# Patient Record
Sex: FEMALE | Race: WHITE | Employment: OTHER | URBAN - METROPOLITAN AREA
[De-identification: names, ages, dates, MRNs, and addresses within clinical notes are randomized per-mention and may not be internally consistent; named-entity substitution may affect disease eponyms.]

---

## 2017-01-16 ENCOUNTER — TELEPHONE (OUTPATIENT)
Dept: FAMILY MEDICINE CLINIC | Age: 67
End: 2017-01-16

## 2017-01-23 ENCOUNTER — OFFICE VISIT (OUTPATIENT)
Dept: FAMILY MEDICINE CLINIC | Age: 67
End: 2017-01-23

## 2017-01-23 VITALS
WEIGHT: 150.8 LBS | HEART RATE: 80 BPM | DIASTOLIC BLOOD PRESSURE: 80 MMHG | BODY MASS INDEX: 26.29 KG/M2 | TEMPERATURE: 98 F | SYSTOLIC BLOOD PRESSURE: 144 MMHG | RESPIRATION RATE: 14 BRPM

## 2017-01-23 DIAGNOSIS — M25.512 LEFT SHOULDER PAIN, UNSPECIFIED CHRONICITY: Primary | ICD-10-CM

## 2017-01-23 DIAGNOSIS — G56.02 LEFT CARPAL TUNNEL SYNDROME: ICD-10-CM

## 2017-01-23 DIAGNOSIS — Z72.0 TOBACCO ABUSE: ICD-10-CM

## 2017-01-23 DIAGNOSIS — R03.0 ELEVATED BLOOD PRESSURE READING WITHOUT DIAGNOSIS OF HYPERTENSION: ICD-10-CM

## 2017-01-23 DIAGNOSIS — M50.20 PROTRUDED CERVICAL DISC: ICD-10-CM

## 2017-01-23 PROCEDURE — 99213 OFFICE O/P EST LOW 20 MIN: CPT | Performed by: NURSE PRACTITIONER

## 2017-01-23 RX ORDER — CYCLOBENZAPRINE HCL 5 MG
5 TABLET ORAL NIGHTLY PRN
Qty: 10 TABLET | Refills: 0 | Status: SHIPPED | OUTPATIENT
Start: 2017-01-23 | End: 2017-02-02

## 2017-01-23 ASSESSMENT — ENCOUNTER SYMPTOMS
DIARRHEA: 0
ABDOMINAL DISTENTION: 0
CONSTIPATION: 0
NAUSEA: 0
VOMITING: 0
APNEA: 0
TROUBLE SWALLOWING: 0
PHOTOPHOBIA: 0
BLOOD IN STOOL: 0
ABDOMINAL PAIN: 0
VOICE CHANGE: 0
SHORTNESS OF BREATH: 0
BACK PAIN: 0
ANAL BLEEDING: 0
COUGH: 0
WHEEZING: 0

## 2017-05-02 LAB
ALBUMIN SERPL-MCNC: NORMAL G/DL
ALP BLD-CCNC: NORMAL U/L
ALT SERPL-CCNC: NORMAL U/L
AST SERPL-CCNC: NORMAL U/L
BILIRUB SERPL-MCNC: NORMAL MG/DL (ref 0.1–1.4)
BUN BLDV-MCNC: NORMAL MG/DL
CALCIUM SERPL-MCNC: NORMAL MG/DL
CHLORIDE BLD-SCNC: NORMAL MMOL/L
CO2: NORMAL MMOL/L
CREAT SERPL-MCNC: NORMAL MG/DL
GFR CALCULATED: NORMAL
GLUCOSE BLD-MCNC: NORMAL MG/DL
POTASSIUM SERPL-SCNC: NORMAL MMOL/L
SODIUM BLD-SCNC: NORMAL MMOL/L
TOTAL PROTEIN: NORMAL

## 2017-05-04 ENCOUNTER — OFFICE VISIT (OUTPATIENT)
Dept: FAMILY MEDICINE CLINIC | Age: 67
End: 2017-05-04

## 2017-05-04 VITALS
TEMPERATURE: 98.6 F | SYSTOLIC BLOOD PRESSURE: 138 MMHG | BODY MASS INDEX: 25.68 KG/M2 | RESPIRATION RATE: 18 BRPM | HEART RATE: 92 BPM | DIASTOLIC BLOOD PRESSURE: 84 MMHG | WEIGHT: 150.4 LBS | HEIGHT: 64 IN

## 2017-05-04 DIAGNOSIS — E78.5 HYPERLIPIDEMIA, UNSPECIFIED HYPERLIPIDEMIA TYPE: ICD-10-CM

## 2017-05-04 DIAGNOSIS — Z72.0 TOBACCO ABUSE: ICD-10-CM

## 2017-05-04 DIAGNOSIS — G25.81 RESTLESS LEG SYNDROME: ICD-10-CM

## 2017-05-04 DIAGNOSIS — M75.122 COMPLETE ROTATOR CUFF TEAR OF LEFT SHOULDER: Primary | ICD-10-CM

## 2017-05-04 PROCEDURE — 99214 OFFICE O/P EST MOD 30 MIN: CPT | Performed by: FAMILY MEDICINE

## 2017-05-04 ASSESSMENT — ENCOUNTER SYMPTOMS
CONSTIPATION: 0
VOMITING: 0
BLOOD IN STOOL: 0
DIARRHEA: 0
SHORTNESS OF BREATH: 0
NAUSEA: 0
BLURRED VISION: 0
ORTHOPNEA: 0

## 2017-07-13 ENCOUNTER — OFFICE VISIT (OUTPATIENT)
Dept: FAMILY MEDICINE CLINIC | Age: 67
End: 2017-07-13

## 2017-07-13 VITALS
BODY MASS INDEX: 26.33 KG/M2 | RESPIRATION RATE: 16 BRPM | DIASTOLIC BLOOD PRESSURE: 74 MMHG | HEART RATE: 88 BPM | SYSTOLIC BLOOD PRESSURE: 120 MMHG | TEMPERATURE: 98.6 F | WEIGHT: 151 LBS

## 2017-07-13 DIAGNOSIS — I83.93 VARICOSE VEINS OF BOTH LOWER EXTREMITIES: ICD-10-CM

## 2017-07-13 DIAGNOSIS — F41.9 ANXIETY: ICD-10-CM

## 2017-07-13 DIAGNOSIS — R91.1 LUNG NODULE: ICD-10-CM

## 2017-07-13 DIAGNOSIS — R00.2 HEART PALPITATIONS: ICD-10-CM

## 2017-07-13 DIAGNOSIS — Z12.39 BREAST CANCER SCREENING: ICD-10-CM

## 2017-07-13 DIAGNOSIS — G25.81 RESTLESS LEG SYNDROME: ICD-10-CM

## 2017-07-13 DIAGNOSIS — Z78.0 POSTMENOPAUSAL: ICD-10-CM

## 2017-07-13 DIAGNOSIS — Z72.0 TOBACCO ABUSE: ICD-10-CM

## 2017-07-13 DIAGNOSIS — M85.80 OSTEOPENIA: ICD-10-CM

## 2017-07-13 DIAGNOSIS — R20.2 PARESTHESIA OF BILATERAL LEGS: ICD-10-CM

## 2017-07-13 DIAGNOSIS — E78.2 MIXED HYPERLIPIDEMIA: Primary | ICD-10-CM

## 2017-07-13 DIAGNOSIS — M19.91 PRIMARY OSTEOARTHRITIS, UNSPECIFIED SITE: ICD-10-CM

## 2017-07-13 PROCEDURE — 99214 OFFICE O/P EST MOD 30 MIN: CPT | Performed by: NURSE PRACTITIONER

## 2017-07-13 RX ORDER — EZETIMIBE 10 MG/1
10 TABLET ORAL DAILY
Qty: 30 TABLET | Refills: 0 | Status: SHIPPED | OUTPATIENT
Start: 2017-07-13 | End: 2017-07-14 | Stop reason: SDUPTHER

## 2017-07-13 RX ORDER — BUSPIRONE HYDROCHLORIDE 7.5 MG/1
7.5 TABLET ORAL 2 TIMES DAILY
Qty: 60 TABLET | Refills: 0 | Status: SHIPPED | OUTPATIENT
Start: 2017-07-13 | End: 2017-08-12

## 2017-07-13 ASSESSMENT — ENCOUNTER SYMPTOMS
TROUBLE SWALLOWING: 0
DIARRHEA: 0
SHORTNESS OF BREATH: 0
NAUSEA: 0
ABDOMINAL DISTENTION: 0
ABDOMINAL PAIN: 0
BLOOD IN STOOL: 0
VOICE CHANGE: 0
VOMITING: 0
APNEA: 0
ANAL BLEEDING: 0
CONSTIPATION: 0
WHEEZING: 0
PHOTOPHOBIA: 0
COUGH: 0
BACK PAIN: 0

## 2017-07-14 ENCOUNTER — TELEPHONE (OUTPATIENT)
Dept: FAMILY MEDICINE CLINIC | Age: 67
End: 2017-07-14

## 2017-07-14 DIAGNOSIS — E78.2 MIXED HYPERLIPIDEMIA: Primary | ICD-10-CM

## 2017-07-14 RX ORDER — EZETIMIBE 10 MG/1
10 TABLET ORAL DAILY
Qty: 90 TABLET | Refills: 0 | Status: SHIPPED | OUTPATIENT
Start: 2017-07-14 | End: 2017-07-18 | Stop reason: SDUPTHER

## 2017-07-18 ENCOUNTER — TELEPHONE (OUTPATIENT)
Dept: FAMILY MEDICINE CLINIC | Age: 67
End: 2017-07-18

## 2017-07-18 DIAGNOSIS — E78.2 MIXED HYPERLIPIDEMIA: ICD-10-CM

## 2017-07-18 RX ORDER — EZETIMIBE 10 MG/1
10 TABLET ORAL DAILY
Qty: 90 TABLET | Refills: 0 | Status: SHIPPED | OUTPATIENT
Start: 2017-07-18 | End: 2017-11-11 | Stop reason: SDUPTHER

## 2017-07-19 ENCOUNTER — HOSPITAL ENCOUNTER (OUTPATIENT)
Dept: NON INVASIVE DIAGNOSTICS | Age: 67
Discharge: HOME | End: 2017-07-19
Payer: MEDICARE

## 2017-07-19 ENCOUNTER — HOSPITAL ENCOUNTER (OUTPATIENT)
Dept: CT IMAGING | Age: 67
Discharge: HOME | End: 2017-07-19
Payer: MEDICARE

## 2017-07-19 DIAGNOSIS — R91.1 LUNG NODULE: ICD-10-CM

## 2017-07-19 DIAGNOSIS — Z72.0 TOBACCO ABUSE: ICD-10-CM

## 2017-07-19 DIAGNOSIS — R00.2 HEART PALPITATIONS: ICD-10-CM

## 2017-07-19 PROCEDURE — 93226 XTRNL ECG REC<48 HR SCAN A/R: CPT

## 2017-07-19 PROCEDURE — 71250 CT THORAX DX C-: CPT

## 2017-07-19 PROCEDURE — 93225 XTRNL ECG REC<48 HRS REC: CPT

## 2017-07-20 ENCOUNTER — HOSPITAL ENCOUNTER (OUTPATIENT)
Dept: OCCUPATIONAL THERAPY | Age: 67
Setting detail: THERAPIES SERIES
Discharge: HOME | End: 2017-07-20
Payer: MEDICARE

## 2017-07-20 PROCEDURE — 97110 THERAPEUTIC EXERCISES: CPT

## 2017-07-20 ASSESSMENT — PAIN DESCRIPTION - ORIENTATION: ORIENTATION: LEFT

## 2017-07-20 ASSESSMENT — PAIN SCALES - GENERAL: PAINLEVEL_OUTOF10: 2

## 2017-07-20 ASSESSMENT — PAIN DESCRIPTION - LOCATION: LOCATION: SHOULDER

## 2017-07-21 ENCOUNTER — HOSPITAL ENCOUNTER (OUTPATIENT)
Dept: OCCUPATIONAL THERAPY | Age: 67
Setting detail: THERAPIES SERIES
Discharge: HOME | End: 2017-07-21
Payer: MEDICARE

## 2017-07-21 PROCEDURE — 97110 THERAPEUTIC EXERCISES: CPT

## 2017-07-21 ASSESSMENT — PAIN DESCRIPTION - ORIENTATION: ORIENTATION: LEFT

## 2017-07-21 ASSESSMENT — PAIN DESCRIPTION - LOCATION: LOCATION: SHOULDER

## 2017-07-21 ASSESSMENT — PAIN DESCRIPTION - PAIN TYPE: TYPE: SURGICAL PAIN

## 2017-07-21 ASSESSMENT — PAIN SCALES - GENERAL: PAINLEVEL_OUTOF10: 2

## 2017-07-24 ENCOUNTER — HOSPITAL ENCOUNTER (OUTPATIENT)
Dept: OCCUPATIONAL THERAPY | Age: 67
Setting detail: THERAPIES SERIES
Discharge: HOME | End: 2017-07-24
Payer: MEDICARE

## 2017-07-24 PROCEDURE — 97110 THERAPEUTIC EXERCISES: CPT

## 2017-07-24 ASSESSMENT — PAIN SCALES - GENERAL: PAINLEVEL_OUTOF10: 3

## 2017-08-07 ENCOUNTER — HOSPITAL ENCOUNTER (OUTPATIENT)
Dept: OCCUPATIONAL THERAPY | Age: 67
Setting detail: THERAPIES SERIES
Discharge: HOME | End: 2017-08-07
Payer: MEDICARE

## 2017-08-07 PROCEDURE — 97110 THERAPEUTIC EXERCISES: CPT

## 2017-08-07 ASSESSMENT — PAIN DESCRIPTION - ORIENTATION: ORIENTATION: LEFT

## 2017-08-07 ASSESSMENT — PAIN SCALES - GENERAL: PAINLEVEL_OUTOF10: 2

## 2017-08-07 ASSESSMENT — PAIN DESCRIPTION - PAIN TYPE: TYPE: SURGICAL PAIN

## 2017-08-07 ASSESSMENT — PAIN DESCRIPTION - LOCATION: LOCATION: SHOULDER

## 2017-08-10 ENCOUNTER — HOSPITAL ENCOUNTER (OUTPATIENT)
Dept: OCCUPATIONAL THERAPY | Age: 67
Setting detail: THERAPIES SERIES
Discharge: HOME | End: 2017-08-10
Payer: MEDICARE

## 2017-08-10 PROCEDURE — 97110 THERAPEUTIC EXERCISES: CPT

## 2017-08-10 ASSESSMENT — PAIN DESCRIPTION - LOCATION: LOCATION: SHOULDER

## 2017-08-10 ASSESSMENT — PAIN DESCRIPTION - ORIENTATION: ORIENTATION: LEFT

## 2017-08-10 ASSESSMENT — PAIN SCALES - GENERAL: PAINLEVEL_OUTOF10: 2

## 2017-08-11 ENCOUNTER — APPOINTMENT (OUTPATIENT)
Dept: OCCUPATIONAL THERAPY | Age: 67
End: 2017-08-11
Payer: MEDICARE

## 2017-08-15 ENCOUNTER — APPOINTMENT (OUTPATIENT)
Dept: OCCUPATIONAL THERAPY | Age: 67
End: 2017-08-15
Payer: MEDICARE

## 2017-08-18 ENCOUNTER — HOSPITAL ENCOUNTER (OUTPATIENT)
Dept: OCCUPATIONAL THERAPY | Age: 67
Setting detail: THERAPIES SERIES
Discharge: HOME | End: 2017-08-18
Payer: MEDICARE

## 2017-08-18 PROCEDURE — 97110 THERAPEUTIC EXERCISES: CPT

## 2017-08-18 ASSESSMENT — PAIN DESCRIPTION - PAIN TYPE: TYPE: SURGICAL PAIN

## 2017-08-18 ASSESSMENT — PAIN DESCRIPTION - ORIENTATION: ORIENTATION: LEFT

## 2017-08-18 ASSESSMENT — PAIN DESCRIPTION - LOCATION: LOCATION: SHOULDER

## 2017-08-18 ASSESSMENT — PAIN SCALES - GENERAL: PAINLEVEL_OUTOF10: 2

## 2017-08-22 ENCOUNTER — APPOINTMENT (OUTPATIENT)
Dept: OCCUPATIONAL THERAPY | Age: 67
End: 2017-08-22
Payer: MEDICARE

## 2017-08-25 ENCOUNTER — APPOINTMENT (OUTPATIENT)
Dept: OCCUPATIONAL THERAPY | Age: 67
End: 2017-08-25
Payer: MEDICARE

## 2017-08-28 ENCOUNTER — HOSPITAL ENCOUNTER (OUTPATIENT)
Dept: OCCUPATIONAL THERAPY | Age: 67
Setting detail: THERAPIES SERIES
Discharge: HOME | End: 2017-08-28
Payer: MEDICARE

## 2017-08-28 PROCEDURE — 97110 THERAPEUTIC EXERCISES: CPT

## 2017-08-30 ENCOUNTER — HOSPITAL ENCOUNTER (OUTPATIENT)
Dept: OCCUPATIONAL THERAPY | Age: 67
Setting detail: THERAPIES SERIES
Discharge: HOME | End: 2017-08-30
Payer: MEDICARE

## 2017-08-30 PROCEDURE — 97110 THERAPEUTIC EXERCISES: CPT

## 2017-09-05 ENCOUNTER — HOSPITAL ENCOUNTER (OUTPATIENT)
Dept: OCCUPATIONAL THERAPY | Age: 67
Setting detail: THERAPIES SERIES
Discharge: HOME | End: 2017-09-05
Payer: MEDICARE

## 2017-09-05 PROCEDURE — 97110 THERAPEUTIC EXERCISES: CPT

## 2017-09-12 ENCOUNTER — HOSPITAL ENCOUNTER (OUTPATIENT)
Dept: OCCUPATIONAL THERAPY | Age: 67
Setting detail: THERAPIES SERIES
Discharge: HOME | End: 2017-09-12
Payer: MEDICARE

## 2017-09-12 PROCEDURE — 97110 THERAPEUTIC EXERCISES: CPT

## 2017-09-15 ENCOUNTER — HOSPITAL ENCOUNTER (OUTPATIENT)
Dept: OCCUPATIONAL THERAPY | Age: 67
Setting detail: THERAPIES SERIES
Discharge: HOME | End: 2017-09-15
Payer: MEDICARE

## 2017-09-15 PROCEDURE — 97110 THERAPEUTIC EXERCISES: CPT

## 2017-09-15 PROCEDURE — G8985 CARRY GOAL STATUS: HCPCS

## 2017-09-15 PROCEDURE — G8984 CARRY CURRENT STATUS: HCPCS

## 2017-09-18 ENCOUNTER — HOSPITAL ENCOUNTER (OUTPATIENT)
Dept: OCCUPATIONAL THERAPY | Age: 67
Setting detail: THERAPIES SERIES
Discharge: HOME | End: 2017-09-18
Payer: MEDICARE

## 2017-09-18 PROCEDURE — 97110 THERAPEUTIC EXERCISES: CPT

## 2017-09-21 ENCOUNTER — HOSPITAL ENCOUNTER (EMERGENCY)
Age: 67
Discharge: HOME | End: 2017-09-21
Attending: INTERNAL MEDICINE
Payer: MEDICARE

## 2017-09-21 ENCOUNTER — APPOINTMENT (OUTPATIENT)
Dept: GENERAL RADIOLOGY | Age: 67
End: 2017-09-21
Payer: MEDICARE

## 2017-09-21 ENCOUNTER — APPOINTMENT (OUTPATIENT)
Dept: CT IMAGING | Age: 67
End: 2017-09-21
Payer: MEDICARE

## 2017-09-21 VITALS
HEART RATE: 91 BPM | RESPIRATION RATE: 16 BRPM | BODY MASS INDEX: 25.81 KG/M2 | TEMPERATURE: 98.6 F | OXYGEN SATURATION: 94 % | WEIGHT: 148 LBS | SYSTOLIC BLOOD PRESSURE: 148 MMHG | DIASTOLIC BLOOD PRESSURE: 78 MMHG

## 2017-09-21 DIAGNOSIS — N20.0 RENAL CALCULUS: Primary | ICD-10-CM

## 2017-09-21 LAB
ALBUMIN SERPL-MCNC: 4.3 G/DL (ref 3.5–5.1)
ALP BLD-CCNC: 109 U/L (ref 38–126)
ALT SERPL-CCNC: 26 U/L (ref 11–66)
AMYLASE: 80 U/L (ref 20–104)
ANION GAP SERPL CALCULATED.3IONS-SCNC: 10 MEQ/L (ref 8–16)
ANISOCYTOSIS: ABNORMAL
AST SERPL-CCNC: 19 U/L (ref 5–40)
BACTERIA: ABNORMAL /HPF
BASOPHILS # BLD: 0.2 %
BASOPHILS ABSOLUTE: 0 THOU/MM3 (ref 0–0.1)
BILIRUB SERPL-MCNC: 0.3 MG/DL (ref 0.3–1.2)
BILIRUBIN DIRECT: < 0.2 MG/DL (ref 0–0.3)
BILIRUBIN URINE: NEGATIVE
BLOOD, URINE: ABNORMAL
BUN BLDV-MCNC: 18 MG/DL (ref 7–22)
CALCIUM SERPL-MCNC: 9.6 MG/DL (ref 8.5–10.5)
CASTS 2: ABNORMAL /LPF
CASTS UA: ABNORMAL /LPF
CHARACTER, URINE: ABNORMAL
CHLORIDE BLD-SCNC: 102 MEQ/L (ref 98–111)
CO2: 27 MEQ/L (ref 23–33)
COLOR: ABNORMAL
CREAT SERPL-MCNC: 1.1 MG/DL (ref 0.4–1.2)
CRYSTALS, UA: ABNORMAL
EOSINOPHIL # BLD: 0.8 %
EOSINOPHILS ABSOLUTE: 0.1 THOU/MM3 (ref 0–0.4)
EPITHELIAL CELLS, UA: ABNORMAL /HPF
GFR SERPL CREATININE-BSD FRML MDRD: 50 ML/MIN/1.73M2
GLUCOSE BLD-MCNC: 117 MG/DL (ref 70–108)
GLUCOSE URINE: NEGATIVE MG/DL
HCT VFR BLD CALC: 44.8 % (ref 37–47)
HEMOGLOBIN: 15.2 GM/DL (ref 12–16)
KETONES, URINE: NEGATIVE
LEUKOCYTE ESTERASE, URINE: NEGATIVE
LIPASE: 36.5 U/L (ref 5.6–51.3)
LYMPHOCYTES # BLD: 13.7 %
LYMPHOCYTES ABSOLUTE: 1.6 THOU/MM3 (ref 1–4.8)
MCH RBC QN AUTO: 31.7 PG (ref 27–31)
MCHC RBC AUTO-ENTMCNC: 33.8 GM/DL (ref 33–37)
MCV RBC AUTO: 93.8 FL (ref 81–99)
MISCELLANEOUS 2: ABNORMAL
MONOCYTES # BLD: 6.2 %
MONOCYTES ABSOLUTE: 0.7 THOU/MM3 (ref 0.4–1.3)
NITRITE, URINE: NEGATIVE
NUCLEATED RED BLOOD CELLS: 0 /100 WBC
OSMOLALITY CALCULATION: 280.5 MOSMOL/KG (ref 275–300)
PDW BLD-RTO: 14.8 % (ref 11.5–14.5)
PH UA: 5.5
PLATELET # BLD: 215 THOU/MM3 (ref 130–400)
PMV BLD AUTO: 8 MCM (ref 7.4–10.4)
POTASSIUM SERPL-SCNC: 5.2 MEQ/L (ref 3.5–5.2)
PROTEIN UA: 30
RBC # BLD: 4.78 MILL/MM3 (ref 4.2–5.4)
RBC # BLD: NORMAL 10*6/UL
RBC URINE: > 200 /HPF
RENAL EPITHELIAL, UA: ABNORMAL
SEG NEUTROPHILS: 79.1 %
SEGMENTED NEUTROPHILS ABSOLUTE COUNT: 9.3 THOU/MM3 (ref 1.8–7.7)
SODIUM BLD-SCNC: 139 MEQ/L (ref 135–145)
SPECIFIC GRAVITY, URINE: 1.03 (ref 1–1.03)
TOTAL PROTEIN: 6.4 G/DL (ref 6.1–8)
TROPONIN T: < 0.01 NG/ML
UROBILINOGEN, URINE: 0.2 EU/DL
WBC # BLD: 11.8 THOU/MM3 (ref 4.8–10.8)
WBC UA: ABNORMAL /HPF
YEAST: ABNORMAL

## 2017-09-21 PROCEDURE — 80053 COMPREHEN METABOLIC PANEL: CPT

## 2017-09-21 PROCEDURE — 87086 URINE CULTURE/COLONY COUNT: CPT

## 2017-09-21 PROCEDURE — 83690 ASSAY OF LIPASE: CPT

## 2017-09-21 PROCEDURE — 99284 EMERGENCY DEPT VISIT MOD MDM: CPT

## 2017-09-21 PROCEDURE — 85025 COMPLETE CBC W/AUTO DIFF WBC: CPT

## 2017-09-21 PROCEDURE — 96375 TX/PRO/DX INJ NEW DRUG ADDON: CPT

## 2017-09-21 PROCEDURE — 81001 URINALYSIS AUTO W/SCOPE: CPT

## 2017-09-21 PROCEDURE — 82150 ASSAY OF AMYLASE: CPT

## 2017-09-21 PROCEDURE — 74176 CT ABD & PELVIS W/O CONTRAST: CPT

## 2017-09-21 PROCEDURE — 96376 TX/PRO/DX INJ SAME DRUG ADON: CPT

## 2017-09-21 PROCEDURE — 93005 ELECTROCARDIOGRAM TRACING: CPT | Performed by: INTERNAL MEDICINE

## 2017-09-21 PROCEDURE — 36415 COLL VENOUS BLD VENIPUNCTURE: CPT

## 2017-09-21 PROCEDURE — 2580000003 HC RX 258: Performed by: INTERNAL MEDICINE

## 2017-09-21 PROCEDURE — 96361 HYDRATE IV INFUSION ADD-ON: CPT

## 2017-09-21 PROCEDURE — 82248 BILIRUBIN DIRECT: CPT

## 2017-09-21 PROCEDURE — 6360000002 HC RX W HCPCS: Performed by: INTERNAL MEDICINE

## 2017-09-21 PROCEDURE — 96374 THER/PROPH/DIAG INJ IV PUSH: CPT

## 2017-09-21 PROCEDURE — 84484 ASSAY OF TROPONIN QUANT: CPT

## 2017-09-21 RX ORDER — ONDANSETRON 4 MG/1
4 TABLET, ORALLY DISINTEGRATING ORAL EVERY 8 HOURS PRN
Qty: 20 TABLET | Refills: 0 | Status: SHIPPED | OUTPATIENT
Start: 2017-09-21 | End: 2017-12-21 | Stop reason: ALTCHOICE

## 2017-09-21 RX ORDER — OXYCODONE HYDROCHLORIDE AND ACETAMINOPHEN 5; 325 MG/1; MG/1
1-2 TABLET ORAL EVERY 6 HOURS PRN
Qty: 10 TABLET | Refills: 0 | Status: SHIPPED | OUTPATIENT
Start: 2017-09-21 | End: 2017-09-22

## 2017-09-21 RX ORDER — LEVOFLOXACIN 500 MG/1
500 TABLET, FILM COATED ORAL DAILY
Qty: 7 TABLET | Refills: 0 | Status: SHIPPED | OUTPATIENT
Start: 2017-09-21 | End: 2017-09-22

## 2017-09-21 RX ORDER — MORPHINE SULFATE 2 MG/ML
2 INJECTION, SOLUTION INTRAMUSCULAR; INTRAVENOUS ONCE
Status: COMPLETED | OUTPATIENT
Start: 2017-09-21 | End: 2017-09-21

## 2017-09-21 RX ORDER — 0.9 % SODIUM CHLORIDE 0.9 %
1000 INTRAVENOUS SOLUTION INTRAVENOUS ONCE
Status: COMPLETED | OUTPATIENT
Start: 2017-09-21 | End: 2017-09-21

## 2017-09-21 RX ORDER — FUROSEMIDE 10 MG/ML
40 INJECTION INTRAMUSCULAR; INTRAVENOUS ONCE
Status: DISCONTINUED | OUTPATIENT
Start: 2017-09-21 | End: 2017-09-21

## 2017-09-21 RX ORDER — ONDANSETRON 2 MG/ML
4 INJECTION INTRAMUSCULAR; INTRAVENOUS ONCE
Status: COMPLETED | OUTPATIENT
Start: 2017-09-21 | End: 2017-09-21

## 2017-09-21 RX ORDER — MORPHINE SULFATE 4 MG/ML
4 INJECTION, SOLUTION INTRAMUSCULAR; INTRAVENOUS ONCE
Status: COMPLETED | OUTPATIENT
Start: 2017-09-21 | End: 2017-09-21

## 2017-09-21 RX ADMIN — MORPHINE SULFATE 2 MG: 2 INJECTION, SOLUTION INTRAMUSCULAR; INTRAVENOUS at 20:25

## 2017-09-21 RX ADMIN — SODIUM CHLORIDE 1000 ML: 9 INJECTION, SOLUTION INTRAVENOUS at 20:17

## 2017-09-21 RX ADMIN — MORPHINE SULFATE 4 MG: 4 INJECTION, SOLUTION INTRAMUSCULAR; INTRAVENOUS at 21:42

## 2017-09-21 RX ADMIN — ONDANSETRON 4 MG: 2 INJECTION INTRAMUSCULAR; INTRAVENOUS at 20:25

## 2017-09-21 ASSESSMENT — PAIN DESCRIPTION - FREQUENCY: FREQUENCY: CONTINUOUS

## 2017-09-21 ASSESSMENT — PAIN DESCRIPTION - PROGRESSION: CLINICAL_PROGRESSION: NOT CHANGED

## 2017-09-21 ASSESSMENT — PAIN DESCRIPTION - PAIN TYPE
TYPE: ACUTE PAIN
TYPE: ACUTE PAIN

## 2017-09-21 ASSESSMENT — PAIN DESCRIPTION - ORIENTATION
ORIENTATION: RIGHT
ORIENTATION: RIGHT;LOWER

## 2017-09-21 ASSESSMENT — ENCOUNTER SYMPTOMS
ABDOMINAL PAIN: 1
EYE DISCHARGE: 0
VOMITING: 1
NAUSEA: 1
SORE THROAT: 0
DIARRHEA: 0
COUGH: 0
RHINORRHEA: 0
EYE PAIN: 0
SHORTNESS OF BREATH: 0
BACK PAIN: 0
WHEEZING: 0

## 2017-09-21 ASSESSMENT — PAIN DESCRIPTION - LOCATION
LOCATION: FLANK
LOCATION: BACK

## 2017-09-21 ASSESSMENT — PAIN SCALES - GENERAL
PAINLEVEL_OUTOF10: 6
PAINLEVEL_OUTOF10: 9

## 2017-09-21 ASSESSMENT — PAIN DESCRIPTION - ONSET: ONSET: GRADUAL

## 2017-09-21 ASSESSMENT — PAIN DESCRIPTION - DESCRIPTORS: DESCRIPTORS: SHARP

## 2017-09-22 ENCOUNTER — OFFICE VISIT (OUTPATIENT)
Dept: UROLOGY | Age: 67
End: 2017-09-22
Payer: MEDICARE

## 2017-09-22 ENCOUNTER — APPOINTMENT (OUTPATIENT)
Dept: OCCUPATIONAL THERAPY | Age: 67
End: 2017-09-22
Payer: MEDICARE

## 2017-09-22 VITALS
DIASTOLIC BLOOD PRESSURE: 72 MMHG | BODY MASS INDEX: 24.66 KG/M2 | SYSTOLIC BLOOD PRESSURE: 128 MMHG | HEIGHT: 65 IN | WEIGHT: 148 LBS

## 2017-09-22 DIAGNOSIS — N20.0 KIDNEY STONE: Primary | ICD-10-CM

## 2017-09-22 LAB
EKG ATRIAL RATE: 74 BPM
EKG P AXIS: 71 DEGREES
EKG P-R INTERVAL: 150 MS
EKG Q-T INTERVAL: 382 MS
EKG QRS DURATION: 82 MS
EKG QTC CALCULATION (BAZETT): 424 MS
EKG R AXIS: 74 DEGREES
EKG T AXIS: 89 DEGREES
EKG VENTRICULAR RATE: 74 BPM

## 2017-09-22 PROCEDURE — 99203 OFFICE O/P NEW LOW 30 MIN: CPT | Performed by: NURSE PRACTITIONER

## 2017-09-22 RX ORDER — TRAMADOL HYDROCHLORIDE 50 MG/1
50 TABLET ORAL EVERY 6 HOURS PRN
Qty: 20 TABLET | Refills: 0 | Status: SHIPPED | OUTPATIENT
Start: 2017-09-22 | End: 2017-10-02

## 2017-09-22 RX ORDER — TAMSULOSIN HYDROCHLORIDE 0.4 MG/1
0.4 CAPSULE ORAL DAILY
Qty: 14 CAPSULE | Refills: 0 | Status: SHIPPED | OUTPATIENT
Start: 2017-09-22 | End: 2017-12-21 | Stop reason: ALTCHOICE

## 2017-09-23 LAB
ORGANISM: ABNORMAL
URINE CULTURE, ROUTINE: ABNORMAL

## 2017-09-25 ENCOUNTER — TELEPHONE (OUTPATIENT)
Dept: UROLOGY | Age: 67
End: 2017-09-25

## 2017-09-25 NOTE — TELEPHONE ENCOUNTER
PT SCHD TO SEE ZACK ON 10/12/17 FOR KIDNEY STONE. PT WAS INSTRUCTED TO GET KUB BEFORE APPT. PT WAS IN ER OVER THE WEEKEND AND HAVING PAIN.   WANTING TO MOVE APPT UP BUT HASNT HAD KUB YET?  PLEASE ADVISE

## 2017-09-26 ENCOUNTER — TELEPHONE (OUTPATIENT)
Dept: FAMILY MEDICINE CLINIC | Age: 67
End: 2017-09-26

## 2017-09-26 ENCOUNTER — APPOINTMENT (OUTPATIENT)
Dept: OCCUPATIONAL THERAPY | Age: 67
End: 2017-09-26
Payer: MEDICARE

## 2017-09-27 ENCOUNTER — TELEPHONE (OUTPATIENT)
Dept: UROLOGY | Age: 67
End: 2017-09-27

## 2017-09-27 LAB
CHOLESTEROL/HDL RATIO: 3
CHOLESTEROL: 193 MG/DL
FOLATE: 17.46 NG/ML
HDLC SERPL-MCNC: 64 MG/DL (ref 40–60)
LDL CHOLESTEROL CALCULATED: 102 MG/DL
LDL/HDL RATIO: 1.6
TRIGL SERPL-MCNC: 133 MG/DL
VITAMIN B-12: 264 PG/ML (ref 211–911)
VLDLC SERPL CALC-MCNC: 27 MG/DL

## 2017-09-28 DIAGNOSIS — N20.0 KIDNEY STONE: Primary | ICD-10-CM

## 2017-09-29 ENCOUNTER — APPOINTMENT (OUTPATIENT)
Dept: OCCUPATIONAL THERAPY | Age: 67
End: 2017-09-29
Payer: MEDICARE

## 2017-09-29 LAB — SURGICAL PATHOLOGY REPORT: NORMAL

## 2017-10-03 ENCOUNTER — HOSPITAL ENCOUNTER (OUTPATIENT)
Dept: OCCUPATIONAL THERAPY | Age: 67
Setting detail: THERAPIES SERIES
Discharge: HOME | End: 2017-10-03
Payer: MEDICARE

## 2017-10-03 LAB
COMPONENT: NORMAL
STONE WEIGHT: 0 G

## 2017-10-03 PROCEDURE — G8985 CARRY GOAL STATUS: HCPCS

## 2017-10-03 PROCEDURE — G8986 CARRY D/C STATUS: HCPCS

## 2017-10-03 PROCEDURE — 97110 THERAPEUTIC EXERCISES: CPT

## 2017-10-03 NOTE — PROGRESS NOTES
[facility]  Discharge Note  [address]    Time In: 1300  Time Out: 0061  Minutes: 39  Timed Code Treatment Minutes: 39 Minutes     Date: 10/3/2017  Patient Name: Familia Zelaya        CSN: [id number]   : 1950  (77 y.o.)  Gender: female   Referring Practitioner: Dr. Juani Purvis, Michael Recinos.  Diagnosis: Partial tear of left rotator cuff, biceps tendinitis of left shoulder, atrophy of muscle of left shoulder          General:  OT Visit Information  Onset Date: 17  OT Insurance Information: An[employer] Medicare - subject to Medicare caps  Total # of Visits to Date: 25  Certification Period Expiration Date: 10/27/17  Comments: back to MD on 10/26/17       Restrictions/Precautions:       Position Activity Restriction  Other position/activity restrictions: per Dr. Juani Purvis protocol for RCR/1/3 acromioplasty - surgery date 17. Subjective:  Subjective: Patient reports she continues to get slowly better. Pain:  Patient Currently in Pain: No (No pain at rest. )       Objective:     Upper Extremity Function  UE AROM: goals addressed for discharge  UE PROM: pulleys, sleeper stretch, supine PROM L shoulder all planes, slow progressive stretching  UE AAROM: supine dowel flexion  UE Stretching: IR towel stretch, sleeper stretch, corner stretch, wall slides  UE Strengthing: reviewed yellow theraband corey x 12 each                                                Activity Tolerance: Additional Comments: Patient tolerated treatment well. Assessment:  Assessment: Patient has made moderate progress, shoulder remains tight and stiff, but slowly improving and pain is less. Patient could use additional therapy but has met Medicare therapy caps, so she was educated in a stretching program to continue on her own.   She was also educated in some light strengthening exs., but stretching was emphasized due to the tightness of the

## 2017-10-06 ENCOUNTER — APPOINTMENT (OUTPATIENT)
Dept: OCCUPATIONAL THERAPY | Age: 67
End: 2017-10-06
Payer: MEDICARE

## 2017-10-10 ENCOUNTER — APPOINTMENT (OUTPATIENT)
Dept: OCCUPATIONAL THERAPY | Age: 67
End: 2017-10-10
Payer: MEDICARE

## 2017-10-13 ENCOUNTER — HOSPITAL ENCOUNTER (OUTPATIENT)
Dept: OCCUPATIONAL THERAPY | Age: 67
Setting detail: THERAPIES SERIES
Discharge: HOME | End: 2017-10-13
Payer: MEDICARE

## 2017-11-11 DIAGNOSIS — E78.2 MIXED HYPERLIPIDEMIA: ICD-10-CM

## 2017-11-13 RX ORDER — EZETIMIBE 10 MG/1
10 TABLET ORAL DAILY
Qty: 90 TABLET | Refills: 3 | Status: SHIPPED | OUTPATIENT
Start: 2017-11-13 | End: 2018-10-02 | Stop reason: SDUPTHER

## 2017-11-13 NOTE — TELEPHONE ENCOUNTER
This is regarding a medication refill request from [facility] for Zetia 10mg 1 tablet QD  Last written:07/18/2017 90 tablets with 0 refills  Last seen:07/13/2017, No future appointments  Rx verified,ordered and set to escribe

## 2017-12-07 ENCOUNTER — TELEPHONE (OUTPATIENT)
Dept: FAMILY MEDICINE CLINIC | Age: 67
End: 2017-12-07

## 2017-12-20 ENCOUNTER — TELEPHONE (OUTPATIENT)
Dept: FAMILY MEDICINE CLINIC | Age: 67
End: 2017-12-20

## 2017-12-21 ENCOUNTER — OFFICE VISIT (OUTPATIENT)
Dept: FAMILY MEDICINE CLINIC | Age: 67
End: 2017-12-21
Payer: MEDICARE

## 2017-12-21 VITALS
TEMPERATURE: 97.5 F | HEART RATE: 72 BPM | DIASTOLIC BLOOD PRESSURE: 72 MMHG | WEIGHT: 153.4 LBS | SYSTOLIC BLOOD PRESSURE: 128 MMHG | RESPIRATION RATE: 16 BRPM | BODY MASS INDEX: 25.53 KG/M2

## 2017-12-21 DIAGNOSIS — R09.81 SINUS CONGESTION: ICD-10-CM

## 2017-12-21 DIAGNOSIS — R05.9 COUGH: ICD-10-CM

## 2017-12-21 DIAGNOSIS — J01.40 ACUTE NON-RECURRENT PANSINUSITIS: ICD-10-CM

## 2017-12-21 DIAGNOSIS — J20.9 ACUTE BRONCHITIS WITH BRONCHOSPASM: Primary | ICD-10-CM

## 2017-12-21 PROCEDURE — 99213 OFFICE O/P EST LOW 20 MIN: CPT | Performed by: NURSE PRACTITIONER

## 2017-12-21 PROCEDURE — 94640 AIRWAY INHALATION TREATMENT: CPT | Performed by: NURSE PRACTITIONER

## 2017-12-21 RX ORDER — PSEUDOEPHEDRINE HYDROCHLORIDE 30 MG/1
30 TABLET ORAL EVERY 6 HOURS PRN
Qty: 28 TABLET | Refills: 0 | Status: SHIPPED | OUTPATIENT
Start: 2017-12-21 | End: 2017-12-28

## 2017-12-21 RX ORDER — ALBUTEROL SULFATE 90 UG/1
2 AEROSOL, METERED RESPIRATORY (INHALATION) EVERY 6 HOURS PRN
Qty: 1 INHALER | Refills: 0 | Status: SHIPPED | OUTPATIENT
Start: 2017-12-21 | End: 2018-10-23

## 2017-12-21 RX ORDER — BENZONATATE 100 MG/1
100 CAPSULE ORAL 3 TIMES DAILY PRN
Qty: 21 CAPSULE | Refills: 0 | Status: SHIPPED | OUTPATIENT
Start: 2017-12-21 | End: 2017-12-28

## 2017-12-21 RX ORDER — CEFUROXIME AXETIL 250 MG/1
250 TABLET ORAL 2 TIMES DAILY
Qty: 28 TABLET | Refills: 0 | Status: SHIPPED | OUTPATIENT
Start: 2017-12-21 | End: 2018-01-04

## 2017-12-21 RX ORDER — ALBUTEROL SULFATE 2.5 MG/3ML
2.5 SOLUTION RESPIRATORY (INHALATION) ONCE
Status: COMPLETED | OUTPATIENT
Start: 2017-12-21 | End: 2017-12-21

## 2017-12-21 RX ADMIN — ALBUTEROL SULFATE 2.5 MG: 2.5 SOLUTION RESPIRATORY (INHALATION) at 10:26

## 2017-12-21 ASSESSMENT — ENCOUNTER SYMPTOMS
COUGH: 1
SINUS PRESSURE: 1
GASTROINTESTINAL NEGATIVE: 1
CHEST TIGHTNESS: 1
SINUS PAIN: 1
SHORTNESS OF BREATH: 0
WHEEZING: 0
VOICE CHANGE: 1
SORE THROAT: 1

## 2017-12-21 ASSESSMENT — PATIENT HEALTH QUESTIONNAIRE - PHQ9
2. FEELING DOWN, DEPRESSED OR HOPELESS: 0
1. LITTLE INTEREST OR PLEASURE IN DOING THINGS: 0
SUM OF ALL RESPONSES TO PHQ9 QUESTIONS 1 & 2: 0
SUM OF ALL RESPONSES TO PHQ QUESTIONS 1-9: 0

## 2017-12-21 NOTE — PROGRESS NOTES
Jose Luis Banks is a 79 y. o.female is here today for:  Chief Complaint   Patient presents with    Cough     Symptoms for the past week. Productive cough. Sinus congestion, drainage, and right ear pain. No known fevers. Using Tylenol cold and flu with no benefit. The patient is here today with a complaint of sinus congestion, chest congestion a cough and voice hoarseness. The patient has been afebrile. The patient has taken Tylenol Cold and Flu without improvement. Duration of symptoms:  10 days    Review of Systems   Constitutional: Negative for fever. HENT: Positive for congestion, ear pain (Right otalgia), postnasal drip, sinus pain, sinus pressure, sore throat and voice change. Respiratory: Positive for cough and chest tightness. Negative for shortness of breath and wheezing. Gastrointestinal: Negative. Skin: Negative for rash. Allergic/Immunologic: Negative for immunocompromised state. Neurological: Positive for dizziness and light-headedness. Negative for headaches. Hematological: Negative for adenopathy. Does not bruise/bleed easily. OBJECTIVE     /72 (Site: Left Arm, Position: Sitting, Cuff Size: Medium Adult)   Pulse 72   Temp 97.5 °F (36.4 °C) (Oral)   Resp 16   Wt 153 lb 6.4 oz (69.6 kg)   BMI 25.53 kg/m²     Body mass index is 25.53 kg/m². Physical Exam   Constitutional: She is oriented to person, place, and time. She appears well-developed and well-nourished. HENT:   Head: Normocephalic and atraumatic. Right Ear: External ear normal.   Left Ear: External ear normal.   Mouth/Throat: Oropharynx is clear and moist. No oropharyngeal exudate. Tender to percussion over frontal and maxillary sinuses. Posterior pharynx erythematous without exudate. TMs appear normal bilaterally. Nasal turbinates erythematous and edematous. Eyes: Conjunctivae and EOM are normal. Pupils are equal, round, and reactive to light.  Right eye exhibits CNP on 12/21/2017 at 10:56 AM

## 2017-12-21 NOTE — PATIENT INSTRUCTIONS
Orders Placed:  Orders Placed This Encounter   Procedures    [id number] - MD INHAL RX, AIRWAY OBST/DX SPUTUM INDUCT     Medications Prescribed:  Orders Placed This Encounter   Medications    cefUROXime (CEFTIN) 250 MG tablet     Sig: Take 1 tablet by mouth 2 times daily for 14 days     Dispense:  28 tablet     Refill:  0    albuterol sulfate  (90 Base) MCG/ACT inhaler     Sig: Inhale 2 puffs into the lungs every 6 hours as needed for Wheezing or Shortness of Breath     Dispense:  1 Inhaler     Refill:  0    pseudoephedrine (DECONGESTANT) 30 MG tablet     Sig: Take 1 tablet by mouth every 6 hours as needed for Congestion     Dispense:  28 tablet     Refill:  0    albuterol (PROVENTIL) nebulizer solution 2.5 mg    benzonatate (TESSALON PERLES) 100 MG capsule     Sig: Take 1 capsule by mouth 3 times daily as needed for Cough     Dispense:  21 capsule     Refill:  0       Take medications as prescribed. Take a probiotic (such as the store brand that is comparable to Align, Culturelle or Florastor) daily while you are taking the antibiotic. Separate the probiotic and the antibiotic by at least 2 hours. Continue taking the probiotic for 2 weeks after completing the antibiotic. Do NOT take probiotics if you are immunocompromised (i.e., on chemotherapy or taking immunosuppressive drugs following a transplant). Be aware that some probiotics contain gluten. If you are intolerant of gluten, check with the pharmacist before your purchase of probiotics. Push fluids. Avoid acidic foods and beverages. Ibuprofen or Acetaminophen as directed for discomfort or fever. Take Ibuprofen with food. Cool mist humidifier at the bedside. Warm saline gargles every 2 hours as needed for sore throat. Mix 1 teaspoonful of salt with 8 ounces of water to make a saline solution. Simply Saline nasal/sinus irrigations 3-4 times daily. Be sure to get plenty of rest.    Call with any concerns.     Call the fill a bulb syringe with the solution, insert the tip into your nostril, and squeeze gently. Montgomery Maffucci your nose. · Put a hot, wet towel or a warm gel pack on your face 3 or 4 times a day for 5 to 10 minutes each time. · Try a decongestant nasal spray like oxymetazoline (Afrin). Do not use it for more than 3 days in a row. Using it for more than 3 days can make your congestion worse. When should you call for help? Call your doctor now or seek immediate medical care if:  ? · You have new or worse swelling or redness in your face or around your eyes. ? · You have a new or higher fever. ? Watch closely for changes in your health, and be sure to contact your doctor if:  ? · You have new or worse facial pain. ? · The mucus from your nose becomes thicker (like pus) or has new blood in it. ? · You are not getting better as expected. Where can you learn more? Go to https://DIRAmed.iConnectivity. org and sign in to your [employer] account. Enter F899 in the [employer] box to learn more about \"Sinusitis: Care Instructions. \"     If you do not have an account, please click on the \"Sign Up Now\" link. Current as of: May 12, 2017  Content Version: 11.4  © 9669-7147 motionID technologies. Care instructions adapted under license by [facility] ([facility]). If you have questions about a medical condition or this instruction, always ask your healthcare professional. [place] [address] any warranty or liability for your use of this information. Patient Education        Bronchitis: Care Instructions  Your Care Instructions    Bronchitis is inflammation of the bronchial tubes, which carry air to the lungs. The tubes swell and produce mucus, or phlegm. The mucus and inflamed bronchial tubes make you cough. You may have trouble breathing. Most cases of bronchitis are caused by viruses like those that cause colds. Antibiotics usually do not help and they may be harmful.   Bronchitis usually develops rapidly and lasts about 2 to 3 weeks in otherwise healthy people. Follow-up care is a key part of your treatment and safety. Be sure to make and go to all appointments, and call your doctor if you are having problems. It's also a good idea to know your test results and keep a list of the medicines you take. How can you care for yourself at home? · Take all medicines exactly as prescribed. Call your doctor if you think you are having a problem with your medicine. · Get some extra rest.  · Take an over-the-counter pain medicine, such as acetaminophen (Tylenol), ibuprofen (Advil, Motrin), or naproxen (Aleve) to reduce fever and relieve body aches. Read and follow all instructions on the label. · Do not take two or more pain medicines at the same time unless the doctor told you to. Many pain medicines have acetaminophen, which is Tylenol. Too much acetaminophen (Tylenol) can be harmful. · Take an over-the-counter cough medicine that contains dextromethorphan to help quiet a dry, hacking cough so that you can sleep. Avoid cough medicines that have more than one active ingredient. Read and follow all instructions on the label. · Breathe moist air from a humidifier, hot shower, or sink filled with hot water. The heat and moisture will thin mucus so you can cough it out. · Do not smoke. Smoking can make bronchitis worse. If you need help quitting, talk to your doctor about stop-smoking programs and medicines. These can increase your chances of quitting for good. When should you call for help? Call 911 anytime you think you may need emergency care. For example, call if:  ? · You have severe trouble breathing. ?Call your doctor now or seek immediate medical care if:  ? · You have new or worse trouble breathing. ? · You cough up dark brown or bloody mucus (sputum). ? · You have a new or higher fever. ? · You have a new rash. ? Watch closely for changes in your health, and be sure to contact your doctor

## 2018-01-17 ENCOUNTER — OFFICE VISIT (OUTPATIENT)
Dept: FAMILY MEDICINE CLINIC | Age: 68
End: 2018-01-17
Payer: MEDICARE

## 2018-01-17 VITALS
HEIGHT: 65 IN | RESPIRATION RATE: 16 BRPM | DIASTOLIC BLOOD PRESSURE: 80 MMHG | SYSTOLIC BLOOD PRESSURE: 132 MMHG | TEMPERATURE: 98.2 F | HEART RATE: 84 BPM | BODY MASS INDEX: 25.46 KG/M2 | WEIGHT: 152.8 LBS

## 2018-01-17 DIAGNOSIS — H69.81 DYSFUNCTION OF RIGHT EUSTACHIAN TUBE: ICD-10-CM

## 2018-01-17 DIAGNOSIS — F41.9 ANXIETY: Primary | ICD-10-CM

## 2018-01-17 DIAGNOSIS — G47.00 INSOMNIA, UNSPECIFIED TYPE: ICD-10-CM

## 2018-01-17 PROCEDURE — 99213 OFFICE O/P EST LOW 20 MIN: CPT | Performed by: NURSE PRACTITIONER

## 2018-01-17 RX ORDER — HYDROXYZINE HYDROCHLORIDE 25 MG/1
25 TABLET, FILM COATED ORAL EVERY 8 HOURS PRN
Qty: 30 TABLET | Refills: 0 | Status: SHIPPED | OUTPATIENT
Start: 2018-01-17 | End: 2018-01-27

## 2018-01-17 ASSESSMENT — ENCOUNTER SYMPTOMS
TROUBLE SWALLOWING: 0
EYE REDNESS: 0
WHEEZING: 0
DIARRHEA: 0
NAUSEA: 0
SORE THROAT: 0
ABDOMINAL PAIN: 0
VOMITING: 0
SHORTNESS OF BREATH: 0
EYE PAIN: 0
VISUAL CHANGE: 0
CONSTIPATION: 0
COUGH: 0
RHINORRHEA: 0
BACK PAIN: 0
EYE DISCHARGE: 0
CHEST TIGHTNESS: 0
SINUS PRESSURE: 0
EYE ITCHING: 0

## 2018-01-17 NOTE — PROGRESS NOTES
ranges from 0 to 27. Scores of 5, 10, 15, and 20 represent cutpoints for mild, moderate, moderately severe and severe depression, respectively. PHQ-9 Score  Depression Severity  Proposed Treatment Actions    0  4  None-minimal  None    5  9  Mild  Watchful waiting; repeat PHQ-9 at follow-up    10  14  Moderate  Treatment plan, considering counseling, follow-up and/or pharmacotherapy    15  19  Moderately Severe  Active treatment with pharmacotherapy and/or psychotherapy    20  27  Severe  Immediate initiation of pharmacotherapy and, if severe impairment or poor response to therapy, expedited referral to a mental health specialist for psychotherapy and/or collaborative management            Otalgia    There is pain in the right ear. This is a new problem. The current episode started in the past 7 days. The problem has been waxing and waning. There has been no fever. The pain is at a severity of 4/10. The pain is mild. Pertinent negatives include no abdominal pain, coughing, diarrhea, ear discharge, headaches, hearing loss, rhinorrhea, sore throat or vomiting. She has tried nothing for the symptoms. Mental Health Problem   The primary symptoms include dysphoric mood. The current episode started more than 1 month ago. The degree of incapacity that she is experiencing as a consequence of her illness is moderate. Additional symptoms of the illness include insomnia. Additional symptoms of the illness do not include appetite change, unexpected weight change, fatigue, agitation, psychomotor retardation, feelings of worthlessness, attention impairment, euphoric mood, flight of ideas, inflated self-esteem, decreased need for sleep, poor judgment, visual change, headaches or abdominal pain. She does not admit to suicidal ideas. She does not have a plan to commit suicide. She does not contemplate harming herself. She has not already injured self. She does not contemplate injuring another person.  She has not already injured another person. Review of Systems   Constitutional: Negative for activity change, appetite change, chills, fatigue, fever and unexpected weight change. HENT: Positive for ear pain. Negative for congestion, ear discharge, hearing loss, postnasal drip, rhinorrhea, sinus pressure, sore throat and trouble swallowing. Eyes: Negative for pain, discharge, redness and itching. Respiratory: Negative for cough, chest tightness, shortness of breath and wheezing. Cardiovascular: Negative for chest pain, palpitations and leg swelling. Gastrointestinal: Negative for abdominal pain, constipation, diarrhea, nausea and vomiting. Endocrine: Negative. Genitourinary: Negative for dysuria, flank pain, frequency and hematuria. Musculoskeletal: Negative for arthralgias, back pain, joint swelling and myalgias. Skin: Negative. Neurological: Negative for dizziness, light-headedness and headaches. Hematological: Negative. Psychiatric/Behavioral: Positive for dysphoric mood and sleep disturbance. Negative for agitation, behavioral problems, confusion, decreased concentration, self-injury and suicidal ideas. The patient is nervous/anxious and has insomnia. Objective:   Physical Exam   Constitutional: She is oriented to person, place, and time. She appears well-developed and well-nourished. No distress. HENT:   Head: Normocephalic and atraumatic. Right Ear: Hearing, external ear and ear canal normal. Tympanic membrane is erythematous. Left Ear: Hearing, tympanic membrane, external ear and ear canal normal.   Nose: Nose normal.   Mouth/Throat: Uvula is midline, oropharynx is clear and moist and mucous membranes are normal. No oropharyngeal exudate, posterior oropharyngeal edema, posterior oropharyngeal erythema or tonsillar abscesses. Eyes: Conjunctivae and EOM are normal. Pupils are equal, round, and reactive to light. Neck: Normal range of motion. Neck supple. No JVD present.  No tracheal deviation present. No thyromegaly present. Cardiovascular: Normal rate, regular rhythm and normal heart sounds. Exam reveals no gallop and no friction rub. No murmur heard. Pulmonary/Chest: Effort normal and breath sounds normal. No stridor. No respiratory distress. She has no wheezes. She has no rales. She exhibits no tenderness. Lymphadenopathy:     She has no cervical adenopathy. Neurological: She is alert and oriented to person, place, and time. Skin: Skin is warm and dry. Psychiatric: Her behavior is normal. Judgment and thought content normal. Her mood appears anxious. Nursing note and vitals reviewed. Assessment:      1. Anxiety  hydrOXYzine (ATARAX) 25 MG tablet   2. Insomnia, unspecified type  hydrOXYzine (ATARAX) 25 MG tablet   3. Dysfunction of right eustachian tube             Plan:      Discussed sleep hygiene, sleep diary, avoiding activating foods/fluids. Patient states she has tried melatonin in the past without any improvement in sleep habits. Sleep diary. Discussed with patient anxiety/depression treatments-she states they generally do not work. Medications as directed. FU in one month if no improvement. Patient given educational materials - see patient instructions. Discussed use, benefit, and side effects of prescribed medications. All patient questions answered. Pt voiced understanding. Reviewed health maintenance. Patient agreed with treatment plan.  Follow up as directed.

## 2018-01-17 NOTE — PATIENT INSTRUCTIONS
You may receive a survey about your visit with us today. The feedback from our patients helps us identify what is working well and where the service to all patients can be enhanced. Thank you! Patient Education        Insomnia: Care Instructions  Your Care Instructions    Insomnia is the inability to sleep well. It is a common problem for most people at some time. Insomnia may make it hard for you to get to sleep, stay asleep, or sleep as long as you need to. This can make you tired and grouchy during the day. It can also make you forgetful, less effective at work, and unhappy. Insomnia can be caused by conditions such as depression or anxiety. Pain can also affect your ability to sleep. When these problems are solved, the insomnia usually clears up. But sometimes bad sleep habits can cause insomnia. If insomnia is affecting your work or your enjoyment of life, you can take steps to improve your sleep. Follow-up care is a key part of your treatment and safety. Be sure to make and go to all appointments, and call your doctor if you are having problems. It's also a good idea to know your test results and keep a list of the medicines you take. How can you care for yourself at home? What to avoid  · Do not have drinks with caffeine, such as coffee or black tea, for 8 hours before bed. · Do not smoke or use other types of tobacco near bedtime. Nicotine is a stimulant and can keep you awake. · Avoid drinking alcohol late in the evening, because it can cause you to wake in the middle of the night. · Do not eat a big meal close to bedtime. If you are hungry, eat a light snack. · Do not drink a lot of water close to bedtime, because the need to urinate may wake you up during the night. · Do not read or watch TV in bed. Use the bed only for sleeping and sexual activity. What to try  · Go to bed at the same time every night, and wake up at the same time every morning. Do not take naps during the day.   · Keep your bedroom quiet, dark, and cool. · Sleep on a comfortable pillow and mattress. · If watching the clock makes you anxious, turn it facing away from you so you cannot see the time. · If you worry when you lie down, start a worry book. Well before bedtime, write down your worries, and then set the book and your concerns aside. · Try meditation or other relaxation techniques before you go to bed. · If you cannot fall asleep, get up and go to another room until you feel sleepy. Do something relaxing. Repeat your bedtime routine before you go to bed again. · Make your house quiet and calm about an hour before bedtime. Turn down the lights, turn off the TV, log off the computer, and turn down the volume on music. This can help you relax after a busy day. When should you call for help? Watch closely for changes in your health, and be sure to contact your doctor if:  ? · Your efforts to improve your sleep do not work. ? · Your insomnia gets worse. ? · You have been feeling down, depressed, or hopeless or have lost interest in things that you usually enjoy. Where can you learn more? Go to https://Topguest.Warranty Life. org and sign in to your [email or web address] account. Enter P513 in the iDevices box to learn more about \"Insomnia: Care Instructions. \"     If you do not have an account, please click on the \"Sign Up Now\" link. Current as of: March 5, 2017  Content Version: 11.5  © 7637-0425 Insmed. Care instructions adapted under license by [facility] [employer] [facility] ([facility]). If you have questions about a medical condition or this instruction, always ask your healthcare professional. [place] [address] any warranty or liability for your use of this information. Patient Education        Anxiety Disorder: Care Instructions  Your Care Instructions    Anxiety is a normal reaction to stress. Difficult situations can cause you to have symptoms such as sweaty palms and a nervous feeling.   In an anxiety disorder, the symptoms are far more severe. Constant worry, muscle tension, trouble sleeping, nausea and diarrhea, and other symptoms can make normal daily activities difficult or impossible. These symptoms may occur for no reason, and they can affect your work, school, or social life. Medicines, counseling, and self-care can all help. Follow-up care is a key part of your treatment and safety. Be sure to make and go to all appointments, and call your doctor if you are having problems. It's also a good idea to know your test results and keep a list of the medicines you take. How can you care for yourself at home? · Take medicines exactly as directed. Call your doctor if you think you are having a problem with your medicine. · Go to your counseling sessions and follow-up appointments. · Recognize and accept your anxiety. Then, when you are in a situation that makes you anxious, say to yourself, \"This is not an emergency. I feel uncomfortable, but I am not in danger. I can keep going even if I feel anxious. \"  · Be kind to your body:  ¨ Relieve tension with exercise or a massage. ¨ Get enough rest.  ¨ Avoid alcohol, caffeine, nicotine, and illegal drugs. They can increase your anxiety level and cause sleep problems. ¨ Learn and do relaxation techniques. See below for more about these techniques. · Engage your mind. Get out and do something you enjoy. Go to a funny movie, or take a walk or hike. Plan your day. Having too much or too little to do can make you anxious. · Keep a record of your symptoms. Discuss your fears with a good friend or family member, or join a support group for people with similar problems. Talking to others sometimes relieves stress. · Get involved in social groups, or volunteer to help others. Being alone sometimes makes things seem worse than they are. · Get at least 30 minutes of exercise on most days of the week to relieve stress. Walking is a good choice.  You also may ?Keep the numbers for these national suicide hotlines: 5-574-446-TALK (6-246.651.2416) and 8-886-WPQRQMJ (1-513.473.7329). If you or someone you know talks about suicide or feeling hopeless, get help right away. ? Watch closely for changes in your health, and be sure to contact your doctor if:  ? · You have anxiety or fear that affects your life. ? · You have symptoms of anxiety that are new or different from those you had before. Where can you learn more? Go to https://PersonalispeTealeaf.[employer] [email or web address] org and sign in to your [employer] account. Enter P754 in the iPositioning box to learn more about \"Anxiety Disorder: Care Instructions. \"     If you do not have an account, please click on the \"Sign Up Now\" link. Current as of: May 12, 2017  Content Version: 11.5  © 9555-6720 Healthwise, [employer]. Care instructions adapted under license by [facility] ([facility]). If you have questions about a medical condition or this instruction, always ask your healthcare professional. [place] [address] any warranty or liability for your use of this information.

## 2018-05-07 ENCOUNTER — HOSPITAL ENCOUNTER (EMERGENCY)
Age: 68
Discharge: HOME | End: 2018-05-07
Payer: MEDICARE

## 2018-05-07 ENCOUNTER — HOSPITAL ENCOUNTER (EMERGENCY)
Dept: GENERAL RADIOLOGY | Age: 68
Discharge: HOME | End: 2018-05-07
Payer: MEDICARE

## 2018-05-07 VITALS
BODY MASS INDEX: 24.96 KG/M2 | SYSTOLIC BLOOD PRESSURE: 145 MMHG | HEART RATE: 81 BPM | RESPIRATION RATE: 16 BRPM | WEIGHT: 150 LBS | DIASTOLIC BLOOD PRESSURE: 79 MMHG | TEMPERATURE: 97.6 F | OXYGEN SATURATION: 96 %

## 2018-05-07 DIAGNOSIS — S60.031A CONTUSION OF RIGHT MIDDLE FINGER WITHOUT DAMAGE TO NAIL, INITIAL ENCOUNTER: Primary | ICD-10-CM

## 2018-05-07 PROCEDURE — 99213 OFFICE O/P EST LOW 20 MIN: CPT | Performed by: NURSE PRACTITIONER

## 2018-05-07 PROCEDURE — 99213 OFFICE O/P EST LOW 20 MIN: CPT

## 2018-05-07 PROCEDURE — 73130 X-RAY EXAM OF HAND: CPT

## 2018-05-07 ASSESSMENT — PAIN SCALES - GENERAL: PAINLEVEL_OUTOF10: 8

## 2018-05-07 ASSESSMENT — ENCOUNTER SYMPTOMS
CHOKING: 0
VOMITING: 0
ABDOMINAL PAIN: 0
NAUSEA: 0
COLOR CHANGE: 1
SHORTNESS OF BREATH: 0

## 2018-05-07 ASSESSMENT — PAIN DESCRIPTION - LOCATION: LOCATION: FINGER (COMMENT WHICH ONE)

## 2018-05-07 ASSESSMENT — PAIN DESCRIPTION - PAIN TYPE: TYPE: ACUTE PAIN

## 2018-10-02 ENCOUNTER — HOSPITAL ENCOUNTER (OUTPATIENT)
Dept: GENERAL RADIOLOGY | Age: 68
Discharge: HOME | End: 2018-10-02
Payer: MEDICARE

## 2018-10-02 ENCOUNTER — HOSPITAL ENCOUNTER (OUTPATIENT)
Age: 68
Discharge: HOME | End: 2018-10-02
Payer: MEDICARE

## 2018-10-02 ENCOUNTER — OFFICE VISIT (OUTPATIENT)
Dept: FAMILY MEDICINE CLINIC | Age: 68
End: 2018-10-02
Payer: MEDICARE

## 2018-10-02 VITALS
BODY MASS INDEX: 25.06 KG/M2 | RESPIRATION RATE: 16 BRPM | DIASTOLIC BLOOD PRESSURE: 76 MMHG | SYSTOLIC BLOOD PRESSURE: 126 MMHG | TEMPERATURE: 98.2 F | WEIGHT: 150.6 LBS | HEART RATE: 71 BPM

## 2018-10-02 DIAGNOSIS — E78.2 MIXED HYPERLIPIDEMIA: ICD-10-CM

## 2018-10-02 DIAGNOSIS — Z72.0 TOBACCO ABUSE: ICD-10-CM

## 2018-10-02 DIAGNOSIS — J40 BRONCHITIS: Primary | ICD-10-CM

## 2018-10-02 DIAGNOSIS — J40 BRONCHITIS: ICD-10-CM

## 2018-10-02 PROCEDURE — 99213 OFFICE O/P EST LOW 20 MIN: CPT | Performed by: NURSE PRACTITIONER

## 2018-10-02 PROCEDURE — 71046 X-RAY EXAM CHEST 2 VIEWS: CPT

## 2018-10-02 RX ORDER — DIAZEPAM 5 MG/1
5 TABLET ORAL NIGHTLY PRN
Refills: 0 | COMMUNITY
Start: 2018-09-05 | End: 2018-10-23 | Stop reason: SDUPTHER

## 2018-10-02 RX ORDER — AZITHROMYCIN 250 MG/1
250 TABLET, FILM COATED ORAL DAILY
Qty: 1 PACKET | Refills: 0 | Status: SHIPPED | OUTPATIENT
Start: 2018-10-02 | End: 2018-10-12

## 2018-10-02 RX ORDER — CEFDINIR 300 MG/1
300 CAPSULE ORAL 2 TIMES DAILY
Qty: 20 CAPSULE | Refills: 0 | Status: CANCELLED | OUTPATIENT
Start: 2018-10-02 | End: 2018-10-12

## 2018-10-02 RX ORDER — EZETIMIBE 10 MG/1
10 TABLET ORAL DAILY
Qty: 90 TABLET | Refills: 3 | Status: SHIPPED | OUTPATIENT
Start: 2018-10-02 | End: 2021-02-12

## 2018-10-02 ASSESSMENT — ENCOUNTER SYMPTOMS
SHORTNESS OF BREATH: 1
COUGH: 1
HEARTBURN: 0
SINUS PAIN: 0
WHEEZING: 1
HEMOPTYSIS: 0
SINUS PRESSURE: 0
SORE THROAT: 1

## 2018-10-02 ASSESSMENT — PATIENT HEALTH QUESTIONNAIRE - PHQ9
SUM OF ALL RESPONSES TO PHQ QUESTIONS 1-9: 0
1. LITTLE INTEREST OR PLEASURE IN DOING THINGS: 0
2. FEELING DOWN, DEPRESSED OR HOPELESS: 0
SUM OF ALL RESPONSES TO PHQ9 QUESTIONS 1 & 2: 0
SUM OF ALL RESPONSES TO PHQ QUESTIONS 1-9: 0

## 2018-10-03 ENCOUNTER — TELEPHONE (OUTPATIENT)
Dept: FAMILY MEDICINE CLINIC | Age: 68
End: 2018-10-03

## 2018-10-03 NOTE — TELEPHONE ENCOUNTER
----- Message from MARVEL Wall CNP sent at 10/3/2018  8:00 AM EDT -----  Please call pt and let her know the chest x-ray did not show any pneumonia or other abnormalities.   Thanks -WS

## 2018-10-23 ENCOUNTER — OFFICE VISIT (OUTPATIENT)
Dept: FAMILY MEDICINE CLINIC | Age: 68
End: 2018-10-23
Payer: MEDICARE

## 2018-10-23 VITALS
BODY MASS INDEX: 24.93 KG/M2 | WEIGHT: 149.6 LBS | HEART RATE: 84 BPM | HEIGHT: 65 IN | SYSTOLIC BLOOD PRESSURE: 132 MMHG | TEMPERATURE: 97.7 F | DIASTOLIC BLOOD PRESSURE: 68 MMHG | RESPIRATION RATE: 16 BRPM

## 2018-10-23 DIAGNOSIS — F41.9 ANXIETY: Primary | ICD-10-CM

## 2018-10-23 DIAGNOSIS — J40 BRONCHITIS: ICD-10-CM

## 2018-10-23 DIAGNOSIS — H61.21 IMPACTED CERUMEN OF RIGHT EAR: ICD-10-CM

## 2018-10-23 PROCEDURE — 99213 OFFICE O/P EST LOW 20 MIN: CPT | Performed by: NURSE PRACTITIONER

## 2018-10-23 PROCEDURE — 69209 REMOVE IMPACTED EAR WAX UNI: CPT | Performed by: NURSE PRACTITIONER

## 2018-10-23 RX ORDER — BENZONATATE 100 MG/1
100 CAPSULE ORAL 3 TIMES DAILY PRN
Qty: 21 CAPSULE | Refills: 0 | Status: SHIPPED | OUTPATIENT
Start: 2018-10-23 | End: 2018-10-30

## 2018-10-23 RX ORDER — CEFDINIR 300 MG/1
300 CAPSULE ORAL 2 TIMES DAILY
Qty: 20 CAPSULE | Refills: 0 | Status: SHIPPED | OUTPATIENT
Start: 2018-10-23 | End: 2018-11-02

## 2018-10-23 RX ORDER — DIAZEPAM 5 MG/1
5 TABLET ORAL NIGHTLY PRN
Qty: 30 TABLET | Refills: 0 | Status: SHIPPED | OUTPATIENT
Start: 2018-10-23 | End: 2019-05-14 | Stop reason: SDUPTHER

## 2018-10-23 ASSESSMENT — ENCOUNTER SYMPTOMS
BACK PAIN: 0
COUGH: 1
SHORTNESS OF BREATH: 0
RHINORRHEA: 0
SORE THROAT: 0
WHEEZING: 1

## 2018-10-23 NOTE — PROGRESS NOTES
[facility] FAMILY MEDICINE  [address] [id number]  Dept: [phone]  Dept Fax: [phone]: [phone]     Visit Date:  10/23/2018      Patient:  Arabella Villarreal  YOB: 1950    HPI:     Chief Complaint   Patient presents with    Chest Congestion     Patient states that she is still experiencing coughing and chest congestion since she was last seen in the office on 10/2/2018    Medication Refill     Valium       Cough   This is a recurrent problem. The current episode started 1 to 4 weeks ago. The problem has been unchanged. The problem occurs constantly. The cough is productive of purulent sputum. Associated symptoms include wheezing. Pertinent negatives include no chest pain, chills, ear congestion, ear pain, fever, headaches, nasal congestion, postnasal drip, rhinorrhea, sore throat or shortness of breath. Nothing aggravates the symptoms. She has tried rest and a beta-agonist inhaler (z-pack) for the symptoms. The treatment provided mild relief. Her past medical history is significant for COPD. There is no history of asthma, bronchiectasis, bronchitis, environmental allergies or pneumonia. Medications    Current Outpatient Prescriptions:     diazepam (VALIUM) 5 MG tablet, Take 1 tablet by mouth nightly as needed for Anxiety for up to 30 days. ., Disp: 30 tablet, Rfl: 0    benzonatate (TESSALON PERLES) 100 MG capsule, Take 1 capsule by mouth 3 times daily as needed for Cough, Disp: 21 capsule, Rfl: 0    cefdinir (OMNICEF) 300 MG capsule, Take 1 capsule by mouth 2 times daily for 10 days, Disp: 20 capsule, Rfl: 0    ezetimibe (ZETIA) 10 MG tablet, Take 1 tablet by mouth daily, Disp: 90 tablet, Rfl: 3    PROAIR  (90 Base) MCG/ACT inhaler, Inhale 2 puffs into the lungs every 4 hours as needed for Wheezing, Disp: 1 Inhaler, Rfl: 2    Acetaminophen (TYLENOL PO), Take by mouth as needed, Disp: , Rfl:   

## 2018-11-19 ENCOUNTER — OFFICE VISIT (OUTPATIENT)
Dept: FAMILY MEDICINE CLINIC | Age: 68
End: 2018-11-19
Payer: MEDICARE

## 2018-11-19 VITALS
DIASTOLIC BLOOD PRESSURE: 84 MMHG | WEIGHT: 148 LBS | RESPIRATION RATE: 16 BRPM | TEMPERATURE: 97.5 F | BODY MASS INDEX: 24.63 KG/M2 | SYSTOLIC BLOOD PRESSURE: 124 MMHG | HEART RATE: 68 BPM

## 2018-11-19 DIAGNOSIS — J20.9 BRONCHITIS, ACUTE, WITH BRONCHOSPASM: Primary | ICD-10-CM

## 2018-11-19 DIAGNOSIS — J01.90 ACUTE BACTERIAL SINUSITIS: ICD-10-CM

## 2018-11-19 DIAGNOSIS — B96.89 ACUTE BACTERIAL SINUSITIS: ICD-10-CM

## 2018-11-19 PROCEDURE — 99213 OFFICE O/P EST LOW 20 MIN: CPT | Performed by: NURSE PRACTITIONER

## 2018-11-19 RX ORDER — BENZONATATE 100 MG/1
100 CAPSULE ORAL 3 TIMES DAILY PRN
Qty: 30 CAPSULE | Refills: 0 | Status: SHIPPED | OUTPATIENT
Start: 2018-11-19 | End: 2018-11-26

## 2018-11-19 RX ORDER — CEFUROXIME AXETIL 250 MG/1
250 TABLET ORAL 2 TIMES DAILY
Qty: 20 TABLET | Refills: 0 | Status: SHIPPED | OUTPATIENT
Start: 2018-11-19 | End: 2018-11-29

## 2018-11-19 ASSESSMENT — ENCOUNTER SYMPTOMS
COUGH: 1
SINUS COMPLAINT: 1
SORE THROAT: 0
SWOLLEN GLANDS: 0
HOARSE VOICE: 1
SHORTNESS OF BREATH: 0
SINUS PRESSURE: 1

## 2018-11-19 NOTE — PROGRESS NOTES
normal.   Vitals reviewed. Assessment/Plan:      Luann Faria was seen today for congestion. Diagnoses and all orders for this visit:    Bronchitis, acute, with bronchospasm  -     cefUROXime (CEFTIN) 250 MG tablet; Take 1 tablet by mouth 2 times daily for 10 days  -     benzonatate (TESSALON) 100 MG capsule; Take 1 capsule by mouth 3 times daily as needed for Cough    Acute bacterial sinusitis  -     cefUROXime (CEFTIN) 250 MG tablet; Take 1 tablet by mouth 2 times daily for 10 days    - Rest and increase fluids  - Tylenol as needed for pain and fever  - Call office with any questions or concerns, or if symptoms are getting worse or changing      Return if symptoms worsen or fail to improve. Patient instructions given and reviewed.         Electronically signed by MARVEL Spear CNP on 11/19/2018 at 2:56 PM

## 2018-12-04 ENCOUNTER — OFFICE VISIT (OUTPATIENT)
Dept: FAMILY MEDICINE CLINIC | Age: 68
End: 2018-12-04
Payer: MEDICARE

## 2018-12-04 VITALS
HEART RATE: 84 BPM | SYSTOLIC BLOOD PRESSURE: 120 MMHG | DIASTOLIC BLOOD PRESSURE: 78 MMHG | WEIGHT: 147.6 LBS | TEMPERATURE: 98.2 F | BODY MASS INDEX: 24.56 KG/M2 | RESPIRATION RATE: 16 BRPM

## 2018-12-04 DIAGNOSIS — B96.89 ACUTE BACTERIAL SINUSITIS: Primary | ICD-10-CM

## 2018-12-04 DIAGNOSIS — Z72.0 TOBACCO ABUSE: ICD-10-CM

## 2018-12-04 DIAGNOSIS — J01.90 ACUTE BACTERIAL SINUSITIS: Primary | ICD-10-CM

## 2018-12-04 PROCEDURE — 99213 OFFICE O/P EST LOW 20 MIN: CPT | Performed by: NURSE PRACTITIONER

## 2018-12-04 RX ORDER — AMOXICILLIN AND CLAVULANATE POTASSIUM 875; 125 MG/1; MG/1
1 TABLET, FILM COATED ORAL 2 TIMES DAILY
Qty: 20 TABLET | Refills: 0 | Status: SHIPPED | OUTPATIENT
Start: 2018-12-04 | End: 2018-12-14

## 2018-12-04 ASSESSMENT — ENCOUNTER SYMPTOMS
SHORTNESS OF BREATH: 0
SINUS PRESSURE: 1
SINUS COMPLAINT: 1
WHEEZING: 0
COUGH: 1
HOARSE VOICE: 1
TROUBLE SWALLOWING: 0
SORE THROAT: 0

## 2018-12-04 NOTE — PATIENT INSTRUCTIONS
Stopping Smoking: Care Instructions  Your Care Instructions  Cigarette smokers crave the nicotine in cigarettes. Giving it up is much harder than simply changing a habit. Your body has to stop craving the nicotine. It is hard to quit, but you can do it. There are many tools that people use to quit smoking. You may find that combining tools works best for you. There are several steps to quitting. First you get ready to quit. Then you get support to help you. After that, you learn new skills and behaviors to become a nonsmoker. For many people, a necessary step is getting and using medicine. Your doctor will help you set up the plan that best meets your needs. You may want to attend a smoking cessation program to help you quit smoking. When you choose a program, look for one that has proven success. Ask your doctor for ideas. You will greatly increase your chances of success if you take medicine as well as get counseling or join a cessation program.  Some of the changes you feel when you first quit tobacco are uncomfortable. Your body will miss the nicotine at first, and you may feel short-tempered and grumpy. You may have trouble sleeping or concentrating. Medicine can help you deal with these symptoms. You may struggle with changing your smoking habits and rituals. The last step is the tricky one: Be prepared for the smoking urge to continue for a time. This is a lot to deal with, but keep at it. You will feel better. Follow-up care is a key part of your treatment and safety. Be sure to make and go to all appointments, and call your doctor if you are having problems. It's also a good idea to know your test results and keep a list of the medicines you take. How can you care for yourself at home? · Ask your family, friends, and coworkers for support. You have a better chance of quitting if you have help and support.   · Join a support group, such as Nicotine Anonymous, for people who are trying to quit to keep trying. Most people are not successful the first few times they try to quit. Do not get mad at yourself if you smoke again. Make a list of things you learned and think about when you want to try again, such as next week, next month, or next year. Where can you learn more? Go to https://chpepicewche.[employer]. org and sign in to your Privepass account. Enter L785 in the "[employer] box to learn more about \"Stopping Smoking: Care Instructions. \"     If you do not have an account, please click on the \"Sign Up Now\" link. Current as of: November 29, 2017  Content Version: 11.8  © 7521-9334 Healthwise, Incorporated. Care instructions adapted under license by [facility] ([facility]). If you have questions about a medical condition or this instruction, always ask your healthcare professional. [address] any warranty or liability for your use of this information.

## 2019-05-14 ENCOUNTER — OFFICE VISIT (OUTPATIENT)
Dept: FAMILY MEDICINE CLINIC | Age: 69
End: 2019-05-14
Payer: MEDICARE

## 2019-05-14 VITALS
TEMPERATURE: 98 F | SYSTOLIC BLOOD PRESSURE: 116 MMHG | BODY MASS INDEX: 24.3 KG/M2 | RESPIRATION RATE: 18 BRPM | DIASTOLIC BLOOD PRESSURE: 60 MMHG | WEIGHT: 146 LBS | HEART RATE: 84 BPM

## 2019-05-14 DIAGNOSIS — Z72.0 TOBACCO ABUSE: ICD-10-CM

## 2019-05-14 DIAGNOSIS — G62.9 NEUROPATHY: ICD-10-CM

## 2019-05-14 DIAGNOSIS — H01.119 EYELID DERMATITIS, ALLERGIC/CONTACT: ICD-10-CM

## 2019-05-14 DIAGNOSIS — R53.83 OTHER FATIGUE: ICD-10-CM

## 2019-05-14 DIAGNOSIS — E78.2 MIXED DYSLIPIDEMIA: ICD-10-CM

## 2019-05-14 DIAGNOSIS — E78.2 MIXED HYPERLIPIDEMIA: ICD-10-CM

## 2019-05-14 DIAGNOSIS — J20.9 ACUTE BRONCHITIS, UNSPECIFIED ORGANISM: ICD-10-CM

## 2019-05-14 DIAGNOSIS — R73.01 IFG (IMPAIRED FASTING GLUCOSE): ICD-10-CM

## 2019-05-14 DIAGNOSIS — Z12.39 BREAST CANCER SCREENING: ICD-10-CM

## 2019-05-14 DIAGNOSIS — F41.9 ANXIETY: ICD-10-CM

## 2019-05-14 DIAGNOSIS — H61.23 BILATERAL IMPACTED CERUMEN: Primary | ICD-10-CM

## 2019-05-14 PROCEDURE — 69209 REMOVE IMPACTED EAR WAX UNI: CPT | Performed by: NURSE PRACTITIONER

## 2019-05-14 PROCEDURE — 99214 OFFICE O/P EST MOD 30 MIN: CPT | Performed by: NURSE PRACTITIONER

## 2019-05-14 RX ORDER — GABAPENTIN 100 MG/1
100 CAPSULE ORAL 2 TIMES DAILY
Qty: 60 CAPSULE | Refills: 5 | Status: SHIPPED | OUTPATIENT
Start: 2019-05-14 | End: 2019-06-10

## 2019-05-14 RX ORDER — AMOXICILLIN AND CLAVULANATE POTASSIUM 875; 125 MG/1; MG/1
1 TABLET, FILM COATED ORAL 2 TIMES DAILY
Qty: 20 TABLET | Refills: 0 | Status: SHIPPED | OUTPATIENT
Start: 2019-05-14 | End: 2019-05-24

## 2019-05-14 RX ORDER — DIAZEPAM 5 MG/1
5 TABLET ORAL NIGHTLY PRN
Qty: 30 TABLET | Refills: 0 | Status: SHIPPED | OUTPATIENT
Start: 2019-05-14 | End: 2019-06-10 | Stop reason: SDUPTHER

## 2019-05-14 ASSESSMENT — PATIENT HEALTH QUESTIONNAIRE - PHQ9
SUM OF ALL RESPONSES TO PHQ QUESTIONS 1-9: 0
2. FEELING DOWN, DEPRESSED OR HOPELESS: 0
SUM OF ALL RESPONSES TO PHQ9 QUESTIONS 1 & 2: 0
SUM OF ALL RESPONSES TO PHQ QUESTIONS 1-9: 0
1. LITTLE INTEREST OR PLEASURE IN DOING THINGS: 0

## 2019-05-14 ASSESSMENT — ENCOUNTER SYMPTOMS
HEARTBURN: 0
HEMOPTYSIS: 0
SORE THROAT: 0
COUGH: 1
RHINORRHEA: 1
TROUBLE SWALLOWING: 0

## 2019-05-14 NOTE — PROGRESS NOTES
[facility] FAMILY MEDICINE  [address] [address]  Dept: [phone]  Dept Fax: [phone]: [phone]     Visit Date:  5/14/2019      Patient:  Jah Morrissey  YOB: 1950    HPI:     Chief Complaint   Patient presents with    Cough     on going for a few weeks, getting worse, congested green mucus     Rash     rash above eye both side, itchy x 2-3 months        Pt presents to the office today for cough, congestion and drainage. She had this in the fall and it took a long time to get better. She has a cough that will not go away and she is bringing up green mucous. PT also has had some dry skin on her upper eye lids, they get swollen and are itchy. She has tried lotion, hydrocortisone cream, and not wearing make up with some relief, but they are still itching. Pt denies any vision problems, and only the upper lids are dry. Bilateral lower leg tingling, has been living with it for a long time. Would like to see if she can try something for the discomfort, its starting to affect her sleep. Cough   This is a new problem. The current episode started 1 to 4 weeks ago. The problem has been gradually worsening. The problem occurs constantly. The cough is productive of purulent sputum. Associated symptoms include nasal congestion, postnasal drip and rhinorrhea. Pertinent negatives include no chills, ear congestion, ear pain, fever, headaches, heartburn, hemoptysis, myalgias, rash, sore throat, sweats or weight loss. Nothing aggravates the symptoms. She has tried rest, OTC cough suppressant and steroid inhaler for the symptoms. The treatment provided mild relief. There is no history of asthma, bronchiectasis, bronchitis, COPD, emphysema, environmental allergies or pneumonia.        Medications    Current Outpatient Medications:     diazepam (VALIUM) 5 MG tablet, Take 1 tablet by mouth nightly as needed for Anxiety for up to 30 days. , Disp: 30 tablet, Rfl: 0    gabapentin (NEURONTIN) 100 MG capsule, Take 1 capsule by mouth 2 times daily for 180 days. Intended supply: 30 days, Disp: 60 capsule, Rfl: 5    amoxicillin-clavulanate (AUGMENTIN) 875-125 MG per tablet, Take 1 tablet by mouth 2 times daily for 10 days, Disp: 20 tablet, Rfl: 0    Probiotic Product (PROBIOTIC DAILY PO), Take by mouth, Disp: , Rfl:     ezetimibe (ZETIA) 10 MG tablet, Take 1 tablet by mouth daily, Disp: 90 tablet, Rfl: 3    Calcium-Magnesium (CALCIUM MAGNESIUM 750) 300-300 MG TABS, Take by mouth daily , Disp: , Rfl:     aspirin 81 MG EC tablet, Take 81 mg by mouth daily, Disp: , Rfl:     Multiple Vitamins-Minerals (MULTIVITAMIN PO), Take  by mouth daily. , Disp: , Rfl:     PROAIR  (90 Base) MCG/ACT inhaler, Inhale 2 puffs into the lungs every 4 hours as needed for Wheezing, Disp: 1 Inhaler, Rfl: 2    Acetaminophen (TYLENOL PO), Take by mouth as needed, Disp: , Rfl:     The patient is allergic to prednisone and sulfa antibiotics. Past Medical History  Queta Brewer  has a past medical history of Anxiety, Hyperlipidemia, Kidney stones, Osteoarthritis, Restless leg syndrome, Tobacco abuse, and Viral hepatitis A. Subjective:      Review of Systems   Constitutional: Negative for chills, fever and weight loss. HENT: Positive for congestion, postnasal drip and rhinorrhea. Negative for ear pain, sore throat and trouble swallowing. Respiratory: Positive for cough. Negative for hemoptysis. Gastrointestinal: Negative for heartburn. Musculoskeletal: Negative for myalgias. Skin: Negative for rash. Allergic/Immunologic: Negative for environmental allergies. Neurological: Negative for headaches. Objective:     /60   Pulse 84   Temp 98 °F (36.7 °C) (Oral)   Resp 18   Wt 146 lb (66.2 kg)   LMP  (Approximate)   BMI 24.30 kg/m²     Physical Exam   Constitutional: She is oriented to person, place, and time.  She appears well-developed and well-nourished. No distress. HENT:   Head: Normocephalic and atraumatic. Right Ear: Hearing, tympanic membrane, external ear and ear canal normal.   Left Ear: Hearing, tympanic membrane, external ear and ear canal normal.   Nose: Mucosal edema and sinus tenderness present. Right sinus exhibits no maxillary sinus tenderness and no frontal sinus tenderness. Left sinus exhibits no maxillary sinus tenderness and no frontal sinus tenderness. Mouth/Throat: Uvula is midline and mucous membranes are normal. Posterior oropharyngeal erythema present. No posterior oropharyngeal edema or tonsillar abscesses. Eyes: Conjunctivae are normal. Right eye exhibits no discharge. Left eye exhibits no discharge. Neck: Normal range of motion. Neck supple. No tracheal deviation present. Cardiovascular: Normal rate, regular rhythm and normal heart sounds. No murmur heard. Pulmonary/Chest: Effort normal. No respiratory distress. She has wheezes (scattered exp.). Harsh, productive cough noted   Abdominal: Soft. Bowel sounds are normal. There is no tenderness. Musculoskeletal: Normal range of motion. She exhibits no edema. Neurological: She is alert and oriented to person, place, and time. Coordination normal.   Skin: Skin is warm and dry. No rash noted. Psychiatric: She has a normal mood and affect. Her behavior is normal. Judgment and thought content normal.   Vitals reviewed. Assessment/Plan:      Holly Brooke was seen today for cough and rash. Diagnoses and all orders for this visit:    Bilateral impacted cerumen  -     MD REMOVAL IMPACTED CERUMEN IRRIGATION/LVG UNILAT    Anxiety  -     diazepam (VALIUM) 5 MG tablet; Take 1 tablet by mouth nightly as needed for Anxiety for up to 30 days. Neuropathy  -     gabapentin (NEURONTIN) 100 MG capsule; Take 1 capsule by mouth 2 times daily for 180 days.  Intended supply: 30 days    Breast cancer screening  -     [facility] DIGITAL POST SCREEN W OR WO CAD BILATERAL; Future    Tobacco abuse    Mixed dyslipidemia  -     Lipid Panel; Future  -     Hemoglobin A1C; Future    Mixed hyperlipidemia  -     Lipid Panel; Future  -     Hemoglobin A1C; Future    Acute bronchitis, unspecified organism  -     amoxicillin-clavulanate (AUGMENTIN) 875-125 MG per tablet; Take 1 tablet by mouth 2 times daily for 10 days    Other fatigue  -     Comprehensive Metabolic Panel; Future  -     CBC Auto Differential; Future  -     TSH without Reflex; Future  -     T4, Free; Future  -     Hepatic Function Panel; Future    Eyelid dermatitis, allergic/contact    IFG (impaired fasting glucose)  -     Hemoglobin A1C; Future      - Aquaphour ointment to eye lids 2 times daily  - No makeup  - Scent free soap to wash  - Will do a trial of gabapentin for pain in feet and legs and follow up on this in 1 month. - Pt is also due for annual follow up and exam, labs ordered  - Rest and increase fluids for bronchitis  - Refill medications today  - Call office with any questions or concerns, or if symptoms are getting worse or changing      Return in about 1 month (around 6/11/2019) for Routine follow up, Medication check. Patient given educational materials - see patient instructions. Discussed use, benefit, and side effects of prescribed medications. All patient questions answered. Pt voiced understanding. Controlled Substances Monitoring:     RX Monitoring 5/14/2019   Attestation The Prescription Monitoring Report for this patient was reviewed today.    Chronic Pain Routine Monitoring No signs of potential drug abuse or diversion identified: otherwise, see note documentation          Electronically signed by MARVEL Teague CNP on 5/15/2019 at 10:25 AM

## 2019-05-14 NOTE — PATIENT INSTRUCTIONS
Aquaphour ointment to eye lids 2 times daily  No makeup  Scent free soap to wash    Patient Education        Cellulitis of the Eye: Care Instructions  Your Care Instructions    Cellulitis of the eye is an infection of the skin and tissues around the eye. It is also called preseptal cellulitis or periorbital cellulitis. It is usually caused by bacteria. This type of infection may happen after a sinus infection or a dental infection. It could also happen after an insect bite or an injury to the face. It most often occurs where there is a break in the skin. Cellulitis of the eye can be very serious. It's important to treat it right away. If you do, it usually goes away without lasting problems. Medicine and home treatment can help you get better. Follow-up care is a key part of your treatment and safety. Be sure to make and go to all appointments, and call your doctor if you are having problems. It's also a good idea to know your test results and keep a list of the medicines you take. How can you care for yourself at home? · Take your antibiotics as directed. Do not stop taking them just because you feel better. You need to take the full course of antibiotics. · Do not wear contact lenses unless your doctor tells you it is okay. · Put your head on pillows, and put a cool, damp cloth on your eye. This can reduce swelling and pain. · If your doctor recommends it, use a warm pack on your eye. · Keep the skin around your eye clean and dry. · Be safe with medicines. Read and follow all instructions on the label. ? If the doctor gave you a prescription medicine for pain, take it as prescribed. ? If you are not taking a prescription pain medicine, ask your doctor if you can take an over-the-counter medicine. To prevent cellulitis  · Wash your hands well after you use the bathroom and before and after you eat. · Do not rub or pick at the skin around your eyes.  Cellulitis occurs most often where there is a break in the skin. · If you get a cut, pimple, or insect bite near your eye, clean the area as soon as you can. This can help prevent an infection. ? Wash the area with cool water and a mild soap, such as [place]. ? Do not use rubbing alcohol, hydrogen peroxide, iodine, or Mercurochrome. These can harm the tissues and slow healing. · Call your doctor if you have a sinus infection with redness or swelling of your eyes. When should you call for help? Call your doctor now or seek immediate medical care if:    · You have new or worse signs of an eye infection, such as:  ? Pus or thick discharge coming from the eye.  ? Redness or swelling around the eye.  ? A fever.     · Your eye seems to be bulging out.     · You seem to be getting sicker.     · You have vision changes.    Watch closely for changes in your health, and be sure to contact your doctor if:    · You do not get better as expected. Where can you learn more? Go to https://BeiZpeTechniScan.[employer]. org and sign in to your [employer] account. Enter [phone] in the [facility] box to learn more about \"Cellulitis of the Eye: Care Instructions. \"     If you do not have an account, please click on the \"Sign Up Now\" link. Current as of: July 17, 2018  Content Version: 12.0  © 6453-5316 Healthwise, Incorporated. Care instructions adapted under license by [facility] ([facility]). If you have questions about a medical condition or this instruction, always ask your healthcare professional. [place] [address] any warranty or liability for your use of this information. Patient Education        Bronchitis: Care Instructions  Your Care Instructions    Bronchitis is inflammation of the bronchial tubes, which carry air to the lungs. The tubes swell and produce mucus, or phlegm. The mucus and inflamed bronchial tubes make you cough. You may have trouble breathing. Most cases of bronchitis are caused by viruses like those that cause colds.  Antibiotics usually do not help and they may be harmful. Bronchitis usually develops rapidly and lasts about 2 to 3 weeks in otherwise healthy people. Follow-up care is a key part of your treatment and safety. Be sure to make and go to all appointments, and call your doctor if you are having problems. It's also a good idea to know your test results and keep a list of the medicines you take. How can you care for yourself at home? · Take all medicines exactly as prescribed. Call your doctor if you think you are having a problem with your medicine. · Get some extra rest.  · Take an over-the-counter pain medicine, such as acetaminophen (Tylenol), ibuprofen (Advil, Motrin), or naproxen (Aleve) to reduce fever and relieve body aches. Read and follow all instructions on the label. · Do not take two or more pain medicines at the same time unless the doctor told you to. Many pain medicines have acetaminophen, which is Tylenol. Too much acetaminophen (Tylenol) can be harmful. · Take an over-the-counter cough medicine that contains dextromethorphan to help quiet a dry, hacking cough so that you can sleep. Avoid cough medicines that have more than one active ingredient. Read and follow all instructions on the label. · Breathe moist air from a humidifier, hot shower, or sink filled with hot water. The heat and moisture will thin mucus so you can cough it out. · Do not smoke. Smoking can make bronchitis worse. If you need help quitting, talk to your doctor about stop-smoking programs and medicines. These can increase your chances of quitting for good. When should you call for help? Call 911 anytime you think you may need emergency care.  For example, call if:    · You have severe trouble breathing.    Call your doctor now or seek immediate medical care if:    · You have new or worse trouble breathing.     · You cough up dark brown or bloody mucus (sputum).     · You have a new or higher fever.     · You have a new rash.   Suzy Silva closely for changes in your health, and be sure to contact your doctor if:    · You cough more deeply or more often, especially if you notice more mucus or a change in the color of your mucus.     · You are not getting better as expected. Where can you learn more? Go to https://chpepiceweb.[employer]. org and sign in to your Jo[employer] account. Enter H333 in the [employer] box to learn more about \"Bronchitis: Care Instructions. \"     If you do not have an account, please click on the \"Sign Up Now\" link. Current as of: September 5, 2018  Content Version: 12.0  © 3234-6455 Healthwise, Incorporated. Care instructions adapted under license by [facility] ([facility]). If you have questions about a medical condition or this instruction, always ask your healthcare professional. [address] any warranty or liability for your use of this information.

## 2019-05-19 LAB
ABSOLUTE BASO #: 0.1 K/UL (ref 0–0.1)
ABSOLUTE EOS #: 0.2 K/UL (ref 0.1–0.4)
ABSOLUTE LYMPH #: 2.1 K/UL (ref 0.8–5.2)
ABSOLUTE MONO #: 0.5 K/UL (ref 0.1–0.9)
ABSOLUTE NEUT #: 2.8 K/UL (ref 1.3–9.1)
ALBUMIN SERPL-MCNC: 4.2 G/DL (ref 3.2–5.3)
ALK PHOSPHATASE: 102 U/L (ref 39–130)
ALT SERPL-CCNC: 16 U/L (ref 0–31)
ANION GAP SERPL CALCULATED.3IONS-SCNC: 7 MMOL/L (ref 4–12)
AST SERPL-CCNC: 16 U/L (ref 0–41)
AVERAGE GLUCOSE: 114 MG/DL (ref 66–114)
BASOPHILS RELATIVE PERCENT: 1.1 %
BILIRUB SERPL-MCNC: 0.8 MG/DL (ref 0.3–1.2)
BILIRUBIN DIRECT: 0.1 MG/DL (ref 0–0.4)
BUN BLDV-MCNC: 16 MG/DL (ref 5–27)
CALCIUM SERPL-MCNC: 9.7 MG/DL (ref 8.5–10.5)
CHLORIDE BLD-SCNC: 110 MMOL/L (ref 98–109)
CHOLESTEROL/HDL RATIO: 3.3 (ref 1–5)
CHOLESTEROL: 217 MG/DL (ref 150–200)
CO2: 27 MMOL/L (ref 22–32)
CREAT SERPL-MCNC: 0.71 MG/DL (ref 0.4–1)
EGFR AFRICAN AMERICAN: >60 ML/MIN/1.73SQ.M
EGFR IF NONAFRICAN AMERICAN: >60 ML/MIN/1.73SQ.M
EOSINOPHILS RELATIVE PERCENT: 3.7 %
GLUCOSE: 103 MG/DL (ref 65–99)
HBA1C MFR BLD: 5.6 %
HCT VFR BLD CALC: 45.5 % (ref 36–48)
HDLC SERPL-MCNC: 66 MG/DL
HEMOGLOBIN: 15.5 G/DL (ref 12–16)
LDL CHOLESTEROL CALCULATED: 127 MG/DL
LDL/HDL RATIO: 1.9
LYMPHOCYTE %: 37.2 %
MCH RBC QN AUTO: 31.6 PG (ref 27–34)
MCHC RBC AUTO-ENTMCNC: 34.1 G/DL (ref 31–36)
MCV RBC AUTO: 92.9 FL (ref 80–100)
MONOCYTES # BLD: 9.1 %
NEUTROPHILS RELATIVE PERCENT: 48.5 %
PDW BLD-RTO: 13.3 % (ref 10.8–14.8)
PLATELETS: 232 K/UL (ref 150–450)
POTASSIUM SERPL-SCNC: 4.7 MMOL/L (ref 3.5–5)
RBC: 4.9 M/UL (ref 4–5.5)
SODIUM BLD-SCNC: 144 MMOL/L (ref 134–146)
T4 FREE: 1.06 NG/DL (ref 0.61–1.6)
TOTAL PROTEIN: 6.3 G/DL (ref 6–8)
TRIGL SERPL-MCNC: 119 MG/DL (ref 27–150)
TSH SERPL DL<=0.05 MIU/L-ACNC: 0.59 UIU/ML (ref 0.49–4.67)
VLDLC SERPL CALC-MCNC: 24 MG/DL (ref 0–30)
WBC: 5.7 K/UL (ref 3.7–10.8)

## 2019-05-21 ENCOUNTER — TELEPHONE (OUTPATIENT)
Dept: FAMILY MEDICINE CLINIC | Age: 69
End: 2019-05-21

## 2019-05-21 NOTE — TELEPHONE ENCOUNTER
I called the pt and left a message on her voicemail to call the office back. Unable to leave a detailed message per HIPAA.

## 2019-05-21 NOTE — TELEPHONE ENCOUNTER
----- Message from MARVEL Lopez CNP sent at 5/19/2019  3:45 PM EDT -----  Please call pt and let her know that her lab work showed her HGA1C was slightly elevated at 5.6, her total cholesterol was 217 and her LDL was elevated at 123. She does not need medications at this point, but she does need to work on diet and exercise (30 min 4-5 time per week). Her CBC and thyroid numbers were WNL. Follow up for full physical on 6/10/19 as planned.  -ABEL

## 2019-06-10 ENCOUNTER — OFFICE VISIT (OUTPATIENT)
Dept: FAMILY MEDICINE CLINIC | Age: 69
End: 2019-06-10
Payer: MEDICARE

## 2019-06-10 VITALS
SYSTOLIC BLOOD PRESSURE: 116 MMHG | BODY MASS INDEX: 23.93 KG/M2 | DIASTOLIC BLOOD PRESSURE: 64 MMHG | RESPIRATION RATE: 14 BRPM | HEART RATE: 84 BPM | WEIGHT: 143.8 LBS | TEMPERATURE: 98.4 F

## 2019-06-10 DIAGNOSIS — R20.2 NUMBNESS AND TINGLING OF BOTH LEGS BELOW KNEES: ICD-10-CM

## 2019-06-10 DIAGNOSIS — F41.9 ANXIETY: ICD-10-CM

## 2019-06-10 DIAGNOSIS — R20.0 NUMBNESS AND TINGLING OF BOTH LEGS BELOW KNEES: ICD-10-CM

## 2019-06-10 DIAGNOSIS — G62.9 NEUROPATHY: ICD-10-CM

## 2019-06-10 DIAGNOSIS — M79.671 BILATERAL FOOT PAIN: Primary | ICD-10-CM

## 2019-06-10 DIAGNOSIS — M79.672 BILATERAL FOOT PAIN: Primary | ICD-10-CM

## 2019-06-10 DIAGNOSIS — H61.21 IMPACTED CERUMEN OF RIGHT EAR: ICD-10-CM

## 2019-06-10 PROCEDURE — 69209 REMOVE IMPACTED EAR WAX UNI: CPT | Performed by: NURSE PRACTITIONER

## 2019-06-10 PROCEDURE — 99213 OFFICE O/P EST LOW 20 MIN: CPT | Performed by: NURSE PRACTITIONER

## 2019-06-10 RX ORDER — DIAZEPAM 5 MG/1
5 TABLET ORAL NIGHTLY PRN
Qty: 30 TABLET | Refills: 0 | Status: SHIPPED | OUTPATIENT
Start: 2019-06-10 | End: 2019-10-25 | Stop reason: SDUPTHER

## 2019-06-10 RX ORDER — GABAPENTIN 300 MG/1
300 CAPSULE ORAL 2 TIMES DAILY
Qty: 60 CAPSULE | Refills: 5 | Status: SHIPPED | OUTPATIENT
Start: 2019-06-10 | End: 2019-12-10 | Stop reason: ALTCHOICE

## 2019-06-10 ASSESSMENT — ENCOUNTER SYMPTOMS
DIARRHEA: 0
COUGH: 0

## 2019-06-10 NOTE — PROGRESS NOTES
normal. A middle ear effusion (small amount of wax build up in canal) is present. Left Ear: Hearing, tympanic membrane, external ear and ear canal normal.   Nose: Nose normal.   Mouth/Throat: Uvula is midline, oropharynx is clear and moist and mucous membranes are normal.   Eyes: Conjunctivae are normal. Right eye exhibits no discharge. Left eye exhibits no discharge. Neck: Normal range of motion. Neck supple. Cardiovascular:   Pulses:       Dorsalis pedis pulses are 2+ on the right side, and 2+ on the left side. Posterior tibial pulses are 2+ on the right side, and 2+ on the left side. Pulmonary/Chest: Effort normal. No respiratory distress. Musculoskeletal:        Right foot: There is tenderness (bottom of the foot along the plantar fascia). There is normal range of motion. Left foot: There is tenderness (bottom of foot along the plantar fascia ). There is normal range of motion and no swelling. Feet:   Right Foot:   Skin Integrity: Negative for ulcer or blister. Left Foot:   Skin Integrity: Negative for ulcer or blister. Neurological: She is alert and oriented to person, place, and time. Coordination normal.   Skin: Skin is warm and dry. Psychiatric: She has a normal mood and affect. Her behavior is normal. Judgment and thought content normal.   Vitals reviewed.       Component      Latest Ref Rng & Units 5/18/2019   WBC      3.7 - 10.8 K/ul 5.7   RBC      4.00 - 5.50 M/ul 4.90   Hemoglobin Quant      12.0 - 16.0 g/dL 15.5   Hematocrit      36.0 - 48.0 % 45.5   MCV      80 - 100 fl 92.9   MCH      27.0 - 34.0 pg 31.6   MCHC      31.0 - 36.0 g/dl 34.1   RDW      10.8 - 14.8 % 13.3   Platelet Count      149 - 450 K/ul 232   Absolute Neut #      1.3 - 9.1 K/ul 2.8   Neutrophils %      % 48.5   Absolute Lymph #      0.8 - 5.2 K/ul 2.1   Lymphocyte %      % 37.2   Absolute Mono #      0.1 - 0.9 K/ul 0.5   Monocytes      % 9.1   Absolute Eos #      0.1 - 0.4 K/ul 0.2   Eosinophils % can start the 300 mg BID tablets. - Call office with any questions or concerns, or if symptoms are getting worse or changing  - Refills today    Return in about 1 year (around 6/10/2020) for Routine follow up. Patient given educational materials - see patient instructions. Discussed use, benefit, and side effects of prescribed medications. All patient questions answered. Pt voiced understanding.         Electronically signed by MARVEL Ludwig CNP on 6/10/2019 at 3:50 PM

## 2019-06-10 NOTE — PROGRESS NOTES
Per order of WS a right ear irrigation was performed using warm water. A very small amount of light brown cerumen was removed from the right ear, cerumen spoon and Alligator forceps used. Pt tolerated well. TM visualized.

## 2019-06-10 NOTE — PATIENT INSTRUCTIONS
care if:    · You cannot move or stand on your foot.     · Your foot looks twisted or out of its normal position.     · Your foot is not stable when you step down.     · You have signs of infection, such as:  ? Increased pain, swelling, warmth, or redness. ? Red streaks leading from the sore area. ? Pus draining from a place on your foot. ? A fever.     · Your foot is numb or tingly.    Watch closely for changes in your health, and be sure to contact your doctor if:    · You do not get better as expected.     · You have bruises from an injury that last longer than 2 weeks. Where can you learn more? Go to https://BitLitpeBkam.Niche. org and sign in to your Temptster account. Enter U253 in the Westhouse box to learn more about \"Foot Pain: Care Instructions. \"     If you do not have an account, please click on the \"Sign Up Now\" link. Current as of: September 20, 2018  Content Version: 12.0  © 9214-9206 QualySense. Care instructions adapted under license by [facility] ([facility]). If you have questions about a medical condition or this instruction, always ask your healthcare professional. [place] [address] any warranty or liability for your use of this information. Patient Education        Numbness and Tingling: Care Instructions  Your Care Instructions    Many things can cause numbness or tingling. Swelling may put pressure on a nerve. This could cause you to lose feeling or have a pins-and-needles sensation on part of your body. Nerves may be damaged from trauma, toxins, or diseases, such as diabetes or multiple sclerosis (MS). Sometimes, though, the cause is not clear. If there is no clear reason for your symptoms, and you are not having any other symptoms, your doctor may suggest watching and waiting for a while to see if the numbness or tingling goes away on its own.  Your doctor may want you to have blood or nerve tests to find the cause of your symptoms. Follow-up care is a key part of your treatment and safety. Be sure to make and go to all appointments, and call your doctor if you are having problems. It's also a good idea to know your test results and keep a list of the medicines you take. How can you care for yourself at home? · If your doctor prescribes medicine, take it exactly as directed. Call your doctor if you think you are having a problem with your medicine. · If you have any swelling, put ice or a cold pack on the area for 10 to 20 minutes at a time. Put a thin cloth between the ice and your skin. When should you call for help? Call 911 anytime you think you may need emergency care. For example, call if:    · You have weakness, numbness, or tingling in both legs.     · You lose bowel or bladder control.     · You have symptoms of a stroke. These may include:  ? Sudden numbness, tingling, weakness, or loss of movement in your face, arm, or leg, especially on only one side of your body. ? Sudden vision changes. ? Sudden trouble speaking. ? Sudden confusion or trouble understanding simple statements. ? Sudden problems with walking or balance. ? A sudden, severe headache that is different from past headaches.    Watch closely for changes in your health, and be sure to contact your doctor if you have any problems, or if:    · You do not get better as expected. Where can you learn more? Go to https://Gilon Business InsightpeyuanewShareYourCart.Skeeble. org and sign in to your Nervogrid account. Enter P793 in the Drop Development box to learn more about \"Numbness and Tingling: Care Instructions. \"     If you do not have an account, please click on the \"Sign Up Now\" link. Current as of: Becka 3, 2018  Content Version: 12.0  © 0817-3801 Healthwise, Incorporated. Care instructions adapted under license by [facility] ([facility]).  If you have questions about a medical condition or this instruction, always ask your healthcare professional. Lydia Cervantes disclaims any warranty or liability for your use of this information.

## 2019-10-24 DIAGNOSIS — F41.9 ANXIETY: ICD-10-CM

## 2019-10-25 RX ORDER — DIAZEPAM 5 MG/1
5 TABLET ORAL NIGHTLY PRN
Qty: 30 TABLET | Refills: 0 | Status: SHIPPED | OUTPATIENT
Start: 2019-10-25 | End: 2019-12-10 | Stop reason: SDUPTHER

## 2019-12-09 ENCOUNTER — TELEPHONE (OUTPATIENT)
Dept: FAMILY MEDICINE CLINIC | Age: 69
End: 2019-12-09

## 2019-12-10 ENCOUNTER — OFFICE VISIT (OUTPATIENT)
Dept: FAMILY MEDICINE CLINIC | Age: 69
End: 2019-12-10
Payer: MEDICARE

## 2019-12-10 VITALS
HEART RATE: 80 BPM | BODY MASS INDEX: 24.3 KG/M2 | WEIGHT: 146 LBS | DIASTOLIC BLOOD PRESSURE: 60 MMHG | TEMPERATURE: 97.8 F | SYSTOLIC BLOOD PRESSURE: 110 MMHG | RESPIRATION RATE: 16 BRPM

## 2019-12-10 DIAGNOSIS — J20.9 BRONCHITIS, ACUTE, WITH BRONCHOSPASM: Primary | ICD-10-CM

## 2019-12-10 DIAGNOSIS — F41.9 ANXIETY: ICD-10-CM

## 2019-12-10 DIAGNOSIS — H65.91 OME (OTITIS MEDIA WITH EFFUSION), RIGHT: ICD-10-CM

## 2019-12-10 PROCEDURE — 99214 OFFICE O/P EST MOD 30 MIN: CPT | Performed by: NURSE PRACTITIONER

## 2019-12-10 RX ORDER — DEXTROMETHORPHAN HYDROBROMIDE AND PROMETHAZINE HYDROCHLORIDE 15; 6.25 MG/5ML; MG/5ML
5 SYRUP ORAL 4 TIMES DAILY PRN
Qty: 118 ML | Refills: 0 | Status: SHIPPED | OUTPATIENT
Start: 2019-12-10 | End: 2019-12-17

## 2019-12-10 RX ORDER — DIAZEPAM 5 MG/1
5 TABLET ORAL NIGHTLY PRN
Qty: 30 TABLET | Refills: 0 | Status: SHIPPED | OUTPATIENT
Start: 2019-12-10 | End: 2020-02-20 | Stop reason: SDUPTHER

## 2019-12-10 RX ORDER — CEFDINIR 300 MG/1
300 CAPSULE ORAL 2 TIMES DAILY
Qty: 20 CAPSULE | Refills: 0 | Status: SHIPPED | OUTPATIENT
Start: 2019-12-10 | End: 2019-12-20

## 2019-12-10 RX ORDER — VITAMIN B COMPLEX
1 CAPSULE ORAL
COMMUNITY

## 2019-12-10 ASSESSMENT — ENCOUNTER SYMPTOMS
SINUS PAIN: 0
SORE THROAT: 0
RHINORRHEA: 1
WHEEZING: 0
ABDOMINAL PAIN: 0
COUGH: 1
TROUBLE SWALLOWING: 0
SHORTNESS OF BREATH: 0
SINUS PRESSURE: 0
DIARRHEA: 0

## 2020-02-19 NOTE — TELEPHONE ENCOUNTER
Victorino Vallejo called requesting a refill on the following medications:  Requested Prescriptions     Pending Prescriptions Disp Refills    diazePAM (VALIUM) 5 MG tablet 30 tablet 0     Sig: Take 1 tablet by mouth nightly as needed for Anxiety for up to 30 days.      Pharmacy verified:  [facility]      Date of last visit: 12/10/19  Date of next visit (if applicable): Visit date not found

## 2020-02-20 RX ORDER — DIAZEPAM 5 MG/1
5 TABLET ORAL NIGHTLY PRN
Qty: 30 TABLET | Refills: 0 | Status: SHIPPED | OUTPATIENT
Start: 2020-02-20 | End: 2020-04-06 | Stop reason: SDUPTHER

## 2020-02-20 NOTE — TELEPHONE ENCOUNTER
OK for refill -WS    Controlled Substance Monitoring:    Acute and Chronic Pain Monitoring:   RX Monitoring 2/20/2020   Attestation -   Periodic Controlled Substance Monitoring No signs of potential drug abuse or diversion identified.;Obtaining appropriate analgesic effect of treatment.

## 2020-03-02 ENCOUNTER — TELEPHONE (OUTPATIENT)
Dept: FAMILY MEDICINE CLINIC | Age: 70
End: 2020-03-02

## 2020-03-02 NOTE — TELEPHONE ENCOUNTER
Mike Jennings calls for an appointment after 1:00 today with Laureano Wilson if possible. She had some dizziness and nausea on Friday 2/28/20 but has had nothing since Friday. She took a baby aspirin but it really didn't do anything. She slept off and on all day in a sitting position on Friday becausing laying down made her dizziness worse. She is requesting an appointment this afternoon but if you need to schedule for tomorrow morning, she has to request it off of work today. Please advise patient.     MING  12/10/19    [facility]

## 2020-03-03 ENCOUNTER — NURSE ONLY (OUTPATIENT)
Dept: LAB | Age: 70
End: 2020-03-03

## 2020-03-03 ENCOUNTER — TELEPHONE (OUTPATIENT)
Dept: CARDIOLOGY CLINIC | Age: 70
End: 2020-03-03

## 2020-03-03 ENCOUNTER — OFFICE VISIT (OUTPATIENT)
Dept: FAMILY MEDICINE CLINIC | Age: 70
End: 2020-03-03
Payer: MEDICARE

## 2020-03-03 VITALS
HEART RATE: 96 BPM | WEIGHT: 145.8 LBS | BODY MASS INDEX: 24.26 KG/M2 | DIASTOLIC BLOOD PRESSURE: 62 MMHG | RESPIRATION RATE: 18 BRPM | SYSTOLIC BLOOD PRESSURE: 118 MMHG

## 2020-03-03 LAB
ALBUMIN SERPL-MCNC: 4.3 G/DL (ref 3.5–5.1)
ALP BLD-CCNC: 104 U/L (ref 38–126)
ALT SERPL-CCNC: 23 U/L (ref 11–66)
ANION GAP SERPL CALCULATED.3IONS-SCNC: 12 MEQ/L (ref 8–16)
AST SERPL-CCNC: 24 U/L (ref 5–40)
BASOPHILS # BLD: 0.5 %
BASOPHILS ABSOLUTE: 0 THOU/MM3 (ref 0–0.1)
BILIRUB SERPL-MCNC: 0.4 MG/DL (ref 0.3–1.2)
BUN BLDV-MCNC: 19 MG/DL (ref 7–22)
CALCIUM SERPL-MCNC: 9.3 MG/DL (ref 8.5–10.5)
CHLORIDE BLD-SCNC: 106 MEQ/L (ref 98–111)
CO2: 22 MEQ/L (ref 23–33)
CREAT SERPL-MCNC: 0.7 MG/DL (ref 0.4–1.2)
EOSINOPHIL # BLD: 2.6 %
EOSINOPHILS ABSOLUTE: 0.2 THOU/MM3 (ref 0–0.4)
ERYTHROCYTE [DISTWIDTH] IN BLOOD BY AUTOMATED COUNT: 13.7 % (ref 11.5–14.5)
ERYTHROCYTE [DISTWIDTH] IN BLOOD BY AUTOMATED COUNT: 48.8 FL (ref 35–45)
GFR SERPL CREATININE-BSD FRML MDRD: 83 ML/MIN/1.73M2
GLUCOSE BLD-MCNC: 93 MG/DL (ref 70–108)
HCT VFR BLD CALC: 45.4 % (ref 37–47)
HEMOGLOBIN: 14.8 GM/DL (ref 12–16)
IMMATURE GRANS (ABS): 0.02 THOU/MM3 (ref 0–0.07)
IMMATURE GRANULOCYTES: 0.3 %
LYMPHOCYTES # BLD: 38.6 %
LYMPHOCYTES ABSOLUTE: 2.8 THOU/MM3 (ref 1–4.8)
MCH RBC QN AUTO: 31.7 PG (ref 26–33)
MCHC RBC AUTO-ENTMCNC: 32.6 GM/DL (ref 32.2–35.5)
MCV RBC AUTO: 97.2 FL (ref 81–99)
MONOCYTES # BLD: 8.6 %
MONOCYTES ABSOLUTE: 0.6 THOU/MM3 (ref 0.4–1.3)
NUCLEATED RED BLOOD CELLS: 0 /100 WBC
PLATELET # BLD: 224 THOU/MM3 (ref 130–400)
PMV BLD AUTO: 10.4 FL (ref 9.4–12.4)
POTASSIUM SERPL-SCNC: 4.4 MEQ/L (ref 3.5–5.2)
RBC # BLD: 4.67 MILL/MM3 (ref 4.2–5.4)
SEG NEUTROPHILS: 49.4 %
SEGMENTED NEUTROPHILS ABSOLUTE COUNT: 3.6 THOU/MM3 (ref 1.8–7.7)
SODIUM BLD-SCNC: 140 MEQ/L (ref 135–145)
T4 FREE: 1.34 NG/DL (ref 0.93–1.76)
TOTAL PROTEIN: 6.6 G/DL (ref 6.1–8)
TSH SERPL DL<=0.05 MIU/L-ACNC: 1.22 UIU/ML (ref 0.4–4.2)
WBC # BLD: 7.3 THOU/MM3 (ref 4.8–10.8)

## 2020-03-03 PROCEDURE — 99214 OFFICE O/P EST MOD 30 MIN: CPT | Performed by: NURSE PRACTITIONER

## 2020-03-03 PROCEDURE — 93000 ELECTROCARDIOGRAM COMPLETE: CPT | Performed by: NURSE PRACTITIONER

## 2020-03-03 PROCEDURE — 69209 REMOVE IMPACTED EAR WAX UNI: CPT | Performed by: NURSE PRACTITIONER

## 2020-03-03 ASSESSMENT — ENCOUNTER SYMPTOMS
VISUAL CHANGE: 0
SWOLLEN GLANDS: 0
NAUSEA: 1
CHANGE IN BOWEL HABIT: 0
COUGH: 0
ABDOMINAL PAIN: 0
SORE THROAT: 0
VOMITING: 0

## 2020-03-03 NOTE — TELEPHONE ENCOUNTER
LM for pt to return call.      Zeinab FLOWERS [facility] Clinical Support Pool             Abnormal EKG

## 2020-03-03 NOTE — PATIENT INSTRUCTIONS
Patient Education        Dizziness: Care Instructions  Your Care Instructions  Dizziness is the feeling of unsteadiness or fuzziness in your head. It is different than having vertigo, which is a feeling that the room is spinning or that you are moving or falling. It is also different from lightheadedness, which is the feeling that you are about to faint. It can be hard to know what causes dizziness. Some people feel dizzy when they have migraine headaches. Sometimes bouts of flu can make you feel dizzy. Some medical conditions, such as heart problems or high blood pressure, can make you feel dizzy. Many medicines can cause dizziness, including medicines for high blood pressure, pain, or anxiety. If a medicine causes your symptoms, your doctor may recommend that you stop or change the medicine. If it is a problem with your heart, you may need medicine to help your heart work better. If there is no clear reason for your symptoms, your doctor may suggest watching and waiting for a while to see if the dizziness goes away on its own. Follow-up care is a key part of your treatment and safety. Be sure to make and go to all appointments, and call your doctor if you are having problems. It's also a good idea to know your test results and keep a list of the medicines you take. How can you care for yourself at home? · If your doctor recommends or prescribes medicine, take it exactly as directed. Call your doctor if you think you are having a problem with your medicine. · Do not drive while you feel dizzy. · Try to prevent falls. Steps you can take include:  ? Using nonskid mats, adding grab bars near the tub, and using night-lights. ? Clearing your home so that walkways are free of anything you might trip on.  ? Letting family and friends know that you have been feeling dizzy. This will help them know how to help you. When should you call for help? Call 911 anytime you think you may need emergency care.  For Instructions  Overview    It's normal for blood pressure to go up and down throughout the day. But if it stays up, you have high blood pressure. Another name for high blood pressure is hypertension. Despite what a lot of people think, high blood pressure usually doesn't cause headaches or make you feel dizzy or lightheaded. It usually has no symptoms. But it does increase your risk of stroke, heart attack, and other problems. You and your doctor will talk about your risks of these problems based on your blood pressure. Your doctor will give you a goal for your blood pressure. Your goal will be based on your health and your age. Lifestyle changes, such as eating healthy and being active, are always important to help lower blood pressure. You might also take medicine to reach your blood pressure goal.  Follow-up care is a key part of your treatment and safety. Be sure to make and go to all appointments, and call your doctor if you are having problems. It's also a good idea to know your test results and keep a list of the medicines you take. How can you care for yourself at home? Medical treatment  · If you stop taking your medicine, your blood pressure will go back up. You may take one or more types of medicine to lower your blood pressure. Be safe with medicines. Take your medicine exactly as prescribed. Call your doctor if you think you are having a problem with your medicine. · Talk to your doctor before you start taking aspirin every day. Aspirin can help certain people lower their risk of a heart attack or stroke. But taking aspirin isn't right for everyone, because it can cause serious bleeding. · See your doctor regularly. You may need to see the doctor more often at first or until your blood pressure comes down. · If you are taking blood pressure medicine, talk to your doctor before you take decongestants or anti-inflammatory medicine, such as ibuprofen.  Some of these medicines can raise blood stroke. These may include:  ? Sudden numbness, tingling, weakness, or loss of movement in your face, arm, or leg, especially on only one side of your body. ? Sudden vision changes. ? Sudden trouble speaking. ? Sudden confusion or trouble understanding simple statements. ? Sudden problems with walking or balance. ? A sudden, severe headache that is different from past headaches.     · You have severe back or belly pain.    Do not wait until your blood pressure comes down on its own. Get help right away.   Call your doctor now or seek immediate care if:    · Your blood pressure is much higher than normal (such as 180/120 or higher), but you don't have symptoms.     · You think high blood pressure is causing symptoms, such as:  ? Severe headache.  ? Blurry vision.    Watch closely for changes in your health, and be sure to contact your doctor if:    · Your blood pressure measures higher than your doctor recommends at least 2 times. That means the top number is higher or the bottom number is higher, or both.     · You think you may be having side effects from your blood pressure medicine. Where can you learn more? Go to https://AppArchitect.WSI Onlinebiz. org and sign in to your TimZon account. Enter G815 in the Extreme DA box to learn more about \"High Blood Pressure: Care Instructions. \"     If you do not have an account, please click on the \"Sign Up Now\" link. Current as of: April 9, 2019  Content Version: 12.3  © 5332-1948 Healthwise, Incorporated. Care instructions adapted under license by [facility] ([facility]). If you have questions about a medical condition or this instruction, always ask your healthcare professional. [address] any warranty or liability for your use of this information.

## 2020-03-03 NOTE — PROGRESS NOTES
[address]  [address] [facility] [address]  Dept: [phone]  Dept Fax: [phone]: [phone]     Visit Date:  3/3/2020      Patient:  Jonel Angelucci  YOB: 1950    HPI:     Chief Complaint   Patient presents with    Nausea     started on friday and has had it on and off the last several months, increased BP and heart rate when this happened, 170s/100    Dizziness       Pt presents to the office today with increasing episodes of dizziness. She reports that she had one episode last summer, and again about 1 month ago, and another one 4 days ago. She reports that she was just doing housework and she got dizzy and her chest was tights and she had to sit down. She does have a HX of anxiety, and is still smoking. Currently she denies any chest pain, dizziness, or SOB. She just feels \"off\", and also has nausea with these episodes. Dizziness   This is a recurrent problem. The current episode started in the past 7 days. Episode frequency: 1 episode, 3 days ago. The problem has been resolved. Associated symptoms include chest pain (pressure, resolved), fatigue and nausea. Pertinent negatives include no abdominal pain, anorexia, arthralgias, change in bowel habit, chills, congestion, coughing, diaphoresis, fever, headaches, joint swelling, myalgias, neck pain, numbness, rash, sore throat, swollen glands, urinary symptoms, vertigo, visual change, vomiting or weakness. Nothing aggravates the symptoms. She has tried rest, position changes and relaxation for the symptoms. The treatment provided significant relief. Medications    Current Outpatient Medications:     diazePAM (VALIUM) 5 MG tablet, Take 1 tablet by mouth nightly as needed for Anxiety for up to 30 days. , Disp: 30 tablet, Rfl: 0    b complex vitamins capsule, Take 1 capsule by mouth daily, Disp: , Rfl:     Probiotic Product (PROBIOTIC DAILY PO), Take

## 2020-03-10 ENCOUNTER — HOSPITAL ENCOUNTER (OUTPATIENT)
Dept: NON INVASIVE DIAGNOSTICS | Age: 70
Discharge: HOME | End: 2020-03-10
Payer: MEDICARE

## 2020-03-10 LAB
LV EF: 55 %
LVEF MODALITY: NORMAL

## 2020-03-10 PROCEDURE — 93226 XTRNL ECG REC<48 HR SCAN A/R: CPT

## 2020-03-10 PROCEDURE — 93225 XTRNL ECG REC<48 HRS REC: CPT

## 2020-03-10 PROCEDURE — 93306 TTE W/DOPPLER COMPLETE: CPT

## 2020-03-13 ENCOUNTER — OFFICE VISIT (OUTPATIENT)
Dept: CARDIOLOGY CLINIC | Age: 70
End: 2020-03-13
Payer: MEDICARE

## 2020-03-13 VITALS
WEIGHT: 142.8 LBS | HEART RATE: 96 BPM | DIASTOLIC BLOOD PRESSURE: 76 MMHG | HEIGHT: 65 IN | BODY MASS INDEX: 23.79 KG/M2 | SYSTOLIC BLOOD PRESSURE: 138 MMHG

## 2020-03-13 PROCEDURE — 99204 OFFICE O/P NEW MOD 45 MIN: CPT | Performed by: NUCLEAR MEDICINE

## 2020-03-13 ASSESSMENT — ENCOUNTER SYMPTOMS
RECTAL PAIN: 0
BACK PAIN: 0
CONSTIPATION: 0
BLOOD IN STOOL: 0
VOMITING: 0
NAUSEA: 0
ANAL BLEEDING: 0
ABDOMINAL PAIN: 0
SHORTNESS OF BREATH: 1
PHOTOPHOBIA: 0
DIARRHEA: 0
ABDOMINAL DISTENTION: 0
CHEST TIGHTNESS: 0

## 2020-03-13 NOTE — PROGRESS NOTES
Grandmother     Parkinsonism Maternal Grandfather     Heart Attack Paternal Grandmother     High Blood Pressure Sister     High Cholesterol Sister     Diabetes Brother     Coronary Art Dis Brother     Stroke Brother     No Known Problems Brother     No Known Problems Brother     No Known Problems Sister      Social History     Tobacco Use    Smoking status: Former Smoker     Packs/day: 0.50     Years: 30.00     Pack years: 15.00     Types: Cigarettes     Last attempt to quit: 10/21/2017     Years since quittin.3    Smokeless tobacco: Never Used   Substance Use Topics    Alcohol use: No      Current Outpatient Medications   Medication Sig Dispense Refill    diazePAM (VALIUM) 5 MG tablet Take 1 tablet by mouth nightly as needed for Anxiety for up to 30 days. 30 tablet 0    b complex vitamins capsule Take 1 capsule by mouth daily      tobramycin-dexamethasone (TOBRADEX) 0.3-0.1 % ophthalmic ointment Apply to both eyelids BID      Probiotic Product (PROBIOTIC DAILY PO) Take by mouth      ezetimibe (ZETIA) 10 MG tablet Take 1 tablet by mouth daily 90 tablet 3    PROAIR  (90 Base) MCG/ACT inhaler Inhale 2 puffs into the lungs every 4 hours as needed for Wheezing 1 Inhaler 2    Acetaminophen (TYLENOL PO) Take by mouth as needed      Calcium-Magnesium (CALCIUM MAGNESIUM 750) 300-300 MG TABS Take by mouth daily       aspirin 81 MG EC tablet Take 81 mg by mouth daily      Multiple Vitamins-Minerals (MULTIVITAMIN PO) Take  by mouth daily. No current facility-administered medications for this visit.       Allergies   Allergen Reactions    Prednisone Swelling     Tongue swelling    Sulfa Antibiotics      Flank pain     Health Maintenance   Topic Date Due    DTaP/Tdap/Td vaccine (1 - Tdap) 1969    Shingles Vaccine (1 of 2) 2000    Pneumococcal 65+ years Vaccine (1 of 1 - PPSV23) 2015    Breast cancer screen  2018    Annual Wellness Visit (AWV)  2019    Colon cancer screen colonoscopy  02/15/2020    Flu vaccine (1) 12/10/2020 (Originally 9/1/2019)    Lipid screen  05/18/2024    Hepatitis C screen  Completed    DEXA (modify frequency per FRAX score)  Addressed    Hepatitis A vaccine  Aged Out    Hepatitis B vaccine  Aged Out    Hib vaccine  Aged Out    Meningococcal (ACWY) vaccine  Aged Out       Subjective:  Review of Systems   Constitutional: Positive for fatigue. Negative for chills and diaphoresis. HENT: Negative for ear pain and postnasal drip. Eyes: Negative for photophobia. Respiratory: Positive for shortness of breath. Negative for chest tightness. Cardiovascular: Positive for palpitations. Negative for chest pain. Gastrointestinal: Negative for abdominal distention, abdominal pain, anal bleeding, blood in stool, constipation, diarrhea, nausea, rectal pain and vomiting. Endocrine: Negative for polyphagia. Genitourinary: Negative for dysuria, frequency and pelvic pain. Musculoskeletal: Negative for arthralgias, back pain, gait problem, joint swelling, myalgias, neck pain and neck stiffness. Allergic/Immunologic: Negative for food allergies. Neurological: Negative for syncope and light-headedness. Hematological: Negative for adenopathy. Psychiatric/Behavioral: Negative for behavioral problems, confusion, decreased concentration, dysphoric mood, hallucinations and self-injury. The patient is not nervous/anxious and is not hyperactive. Objective:  Physical Exam  HENT:      Head: Normocephalic. Mouth/Throat:      Mouth: Mucous membranes are moist.   Eyes:      Extraocular Movements: Extraocular movements intact. Pupils: Pupils are equal, round, and reactive to light. Cardiovascular:      Rate and Rhythm: Normal rate and regular rhythm. Pulses: Normal pulses. Heart sounds: Murmur present. No friction rub. No gallop. Pulmonary:      Effort: No respiratory distress. Breath sounds: No stridor.  No wheezing, rhonchi or rales. Chest:      Chest wall: No tenderness. Abdominal:      General: There is no distension. Palpations: Abdomen is soft. There is no mass. Tenderness: There is no abdominal tenderness. There is no right CVA tenderness, left CVA tenderness, guarding or rebound. Hernia: No hernia is present. Musculoskeletal:         General: No swelling, tenderness, deformity or signs of injury. Right lower leg: No edema. Left lower leg: No edema. Skin:     Coloration: Skin is not jaundiced or pale. Findings: No bruising, erythema, lesion or rash. Neurological:      General: No focal deficit present. Cranial Nerves: No cranial nerve deficit. Sensory: No sensory deficit. Motor: No weakness. Coordination: Coordination normal.      Gait: Gait normal.      Deep Tendon Reflexes: Reflexes normal.   Psychiatric:         Mood and Affect: Mood normal.         Thought Content: Thought content normal.         Judgment: Judgment normal.       /76   Pulse 96   Ht 5' 5\" (1.651 m)   Wt 142 lb 12.8 oz (64.8 kg)   LMP  (Approximate)   BMI 23.76 kg/m²     Assessment:      Diagnosis Orders   1. Familial hypercholesterolemia     2. Dyspnea on exertion     3. Abnormal ECG     4. Smoking     does have some risk for CAD  Does have LBBB  Normal ECG   Smoking: discussed with the patient the importance of smoke cessation especially with the risk of CAD. ECG reviewed     Plan:  No follow-ups on file. Discussed  Cut down caffeine   Pending the holter results  Stress test   sudhakar due to LBBB  Continue risk factor modification and medical management  Thank you for allowing me to participate in the care of your patient. Please don't hesitate to contact me regarding any further issues related to the patient care    Orders Placed:  No orders of the defined types were placed in this encounter.       Medications Prescribed:  No orders of the defined types were placed in this

## 2020-03-17 ENCOUNTER — TELEPHONE (OUTPATIENT)
Dept: CARDIOLOGY CLINIC | Age: 70
End: 2020-03-17

## 2020-03-17 NOTE — TELEPHONE ENCOUNTER
Nallely is requesting a peer to peer  Annette Stress  #[phone]  Ref# [id number]    *must be Dr, NP, or PA to complete peer to peer    Please advise

## 2020-03-26 ENCOUNTER — TELEPHONE (OUTPATIENT)
Dept: FAMILY MEDICINE CLINIC | Age: 70
End: 2020-03-26

## 2020-04-06 RX ORDER — TRAMADOL HYDROCHLORIDE 50 MG/1
50 TABLET ORAL EVERY 6 HOURS PRN
Qty: 20 TABLET | Refills: 0 | OUTPATIENT
Start: 2020-04-06 | End: 2020-04-16

## 2020-04-06 RX ORDER — DIAZEPAM 5 MG/1
5 TABLET ORAL NIGHTLY PRN
Qty: 30 TABLET | Refills: 0 | Status: SHIPPED | OUTPATIENT
Start: 2020-04-06 | End: 2020-06-10 | Stop reason: SDUPTHER

## 2020-04-06 NOTE — TELEPHONE ENCOUNTER
OK for prescription for valium. However, I have never prescribed her ultram.  Is she having pain? It does not look like she has had that in the past 2 years. Thanks. -WS    Orders Placed This Encounter   Medications    diazePAM (VALIUM) 5 MG tablet     Sig: Take 1 tablet by mouth nightly as needed for Anxiety for up to 30 days. Dispense:  30 tablet     Refill:  0     Controlled Substance Monitoring:    Acute and Chronic Pain Monitoring:   RX Monitoring 4/6/2020   Attestation -   Periodic Controlled Substance Monitoring No signs of potential drug abuse or diversion identified.;Obtaining appropriate analgesic effect of treatment.

## 2020-06-02 NOTE — TELEPHONE ENCOUNTER
Left several messages for the patient to call us to get this stress test rescheduled. She is not returning our calls.

## 2020-06-03 ENCOUNTER — TELEPHONE (OUTPATIENT)
Dept: CARDIOLOGY CLINIC | Age: 70
End: 2020-06-03

## 2020-06-10 RX ORDER — DIAZEPAM 5 MG/1
5 TABLET ORAL NIGHTLY PRN
Qty: 30 TABLET | Refills: 0 | Status: SHIPPED | OUTPATIENT
Start: 2020-06-10 | End: 2020-07-10

## 2020-07-21 ENCOUNTER — TELEPHONE (OUTPATIENT)
Dept: FAMILY MEDICINE CLINIC | Age: 70
End: 2020-07-21

## 2020-07-21 NOTE — TELEPHONE ENCOUNTER
Pt would like to be seen for a sore throat. Pt states she can see white puss pockets at back of throat and has been sore for about a week. Please advise.

## 2020-07-22 ENCOUNTER — VIRTUAL VISIT (OUTPATIENT)
Dept: FAMILY MEDICINE CLINIC | Age: 70
End: 2020-07-22
Payer: MEDICARE

## 2020-07-22 PROCEDURE — 99214 OFFICE O/P EST MOD 30 MIN: CPT | Performed by: NURSE PRACTITIONER

## 2020-07-22 RX ORDER — AMOXICILLIN 500 MG/1
500 CAPSULE ORAL 3 TIMES DAILY
Qty: 30 CAPSULE | Refills: 0 | Status: SHIPPED | OUTPATIENT
Start: 2020-07-22 | End: 2020-08-01

## 2020-07-22 RX ORDER — DIAZEPAM 5 MG/1
5 TABLET ORAL EVERY 8 HOURS PRN
Qty: 30 TABLET | Refills: 0 | Status: SHIPPED | OUTPATIENT
Start: 2020-07-22 | End: 2020-09-14 | Stop reason: SDUPTHER

## 2020-07-22 ASSESSMENT — ENCOUNTER SYMPTOMS
SHORTNESS OF BREATH: 0
RHINORRHEA: 0
TROUBLE SWALLOWING: 0
SORE THROAT: 1
COUGH: 0
COLOR CHANGE: 0

## 2020-07-22 NOTE — PROGRESS NOTES
2020    TELEHEALTH EVALUATION -- Audio/Visual (During YZZQV-24 public health emergency)    HPI:    Esau Banks (:  1950) has requested an audio/video evaluation for the following concern(s):    Pt presents via video chat/phone call for sore throat. Pt reports that she can see white patches in the back of her throat, She also has some pain with swallowing. No fever or chills. Started 1 week ago. She is eating and drinking OK. Denies any cough, congestion or nasal drainage. Needs refill of Valium. Doing well on 5 mg at night. Helps her sleep. Denies any side effects. Review of Systems   Constitutional: Negative for chills, fatigue and fever. HENT: Positive for sore throat. Negative for congestion, ear pain, nosebleeds, postnasal drip, rhinorrhea and trouble swallowing. Respiratory: Negative for cough and shortness of breath. Skin: Negative for color change and rash. Neurological: Negative for dizziness and headaches. Prior to Visit Medications    Medication Sig Taking? Authorizing Provider   amoxicillin (AMOXIL) 500 MG capsule Take 1 capsule by mouth 3 times daily for 10 days Yes MARVEL White CNP   diazePAM (VALIUM) 5 MG tablet Take 1 tablet by mouth every 8 hours as needed for Anxiety or Sleep for up to 30 days.  Yes MARVEL White CNP   b complex vitamins capsule Take 1 capsule by mouth daily  Historical Provider, MD   tobramycin-dexamethasone (TOBRADEX) 0.3-0.1 % ophthalmic ointment Apply to both eyelids BID  Historical Provider, MD   Probiotic Product (PROBIOTIC DAILY PO) Take by mouth  Historical Provider, MD   ezetimibe (ZETIA) 10 MG tablet Take 1 tablet by mouth daily  MARVEL White CNP   PROAIR  (90 Base) MCG/ACT inhaler Inhale 2 puffs into the lungs every 4 hours as needed for Wheezing  MARVEL White CNP   Acetaminophen (TYLENOL PO) Take by mouth as needed  Historical Provider, MD   Calcium-Magnesium (CALCIUM MAGNESIUM 750) 300-300 MG TABS Take by mouth daily   Historical Provider, MD   aspirin 81 MG EC tablet Take 81 mg by mouth daily  Historical Provider, MD   Multiple Vitamins-Minerals (MULTIVITAMIN PO) Take  by mouth daily. Historical Provider, MD       Social History     Tobacco Use    Smoking status: Former Smoker     Packs/day: 0.50     Years: 30.00     Pack years: 15.00     Types: Cigarettes     Last attempt to quit: 10/21/2017     Years since quittin.7    Smokeless tobacco: Never Used   Substance Use Topics    Alcohol use: No    Drug use: No        Allergies   Allergen Reactions    Prednisone Swelling     Tongue swelling    Sulfa Antibiotics      Flank pain   ,   Past Medical History:   Diagnosis Date    Anxiety     Hyperlipidemia     Kidney stones     Osteoarthritis     Restless leg syndrome     Tobacco abuse     Viral hepatitis A    ,   Past Surgical History:   Procedure Laterality Date    CHOLECYSTECTOMY      COLONOSCOPY      ERCP      HYSTERECTOMY      Right ovary intact    OVARIAN CYST REMOVAL Right 1985    POLYPECTOMY  2016    ROTATOR CUFF REPAIR Left 2017    Dr. Monroe Check  Age 5    UPPER GASTROINTESTINAL ENDOSCOPY         PHYSICAL EXAMINATION:  [ INSTRUCTIONS:  \"[x]\" Indicates a positive item  \"[]\" Indicates a negative item  -- DELETE ALL ITEMS NOT EXAMINED]  Vital Signs: (As obtained by patient/caregiver or practitioner observation)    Constitutional: [x] Appears well-developed and well-nourished [x] No apparent distress      [] Abnormal-   Mental status  [x] Alert and awake  [x] Oriented to person/place/time [x]Able to follow commands      Eyes:  EOM    [x]  Normal  [] Abnormal-  Sclera  [x]  Normal  [] Abnormal -         Discharge [x]  None visible  [] Abnormal -    HENT:   [x] Normocephalic, atraumatic.   [] Abnormal   [x] Mouth/Throat: Mucous membranes are moist.     External Ears [x] Normal  [] Abnormal-     Neck: [x] No visualized mass     Pulmonary/Chest: [x] Respiratory effort normal.  [x] No visualized signs of difficulty breathing or respiratory distress        [] Abnormal-      Musculoskeletal:   [x] Normal gait with no signs of ataxia         [x] Normal range of motion of neck        [] Abnormal-       Neurological:        [x] No Facial Asymmetry (Cranial nerve 7 motor function) (limited exam to video visit)          [] No gaze palsy        [] Abnormal-         Skin:        [x] No significant exanthematous lesions or discoloration noted on facial skin         [] Abnormal-            Psychiatric:       [x] Normal Affect [x] No Hallucinations        [] Abnormal-     Other pertinent observable physical exam findings-     ASSESSMENT/PLAN:  1. Acute pharyngitis, unspecified etiology  - amoxicillin (AMOXIL) 500 MG capsule; Take 1 capsule by mouth 3 times daily for 10 days  Dispense: 30 capsule; Refill: 0    2. Anxiety  - diazePAM (VALIUM) 5 MG tablet; Take 1 tablet by mouth every 8 hours as needed for Anxiety or Sleep for up to 30 days. Dispense: 30 tablet; Refill: 0      - Prescription sent in for amoxil, take as directed and call if not improving  - OK for tylenol as needed for pain  - Increase clear fluids  - Refill on Valium today    Controlled Substance Monitoring:    Acute and Chronic Pain Monitoring:   RX Monitoring 7/22/2020   Attestation -   Periodic Controlled Substance Monitoring No signs of potential drug abuse or diversion identified.;Obtaining appropriate analgesic effect of treatment. Return if symptoms worsen or fail to improve. Eladio Ely is a 71 y.o. female being evaluated by a Virtual Visit (video visit) encounter to address concerns as mentioned above. A caregiver was present when appropriate.  Due to this being a TeleHealth encounter (During [place] [address] public health emergency), evaluation of the following organ systems was limited:

## 2020-07-28 ENCOUNTER — TELEPHONE (OUTPATIENT)
Dept: FAMILY MEDICINE CLINIC | Age: 70
End: 2020-07-28

## 2020-07-30 LAB — SARS-COV-2: NOT DETECTED

## 2020-08-13 ENCOUNTER — TELEPHONE (OUTPATIENT)
Dept: ENT CLINIC | Age: 70
End: 2020-08-13

## 2020-08-13 NOTE — TELEPHONE ENCOUNTER
Patient called the office requesting medical records from [facility]. Patient stated she was seen by Dr. Juan Carlos Norris. Patient was provided with medical records phone number.

## 2020-08-14 ENCOUNTER — TELEPHONE (OUTPATIENT)
Dept: FAMILY MEDICINE CLINIC | Age: 70
End: 2020-08-14

## 2020-08-14 ENCOUNTER — NURSE TRIAGE (OUTPATIENT)
Dept: OTHER | Facility: CLINIC | Age: 70
End: 2020-08-14

## 2020-08-14 NOTE — TELEPHONE ENCOUNTER
Reason for Disposition   Earache    Answer Assessment - Initial Assessment Questions  1. LOCATION: \"Where does it hurt? \"       Chest congestion  2. ONSET: \"When did the sinus pain start? \"  (e.g., hours, days)    off and on for quite some time  3. SEVERITY: \"How bad is the pain? \"   (Scale 1-10; mild, moderate or severe)    - MILD (1-3): doesn't interfere with normal activities     - MODERATE (4-7): interferes with normal activities (e.g., work or school) or awakens from sleep    - SEVERE (8-10): excruciating pain and patient unable to do any normal activities       4. RECURRENT SYMPTOM: \"Have you ever had sinus problems before? \" If so, ask: \"When was the last time? \" and \"What happened that time?\"     5. NASAL CONGESTION: \"Is the nose blocked? \" If so, ask, \"Can you open it or must you breathe through the mouth? \"    6. NASAL DISCHARGE: \"Do you have discharge from your nose? \" If so ask, \"What color? \"    Clear congestion when coughs it up  7. FEVER: \"Do you have a fever? \" If so, ask: \"What is it, how was it measured, and when did it start? \"      No fever  8. OTHER SYMPTOMS: \"Do you have any other symptoms? \" (e.g., sore throat, cough, earache, difficulty breathing)     Right ear feels clogged  9. PREGNANCY: \"Is there any chance you are pregnant? \" \"When was your last menstrual period? \"    Protocols used: SINUS PAIN AND CONGESTION-ADULT-OH  Received call from [facility]. Pt calling with chest congestion that has been ongoing. Slight cough with clear mucous. No breathing concerns. No fever. Also, right ear pain at times. Recommend pt is seen today, call sooner if worsen. Call soft transferred to [address] to schedule appointment. Please do not reply to the triage nurse through this encounter. Any subsequent communication should be directly with the patient.

## 2020-08-14 NOTE — TELEPHONE ENCOUNTER
Patient transferred from nurse triage for possible appointment. Patient said she has has chest congestion and a slight cough off/on for a while now, she thinks she needs referred to a pulmonologist. She said she does not want to do a vv, she would like to come in for an appt if possible. Please call her to advise.

## 2020-08-14 NOTE — TELEPHONE ENCOUNTER
I have reviewed her more recent visits and she has been seen for sore throat and ENT type issues, but no cough and congestion that I can see. I would need to see her and order some testing prior to being referred to pulmonary, per pulmonary office protocol. OK for a VV if pt can not come to the office next week.   Thanks -WS

## 2020-08-14 NOTE — TELEPHONE ENCOUNTER
Called pt, read instructions to her  She states \"Can you just refer to pulmonology, she know I have been having this same issue? \"

## 2020-08-17 ENCOUNTER — OFFICE VISIT (OUTPATIENT)
Dept: FAMILY MEDICINE CLINIC | Age: 70
End: 2020-08-17
Payer: MEDICARE

## 2020-08-17 ENCOUNTER — HOSPITAL ENCOUNTER (OUTPATIENT)
Dept: GENERAL RADIOLOGY | Age: 70
Discharge: HOME | End: 2020-08-17
Payer: MEDICARE

## 2020-08-17 ENCOUNTER — HOSPITAL ENCOUNTER (OUTPATIENT)
Age: 70
Discharge: HOME | End: 2020-08-17
Payer: MEDICARE

## 2020-08-17 VITALS
WEIGHT: 145.2 LBS | HEART RATE: 76 BPM | RESPIRATION RATE: 16 BRPM | DIASTOLIC BLOOD PRESSURE: 68 MMHG | BODY MASS INDEX: 24.16 KG/M2 | TEMPERATURE: 98.1 F | SYSTOLIC BLOOD PRESSURE: 142 MMHG

## 2020-08-17 PROCEDURE — 99214 OFFICE O/P EST MOD 30 MIN: CPT | Performed by: NURSE PRACTITIONER

## 2020-08-17 PROCEDURE — 71046 X-RAY EXAM CHEST 2 VIEWS: CPT

## 2020-08-17 ASSESSMENT — ENCOUNTER SYMPTOMS
COUGH: 0
RHINORRHEA: 0
SORE THROAT: 1
CONSTIPATION: 0
VOMITING: 0
SINUS PRESSURE: 0
PHOTOPHOBIA: 0
BACK PAIN: 0
SINUS PAIN: 0
SHORTNESS OF BREATH: 0
DIARRHEA: 0

## 2020-08-17 NOTE — PROGRESS NOTES
[address] [facility] [address]  Dept: [phone]  Dept Fax: [phone]  Loc: [phone]    2020     Ute Juan (:  1950) is a 71 y.o. female, here for evaluation of the following medical concerns:    Chief Complaint   Patient presents with    Other     discuss pulm referral. cough and congestion         Complaining of congestion that has been going on for 6 months. It got better when she stopped smoking. She started smoking again 5 months ago, but stopped 2 months agoPatient has been using her Proair with no improvement. She has tried Robitussin and Mucinex with no improvement. She has stated that her right ear has drained on an off for the last couple months. She stated that she has allergies and used to take Allegra, but has not been taking it. She now is having issues swallowing and feels like food or something is stuck in her throat. She stated that her throat is sore from clearing her throat all the time. Patient has been clearing her throat during conversation. She states that she has never been diagnosed with GERD. She says it is not worse at night or when she lays down. Pt is here for depression. Pt currently taking  Valium  Side effects noted: none    Sleep-OK  Interest- OK  Guilt- OK  Energy- OK  Concentration- OK  Appetite- OK      Anxiety-OK      Employer? [employer] work days? -  Retired in March      Wt Readings from Last 3 Encounters:   20 145 lb 3.2 oz (65.9 kg)   20 142 lb 12.8 oz (64.8 kg)   20 145 lb 12.8 oz (66.1 kg)         Review of Systems   Constitutional: Negative for chills, fatigue and fever. HENT: Positive for congestion, ear pain and sore throat. Negative for rhinorrhea, sinus pressure and sinus pain. Eyes: Negative for photophobia. Respiratory: Negative for cough and shortness of breath.     Cardiovascular: Negative for chest pain and leg swelling. Gastrointestinal: Negative for constipation, diarrhea and vomiting. Endocrine: Negative for cold intolerance. Genitourinary: Negative for dysuria, frequency, hematuria and urgency. Musculoskeletal: Negative for arthralgias and back pain. Allergic/Immunologic: Positive for environmental allergies. Neurological: Negative for dizziness, weakness, light-headedness, numbness and headaches. Psychiatric/Behavioral: Negative for behavioral problems and sleep disturbance. The patient is not nervous/anxious. Prior to Visit Medications    Medication Sig Taking? Authorizing Provider   diazePAM (VALIUM) 5 MG tablet Take 1 tablet by mouth every 8 hours as needed for Anxiety or Sleep for up to 30 days. Yes MARVEL Gauthier CNP   b complex vitamins capsule Take 1 capsule by mouth daily Yes Historical Provider, MD   Probiotic Product (PROBIOTIC DAILY PO) Take by mouth Yes Historical Provider, MD   ezetimibe (ZETIA) 10 MG tablet Take 1 tablet by mouth daily Yes MARVEL Gauthier CNP   Acetaminophen (TYLENOL PO) Take by mouth as needed Yes Historical Provider, MD   Calcium-Magnesium (CALCIUM MAGNESIUM 750) 300-300 MG TABS Take by mouth daily  Yes Historical Provider, MD   aspirin 81 MG EC tablet Take 81 mg by mouth daily Yes Historical Provider, MD   Multiple Vitamins-Minerals (MULTIVITAMIN PO) Take  by mouth daily.  Yes Historical Provider, MD   PROAIR  (29 Base) MCG/ACT inhaler Inhale 2 puffs into the lungs every 4 hours as needed for Wheezing  Patient not taking: Reported on 2020  MARVEL Gauthier CNP        Social History     Tobacco Use    Smoking status: Former Smoker     Packs/day: 0.50     Years: 30.00     Pack years: 15.00     Types: Cigarettes     Last attempt to quit: 10/21/2017     Years since quittin.8    Smokeless tobacco: Never Used   Substance Use Topics    Alcohol use: No        Vitals:    20 1304   BP: (!) 142/68   Pulse: 76   Resp: 16   Temp: 98.1 °F (36.7 °C)   Weight: 145 lb 3.2 oz (65.9 kg)     Estimated body mass index is 24.16 kg/m² as calculated from the following:    Height as of 3/13/20: 5' 5\" (1.651 m). Weight as of this encounter: 145 lb 3.2 oz (65.9 kg). Physical Exam  Constitutional:       Appearance: Normal appearance. She is not ill-appearing. HENT:      Right Ear: Tympanic membrane, ear canal and external ear normal.      Left Ear: Tympanic membrane, ear canal and external ear normal.      Nose: Congestion present. No rhinorrhea. Mouth/Throat:      Mouth: Mucous membranes are moist.      Pharynx: Posterior oropharyngeal erythema present. No oropharyngeal exudate. Eyes:      General:         Right eye: No discharge. Left eye: No discharge. Pupils: Pupils are equal, round, and reactive to light. Neck:      Musculoskeletal: Normal range of motion. No neck rigidity or muscular tenderness. Vascular: No carotid bruit. Cardiovascular:      Rate and Rhythm: Normal rate and regular rhythm. Pulses: Normal pulses. Heart sounds: Normal heart sounds. Pulmonary:      Effort: Pulmonary effort is normal.      Breath sounds: Examination of the right-lower field reveals wheezing. Examination of the left-lower field reveals wheezing. Wheezing (mild lower, and anterior chest) present. Abdominal:      General: Abdomen is flat. Tenderness: There is no abdominal tenderness. There is no right CVA tenderness or left CVA tenderness. Musculoskeletal: Normal range of motion. Right lower leg: No edema. Left lower leg: No edema. Lymphadenopathy:      Cervical: No cervical adenopathy. Skin:     General: Skin is warm. Findings: No rash. Neurological:      General: No focal deficit present. Mental Status: She is alert and oriented to person, place, and time. Motor: No weakness.    Psychiatric:         Mood and Affect: Mood normal.         Behavior: Behavior normal.         Thought Content: Thought content normal.         Judgment: Judgment normal.         ASSESSMENT/PLAN:  1. Hoarse voice quality  - [address] Khushboo Glez MD, Otolaryngology, [address], MD, Pulmonary Disease, [place]    2. Tobacco abuse  - Continue smoking cessation  - Wyatt Dong MD, Pulmonary Disease, [place]    3. Anxiety  - Continue Valium    4. Dysphagia, unspecified type  - Ricardo Burciaga MD, Otolaryngology, [place]    5. Expiratory wheezing  - XR CHEST (2 VW); Future  - Full PFT Study With Bronchodilator; Future  - Wyatt Dong MD, Pulmonary Disease, [place]    6. Encounter for laboratory testing for COVID-19 virus  - Need completed 7 days prior PFT's  - COVID-19 Ambulatory; Future      -Return in about 3 months (around 11/17/2020), or if symptoms worsen or fail to improve.  -Call office with any questions or concerns, or if symptoms are getting worse or changing  -Patient given educational materials - see patient instructions. Discussed use, benefit, and side effects of prescribed medications. All patient questions answered. Pt voiced understanding. Reviewed Health Maintenance. An electronic signature was used to authenticate this note.     --MARVEL Weiss - CNP on 8/17/2020 at 3:42 PM

## 2020-08-17 NOTE — PATIENT INSTRUCTIONS
Patient Education        Learning About Swallowing Problems  What are swallowing problems? Certain health problems that affect the nervous system can cause trouble swallowing. These conditions include stroke, ALS (also known as Jackie Gehrig's disease), Parkinson's disease, and multiple sclerosis. The muscles and nerves that help move food through the throat and esophagus may not work right. Growths, such as cancer, and other problems with your esophagus can also make it hard to swallow. The esophagus is the tube that leads from your throat to your stomach. How are swallowing problems diagnosed? A doctor or speech therapist will examine you to check for swallowing problems. You may get swallowing tests to check how well your throat muscles work. For these tests, you swallow a special liquid that helps the doctor see your throat and esophagus on an X-ray or video screen. Other tests use a thin, flexible tube called a scope to check for problems with your esophagus. The doctor puts the scope in your mouth and down your throat to look at your esophagus. What are the symptoms? Symptoms of swallowing problems may include:  · Trouble getting food or liquids to go down on the first try. · Gagging, choking, or coughing when you swallow. · Having food or liquids come back up through your throat, mouth, or nose after you swallow. · Feeling like foods or liquids are stuck in some part of your throat or chest.  · Pain when you swallow. How are swallowing problems treated? How swallowing problems are treated depends on the cause. The main goals of treatment will be to help you eat and swallow safely and get good nutrition. This is important for your health and quality of life. You may learn exercises to train your throat muscles to work together so you're able to swallow better. Learning certain ways to put food in your mouth or to position your head while eating may also help.   Your doctor or a speech therapist may recommend changes to your diet to help make it easier to swallow. You may need to avoid certain foods or liquids. You also may need to change the thickness of foods or liquids in your diet. To eat and swallow safely, follow any instructions you get from your doctor or therapist. These ideas may help:  · Sit upright when eating, drinking, and taking pills. · Take small bites of food. Chew completely and swallow before taking another bite. · Take small sips of liquids. · If eating makes you tired, eat smaller but more frequent meals. · Tip your chin down when there is food in your mouth. Where can you learn more? Go to https://Uniipepiceweb.iZumi Bio. org and sign in to your [employer] account. Enter I329 in the Monogram box to learn more about \"Learning About Swallowing Problems. \"     If you do not have an account, please click on the \"Sign Up Now\" link. Current as of: June 26, 2019               Content Version: 12.5  © 4891-8442 [employer]. Care instructions adapted under license by [facility] [employer] [facility] ([facility]). If you have questions about a medical condition or this instruction, always ask your healthcare professional. [place] [address] any warranty or liability for your use of this information. Patient Education        Swallowing: Exercises  Your Care Instructions  Here are some examples of exercises for you to try. The exercises may be suggested for a condition or for rehabilitation. Start each exercise slowly. Ease off the exercises if you start to have pain. If you need to swallow for an exercise, use saliva, not food or drinks. You will be told when you can start these exercises and which ones will work best for you. Only do the exercises that your doctor or speech therapist tells you to do. Follow their instructions carefully. The exercises will help train your muscles and nerves to swallow so your food doesn't go down the wrong way.  They can also help you speak better. Your doctor or speech therapist will tell you how often to do the exercises and how many times you should repeat each one. Shaker exercise     There are two ways to do this exercise. Your doctor or speech therapist may have you do one or both. 1. Lie down on your back on the floor or a firm mattress. 2. Keep your shoulders flat. Don't lift your shoulders. 3. Bend your head forward so that your chin tucks. Try to see your toes. 4. Hold the position as your doctor or speech therapist directs. You can either:  ? Hold this position for 1 minute, and then lower your head and rest for 1 minute. ? Or hold this position for 1 minute, and then lower your head and go on to the next repetition. 5. Repeat these steps as many times as directed. Supraglottic exercise  1. Sit or stand comfortably. 2. Take a deep breath and hold it. 3. Swallow while holding your breath. 4. Cough gently. 5. Repeat these steps as many times as directed. Super supraglottic exercise  1. Sit or stand comfortably. 2. Take a deep breath, and hold it while \"bearing down\" (like having a bowel movement). 3. Swallow while you hold your breath. 4. Cough gently. 5. Repeat these steps as many times as directed. Shelli maneuver     1. Sit or stand comfortably. 2. Stick your tongue out as far as is comfortable. 3. Hold your tongue gently between your front teeth, and then swallow. 4. Let go of your tongue. 5. Repeat these steps as many times as directed. Mendelsohn maneuver     1. Sit or stand comfortably. 2. Start to swallow normally. 3. When your Jg's apple is at its highest point, squeeze your throat muscles to hold it in that position for 3 counts, and then relax. You can use your fingers to help you hold your Jg's apple at its highest point. 4. Repeat these steps as many times as directed. Effortful swallow exercise  1. Sit or stand comfortably.   2. Squeeze your throat muscles as hard as you can, and then swallow. Imagine that you have a grape-sized piece of food stuck at the back of your throat, and try to swallow it. 3. Repeat these steps as many times as directed. Follow-up care is a key part of your treatment and safety. Be sure to make and go to all appointments, and call your doctor if you are having problems. It's also a good idea to know your test results and keep a list of the medicines you take. Where can you learn more? Go to https://Peonut.Databricks. org and sign in to your [employer] account. Enter A009 in the Picturelife box to learn more about \"Swallowing: Exercises. \"     If you do not have an account, please click on the \"Sign Up Now\" link. Current as of: July 29, 2019               Content Version: 12.5  © 0945-2379 [employer]. Care instructions adapted under license by [facility] ([facility]). If you have questions about a medical condition or this instruction, always ask your healthcare professional. [address] any warranty or liability for your use of this information.

## 2020-08-18 ENCOUNTER — HOSPITAL ENCOUNTER (OUTPATIENT)
Age: 70
Discharge: HOME | End: 2020-08-18
Payer: MEDICARE

## 2020-08-18 DIAGNOSIS — Z20.822 ENCOUNTER FOR LABORATORY TESTING FOR COVID-19 VIRUS: ICD-10-CM

## 2020-08-18 PROCEDURE — 99211 OFF/OP EST MAY X REQ PHY/QHP: CPT

## 2020-08-18 PROCEDURE — U0003 INFECTIOUS AGENT DETECTION BY NUCLEIC ACID (DNA OR RNA); SEVERE ACUTE RESPIRATORY SYNDROME CORONAVIRUS 2 (SARS-COV-2) (CORONAVIRUS DISEASE [COVID-19]), AMPLIFIED PROBE TECHNIQUE, MAKING USE OF HIGH THROUGHPUT TECHNOLOGIES AS DESCRIBED BY CMS-2020-01-R: HCPCS

## 2020-08-20 LAB — SARS-COV-2: NOT DETECTED

## 2020-08-21 ENCOUNTER — TELEPHONE (OUTPATIENT)
Dept: FAMILY MEDICINE CLINIC | Age: 70
End: 2020-08-21

## 2020-08-21 NOTE — TELEPHONE ENCOUNTER
Spoke with Terry Rodríguez at therapy dept. She will contact patient to let her know this. If patient still has questions, she will have her call our office back next week.

## 2020-08-25 ENCOUNTER — HOSPITAL ENCOUNTER (OUTPATIENT)
Dept: PULMONOLOGY | Age: 70
Discharge: HOME | End: 2020-08-25
Payer: MEDICARE

## 2020-08-25 PROCEDURE — 94729 DIFFUSING CAPACITY: CPT

## 2020-08-25 PROCEDURE — 94060 EVALUATION OF WHEEZING: CPT

## 2020-08-25 PROCEDURE — 94726 PLETHYSMOGRAPHY LUNG VOLUMES: CPT

## 2020-08-25 NOTE — PROGRESS NOTES
Prescreening performed prior to testing. The following symptons may indicate COVID-19 infection:        One of the following criteria:   Temperature taken, patient temperature was 97.3 F. Fever greater 100.0 F -no  Cough -  no  New onset shortness of breath -no  New onset difficulty breathing -no        And/or   Two or more of the following criteria:  Muscle aches -no  Headache -no  Sore throat -no  New onset loss of smell/taste -no  New onset diarrhea -no    Patient's screening was negative. PFT will be performed.

## 2020-08-28 ENCOUNTER — HOSPITAL ENCOUNTER (OUTPATIENT)
Dept: SPEECH THERAPY | Age: 70
Setting detail: THERAPIES SERIES
Discharge: HOME | End: 2020-08-28
Payer: MEDICARE

## 2020-08-28 PROCEDURE — 92610 EVALUATE SWALLOWING FUNCTION: CPT | Performed by: SPEECH-LANGUAGE PATHOLOGIST

## 2020-08-28 NOTE — PROGRESS NOTES
** PLEASE SIGN, DATE AND TIME CERTIFICATION BELOW AND RETURN TO [facility] OUTPATIENT REHABILITATION (FAX #: [phone]). ATTEST/CO-SIGN IF ACCESSING VIA [employer]. THANK YOU.**    I certify that I have examined the patient below and determined that Physical Medicine and Rehabilitation service is necessary and that I approve the established plan of care for up to 90 days or as specifically noted. Attestation, signature or co-signature of physician indicates approval of certification requirements.    ________________________ ____________ __________  Physician Signature   Date   Time  [facility]  Bedside Swallowing Evaluation    Date: 2020  Patient Name: Farshad Ugalde    CSN: [id number]   : 1950  (71 y.o.)  Gender: female   Referring Physician:  MAUREEN Herman  Diagnosis: Hoarse vocal quality (R49.0); Dysphagia, unspecified type (R13.10)  Secondary Diagnosis:  Dysphagia   Insurance/Certification Information:  [employer]  Visit number / total approved visits: 1 - no visit limit, based on medicare caps  Visit count since last progress note:  1  Certification Date:              History of Present Illness/Injury: Patient reports she feels like there is always something in her throat when she swallows, including when she eats and drinks. Patient reports this has been ongoing for \"a couple of months. \" Patient denies any coughing/choking during meals. Patient denies a history of reflux, but reports she used to take medication for reflux over 10 years ago. Patient denies a history of pneumonia. Patient also presents with a hoarse vocal quality this date and reports she has seen Dr. Fide Spears in the past and he removed something from her vocal cords, but she was not sure what it was. Patient has a consultation with Dr. Vivienne Conteh on 20 for further evaluation of her voice.   Encouraged the patient to try to obtain her records from pharyngeal phase of the swallow appears within functional limits. Timely swallows and adequate laryngeal elevation noted and patient denies any feeling of residue with PO trials. No overt s/s of aspiration evident throughout this study. RECOMMENDATIONS:     Modified Barium Swallow: [] Is indicated to further assess    [x] Is NOT indicated at this time; Will recommend as        appropriate. DIET RECOMMENDATIONS:    Regular with thin liquids     STRATEGIES: [] Strategies pending MBS results. [x] Full upright position [x] Small bite/sip  [] No Straw   []Multiple Swallow  [] Chin tuck   [] Head turn   []Pulmonary monitoring [] Oral care after all meals [] Supervision    [] Medication in applesauce []Direct 1:1 Supervision [] Spoon all liquids   [] Alternate solid / liquid [] Limit distractions  [] Monitor for fatigue  [] PMV in place for all po [] OTHER:     EDUCATION:   Learner: [x]Patient [] Significant other [] Son/Daughter      [] Parent [] Other:   Education: [x] Reviewed results and recommendations of this evaluation     [x] Reviewed diet and strategies     [x] Reviewed signs, symptoms and risk of aspiration     [] Demonstrated how to thick liquid appropriately. [] Reviewed goals and Plan of Care     [x] OTHER: No dysphagia treatment needed. Will hold on voice evaluation at least until after her appointment with Dr. Antonio Tamayo, ENT on 9/2/20. Patient to call this therapist following her ENT appointment. Method: [x] Discussion [] Demonstration [] Hand-out     [] OTHER:   Evaluation of Education:     [x] Verbalizes understanding [] Demonstrates with assistance     [] Demonstrates without assistance []Needs further instruction     [] No evidence of learning  [x] Family not present    PATIENT GOALS: [] Pt did not state. Will further assess during treatment.      [] Return to the least restricted diet possible     [] Return to previous level of function     [x] OTHER: figure out what is causing her symptoms    PLAN / TREATMENT RECOMMENDATIONS:  [x] No further speech therapy services indicated for dysphagia, however, will place the patient on hold until after her appointment with Dr. Tin Yeager to determine if voice assessment/therapy is indicated. SHORT TERM GOALS:  Short-term Goals  Timeframe for Short-term Goals: 4 weeks  Goal 1: Assess voice as indicated following appointment with ENT.       TIME IN: 0945  TIME OUT: 1021  UNTIMED TREATMENT:  36  TIMED TREATMENT: 0  TOTAL TIME: 36 minutes       ANAHI Grant [address]

## 2020-09-02 ENCOUNTER — OFFICE VISIT (OUTPATIENT)
Dept: ENT CLINIC | Age: 70
End: 2020-09-02
Payer: MEDICARE

## 2020-09-02 VITALS
RESPIRATION RATE: 16 BRPM | DIASTOLIC BLOOD PRESSURE: 82 MMHG | HEIGHT: 65 IN | TEMPERATURE: 97.3 F | SYSTOLIC BLOOD PRESSURE: 124 MMHG | HEART RATE: 84 BPM | WEIGHT: 143.1 LBS | BODY MASS INDEX: 23.84 KG/M2

## 2020-09-02 PROBLEM — R49.0 HOARSENESS: Status: ACTIVE | Noted: 2020-09-02

## 2020-09-02 PROCEDURE — 99204 OFFICE O/P NEW MOD 45 MIN: CPT | Performed by: OTOLARYNGOLOGY

## 2020-09-02 NOTE — PROGRESS NOTES
it was very expensive. She describes today as an \"average day\" regarding her level of dysphonia. She states that her voice never completely goes out but it easily fatigues. 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 She remains very active for example cutting her own grass and weed whipping. She has coughs up no blood and has no nighttime coughing and choking. She has no reflux history or symptomatology. History: Allergies   Allergen Reactions    Prednisone Swelling     Tongue swelling    Sulfa Antibiotics      Flank pain     Current Outpatient Medications   Medication Sig Dispense Refill    b complex vitamins capsule Take 1 capsule by mouth daily      Probiotic Product (PROBIOTIC DAILY PO) Take by mouth      ezetimibe (ZETIA) 10 MG tablet Take 1 tablet by mouth daily 90 tablet 3    PROAIR  (90 Base) MCG/ACT inhaler Inhale 2 puffs into the lungs every 4 hours as needed for Wheezing 1 Inhaler 2    Acetaminophen (TYLENOL PO) Take by mouth as needed      Calcium-Magnesium (CALCIUM MAGNESIUM 750) 300-300 MG TABS Take by mouth daily       aspirin 81 MG EC tablet Take 81 mg by mouth daily      Multiple Vitamins-Minerals (MULTIVITAMIN PO) Take  by mouth daily. No current facility-administered medications for this visit.       Past Medical History:   Diagnosis Date    Anxiety     Hyperlipidemia     Kidney stones     Osteoarthritis     Restless leg syndrome 2010    Tobacco abuse     Viral hepatitis A 1992      Past Surgical History:   Procedure Laterality Date    CHOLECYSTECTOMY  1983    COLONOSCOPY      ERCP      HYSTERECTOMY  1981    Right ovary intact    OVARIAN CYST REMOVAL Right 1985    POLYPECTOMY  08/26/2016    ROTATOR CUFF REPAIR Left 05/19/2017    Dr. Aroldo Rush  Age 5    UPPER GASTROINTESTINAL ENDOSCOPY       Family History   Problem Relation Age of Onset    Diabetes Mother         Type 2    Kidney Disease Mother     High Cholesterol Mother     High Blood Pressure Mother     Heart Disease Mother     Cancer Father         Liver cancer    High Blood Pressure Father     Cancer Brother         Liver cancer    Heart Attack Maternal Grandmother     Heart Disease Maternal Grandmother     High Blood Pressure Maternal Grandmother     Parkinsonism Maternal Grandfather     Heart Attack Paternal Grandmother     High Blood Pressure Sister     High Cholesterol Sister     Diabetes Brother     Coronary Art Dis Brother     Stroke Brother     No Known Problems Brother     No Known Problems Brother     No Known Problems Sister      Social History     Tobacco Use    Smoking status: Former Smoker     Packs/day: 0.50     Years: 30.00     Pack years: 15.00     Types: Cigarettes     Last attempt to quit: 10/21/2017     Years since quittin.8    Smokeless tobacco: Never Used   Substance Use Topics    Alcohol use: No        Subjective:      Review of Systems  Rest of review of systems are negative, except as noted in HPI. Objective:     /82 (Site: Left Upper Arm, Position: Sitting)   Pulse 84   Temp 97.3 °F (36.3 °C) (Infrared)   Resp 16   Ht 5' 5\" (1.651 m)   Wt 143 lb 1.6 oz (64.9 kg)   BMI 23.81 kg/m²     Physical Exam     General physical exam the patient is a pleasant alert oriented and cooperative older adult female who appears approximately her stated age. Her voice is markedly abnormal and that is markedly low in pitch, moderately pressed in quality and markedly raspy for her age and gender. I heard occasional throat clearing but no coughing or inspiratory stridor. Her speech pattern is within normal limits. Her ears are abnormal for complete occlusion of the right ear canal with cerumen. The left side is partially occluded. Her nose is patent bilaterally. Her oral cavity and oropharynx are abnormal for the presence of a long soft palate and narrow oropharyngeal inlet.   She has full denture over the maxilla and extensive bridgework of the lower teeth. No other lesions were seen in the oral cavity. Her neck is free of adenopathy and thyromegaly. The area where she describes the \"lump\" was devoid of any discernible mass. On auscultation the patient's lungs were clear to auscultation with the exception of the right base which consistently had a fairly loud exhalation will wheeze. It was only audible on end expiration. Her heart had a regular rate and rhythm without murmur or gallop. Her upper extremities were abnormal for the presence of digital cyanosis. Her capillary refill was normal as was her strength. Her lower extremities are free of edema. Vitals reviewed. Xr Chest (2 Vw)    Result Date: 8/17/2020  Normal chest. No acute findings. **This report has been created using voice recognition software. It may contain minor errors which are inherent in voice recognition technology. ** Final report electronically signed by Dr. Long Madison on 8/17/2020 2:07 PM     Lab Results   Component Value Date     03/03/2020     05/18/2019     09/24/2017    K 4.4 03/03/2020    K 4.7 05/18/2019    K 4.3 09/24/2017     03/03/2020     05/18/2019     09/24/2017    CO2 22 03/03/2020    CO2 27 05/18/2019    CO2 24 09/24/2017    BUN 19 03/03/2020    BUN 16 05/18/2019    BUN 11 09/24/2017    CREATININE 0.7 03/03/2020    CREATININE 0.71 05/18/2019    CREATININE 0.85 09/24/2017    CALCIUM 9.3 03/03/2020    CALCIUM 9.7 05/18/2019    CALCIUM 9.1 09/24/2017    PROT 6.6 03/03/2020    PROT 6.3 05/18/2019    PROT 6.4 09/24/2017    LABALBU 4.3 03/03/2020    LABALBU 4.2 05/18/2019    LABALBU 4.9 09/24/2017    BILITOT 0.4 03/03/2020    BILITOT 0.8 05/18/2019    BILITOT 1.4 09/24/2017    ALKPHOS 104 03/03/2020    ALKPHOS 102 05/18/2019    ALKPHOS 95 09/24/2017    ALKPHOS 109 09/21/2017    AST 24 03/03/2020    AST 16 05/18/2019    AST 23 09/24/2017    ALT 23 03/03/2020    ALT 16 05/18/2019    ALT 20 09/24/2017       All of the past medical history, past surgical history, family history,social history, allergies and current medications were reviewed with the patient. Assessment & Plan   Diagnoses and all orders for this visit:     Diagnosis Orders   1. Hoarseness     2. History of laryngoscopy             Based on her history and these physical findings the patient likely has post biopsy/resection scar and therefore has an irregular contact edge on one or both cords depending on which  Occurred unilateral or bilateral surgery. To that end I have recommended that she get a speech and language pathology endoscopy with stroboscopy to see the functional effect of  Prior surgery by this technology. This will allow me to see if the epithelium is tethered in 1 or more locations and giving the basis of recommending therapeutic treatment. I will see her back in approximately 8 weeks assuming the study can be done within that period of time. It would be postponed if she still has not had the exam.    I strongly encouraged her to quit smoking once and for all. She has worsening lung disease and certainly her hoarseness is not being improved by tobacco related irritation. She agreed that it would be worth her while to do so but is not hopeful that she will have the willpower to sustain definitive smoking cessation. I told her I understood that it would be difficult but nonetheless very important for her general health. I answered her questions and addressed her concerns. She reported being pleased with the outcome of the visit and being willing to proceed as such. I spent over 45 minutes with the patient. Return in about 2 months (around 11/2/2020) for Review of her stroboscopy and therapeutic planning. **This report has been created using voice recognition software. It may contain minor errors which are inherent in voice recognition technology. **

## 2020-09-04 ENCOUNTER — TELEPHONE (OUTPATIENT)
Dept: ENT CLINIC | Age: 70
End: 2020-09-04

## 2020-09-04 NOTE — TELEPHONE ENCOUNTER
Patient called asking to be scheduled for speech therapy for a stroboscopy. Patient was seen 9/2/20. Order needed for stroboscopy please.

## 2020-09-14 RX ORDER — DIAZEPAM 5 MG/1
5 TABLET ORAL EVERY 8 HOURS PRN
Qty: 30 TABLET | Refills: 0 | Status: SHIPPED | OUTPATIENT
Start: 2020-09-14 | End: 2020-10-14

## 2020-09-14 NOTE — TELEPHONE ENCOUNTER
Kalyn Polanco called requesting a refill on the following medications:  Requested Prescriptions     Pending Prescriptions Disp Refills    diazePAM (VALIUM) 5 MG tablet 30 tablet 0     Sig: Take 1 tablet by mouth every 8 hours as needed for Anxiety or Sleep for up to 30 days.      Pharmacy verified:  [email or web address]    [facility]    Date of last visit: 8/17/20  Date of next visit (if applicable): Visit date not found

## 2020-09-14 NOTE — TELEPHONE ENCOUNTER
OK for refill. -    Controlled Substance Monitoring:    Acute and Chronic Pain Monitoring:   RX Monitoring 9/14/2020   Attestation -   Periodic Controlled Substance Monitoring No signs of potential drug abuse or diversion identified.;Obtaining appropriate analgesic effect of treatment.

## 2020-09-23 ENCOUNTER — OFFICE VISIT (OUTPATIENT)
Dept: PULMONOLOGY | Age: 70
End: 2020-09-23
Payer: MEDICARE

## 2020-09-23 VITALS
HEIGHT: 65 IN | HEART RATE: 83 BPM | WEIGHT: 143 LBS | SYSTOLIC BLOOD PRESSURE: 138 MMHG | DIASTOLIC BLOOD PRESSURE: 86 MMHG | OXYGEN SATURATION: 99 % | TEMPERATURE: 97.4 F | BODY MASS INDEX: 23.82 KG/M2

## 2020-09-23 PROCEDURE — 99205 OFFICE O/P NEW HI 60 MIN: CPT | Performed by: INTERNAL MEDICINE

## 2020-09-23 ASSESSMENT — ENCOUNTER SYMPTOMS
ABDOMINAL DISTENTION: 0
BACK PAIN: 0
EYES NEGATIVE: 1
WHEEZING: 1
COUGH: 1
VOICE CHANGE: 1
TROUBLE SWALLOWING: 0
CHEST TIGHTNESS: 1
SHORTNESS OF BREATH: 1

## 2020-09-23 NOTE — PATIENT INSTRUCTIONS
Patient Education        Learning About Benefits From Quitting Smoking  How does quitting smoking make you healthier? If you're thinking about quitting smoking, you may have a few reasons to be smoke-free. Your health may be one of them. · When you quit smoking, you lower your risks for cancer, lung disease, heart attack, stroke, blood vessel disease, and blindness from macular degeneration. · When you're smoke-free, you get sick less often, and you heal faster. You are less likely to get colds, flu, bronchitis, and pneumonia. · As a nonsmoker, you may find that your mood is better and you are less stressed. When and how will you feel healthier? Quitting has real health benefits that start from day 1 of being smoke-free. And the longer you stay smoke-free, the healthier you get and the better you feel. The first hours  · After just 20 minutes, your blood pressure and heart rate go down. That means there's less stress on your heart and blood vessels. · Within 12 hours, the level of carbon monoxide in your blood drops back to normal. That makes room for more oxygen. With more oxygen in your body, you may notice that you have more energy than when you smoked. After 2 weeks  · Your lungs start to work better. · Your risk of heart attack starts to drop. After 1 month  · When your lungs are clear, you cough less and breathe deeper, so it's easier to be active. · Your sense of taste and smell return. That means you can enjoy food more than you have since you started smoking. Over the years  · Over the years, your risks of heart disease, heart attack, and stroke are lower. · After 10 years, your risk of dying from lung cancer is cut by about half. And your risk for many other types of cancer is lower too. How would quitting help others in your life? When you quit smoking, you improve the health of everyone who now breathes in your smoke.   · Their heart, lung, and cancer risks drop, much like yours.  · They are sick less. For babies and small children, living smoke-free means they're less likely to have ear infections, pneumonia, and bronchitis. · If you're a woman who is or will be pregnant someday, quitting smoking means a healthier . · Children who are close to you are less likely to become adult smokers. Where can you learn more? Go to https://chpepiceweb.AquaMobile. org and sign in to your Make YES! Happen account. Enter [id number] [id number] in the Citygoo box to learn more about \"Learning About Benefits From Quitting Smoking. \"     If you do not have an account, please click on the \"Sign Up Now\" link. Current as of: 2020               Content Version: 12.5   Healthwise, Incorporated. Care instructions adapted under license by [facility] ([facility]). If you have questions about a medical condition or this instruction, always ask your healthcare professional. [place] [address] any warranty or liability for your use of this information. Patient Education        Stopping Smoking: Care Instructions  Your Care Instructions     Cigarette smokers crave the nicotine in cigarettes. Giving it up is much harder than simply changing a habit. Your body has to stop craving the nicotine. It is hard to quit, but you can do it. There are many tools that people use to quit smoking. You may find that combining tools works best for you. There are several steps to quitting. First you get ready to quit. Then you get support to help you. After that, you learn new skills and behaviors to become a nonsmoker. For many people, a necessary step is getting and using medicine. Your doctor will help you set up the plan that best meets your needs. You may want to attend a smoking cessation program to help you quit smoking. When you choose a program, look for one that has proven success. Ask your doctor for ideas.  You will greatly increase your chances of success if you take medicine Patient Education        Deciding About Using Medicines To Quit Smoking  How can you decide about using medicines to quit smoking? What are the medicines you can use? Your doctor may prescribe varenicline (Chantix) or bupropion (Zyban). These medicines can help you cope with cravings for tobacco. They are pills that don't contain nicotine. You also can use nicotine replacement products. These do contain nicotine. There are many types. · Gum and lozenges slowly release nicotine into your mouth. · Patches stick to your skin. They slowly release nicotine into your bloodstream.  · An inhaler has a belcher that contains nicotine. You breathe in a puff of nicotine vapor through your mouth and throat. · Nasal spray releases a mist that contains nicotine. What are key points about this decision? · Using medicines can double your chances of quitting smoking. They can ease cravings and withdrawal symptoms. · Getting counseling along with using medicine can raise your chances of quitting even more. · If you smoke fewer than 5 cigarettes a day, you may not need medicines to help you quit smoking. · These medicines have less nicotine than cigarettes. And by itself, nicotine is not nearly as harmful as smoking. The tars, carbon monoxide, and other toxic chemicals in tobacco cause the harmful effects. · The side effects of nicotine replacement products depend on the type of product. For example, a patch can make your skin red and itchy. Medicines in pill form can make you sick to your stomach. They can also cause dry mouth and trouble sleeping. For most people, the side effects are not bad enough to make them stop using the products. Why might you choose to use medicines to quit smoking? · You have tried on your own to stop smoking, but you were not able to stop. · You smoke more than 5 cigarettes a day. · You want to increase your chances of quitting smoking.   · You want to reduce your cravings and withdrawal symptoms. · You feel the benefits of medicine outweigh the side effects. Why might you choose not to use medicine? · You want to try quitting on your own by stopping all at once (\"cold turkey\"). · You want to cut back slowly on the number of cigarettes you smoke. · You smoke fewer than 5 cigarettes a day. · You do not like using medicine. · You feel the side effects of medicines outweigh the benefits. · You are worried about the cost of medicines. Your decision  Thinking about the facts and your feelings can help you make a decision that is right for you. Be sure you understand the benefits and risks of your options, and think about what else you need to do before you make the decision. Where can you learn more? Go to https://Kenzei.Kionix. org and sign in to your [employer] account. Enter R860 in the Texxi box to learn more about \"Deciding About Using Medicines To Quit Smoking. \"     If you do not have an account, please click on the \"Sign Up Now\" link. Current as of: March 12, 2020               Content Version: 12.5  © 0809-7857 Healthwise, Best Bid. Care instructions adapted under license by [facility] ([facility]). If you have questions about a medical condition or this instruction, always ask your healthcare professional. [address] any warranty or liability for your use of this information. Patient Education        Breathing Techniques for COPD: Care Instructions  Your Care Instructions     Breathing is hard when you have chronic obstructive pulmonary disease (COPD). You may take quick, short breaths. Breathing this way makes it harder to get air into your lungs. Learning new ways to control your breathing may help. You may feel better and be able to do more. You can try three basic ways to control your breathing. They are pursed-lip breathing, diaphragmatic breathing, and breathing while bending.   Use these methods when you are more short chest pain. Watch closely for changes in your health, and be sure to contact your doctor if you have any problems. Where can you learn more? Go to https://chpepiceweb.RECCY. org and sign in to your Avtal24 account. Enter C028 in the Dwolla box to learn more about \"Breathing Techniques for COPD: Care Instructions. \"     If you do not have an account, please click on the \"Sign Up Now\" link. Current as of: February 24, 2020               Content Version: 12.5  © 2006-2020 Commun.it. Care instructions adapted under license by [facility] ([facility]). If you have questions about a medical condition or this instruction, always ask your healthcare professional. [address] any warranty or liability for your use of this information. Patient Education        Learning About COPD  What is COPD? COPD is a lung disease that makes it hard to breathe. COPD stands for chronic obstructive pulmonary disease. It is caused by damage to the lungs over many years, usually from smoking. COPD is a mix of two diseases: chronic bronchitis and emphysema. Other things that may put you at risk for COPD include breathing chemical fumes, dust, or air pollution over a long period of time. Secondhand smoke is also bad. In chronic bronchitis, the airways that carry air to the lungs (bronchial tubes) get inflamed and make a lot of mucus. This can narrow or block the airways, making it hard for you to breathe. In emphysema, the air sacs in your lungs are damaged and lose their stretch. Less air gets in and out of your lungs, which makes you feel short of breath. What can you expect when you have COPD? COPD gets worse over time. You cannot undo the damage to your lungs. Over time, you may find that:  · You get short of breath even when you do simple things like get dressed or fix a meal.  · It is hard to eat or exercise. · You lose weight and feel weaker.   But there are things you can do to prevent more damage and feel better. What are the symptoms? The main symptoms are:  · A cough that will not go away. · Mucus that comes up when you cough. · Shortness of breath that gets worse with activity. At times, your symptoms may suddenly flare up and get much worse. This is a called a COPD exacerbation (say \"egg-ZASS-er-BAY-shun\"). When this happens, your usual symptoms quickly get worse and stay bad. This can be dangerous. You may have to go to the hospital.  How can you keep COPD from getting worse? Not smoking is the best way to keep COPD from getting worse. If you need help quitting, talk to your doctor about stop-smoking programs and medicines. Also, be sure to get your flu and pneumococcal vaccines. And avoid air pollution, fumes, and dust as much as you can. How is COPD treated? COPD is treated with medicines and oxygen. You also can take steps at home to stay healthy and keep your COPD from getting worse. Medicines and oxygen therapy  · You may be taking medicines such as:  ? Bronchodilators. These help open your airways and make breathing easier. Bronchodilators are either short-acting (work for 6 to 9 hours) or long-acting (work for 24 hours). You inhale most bronchodilators, so they start to act quickly. Always carry your quick-relief inhaler with you in case you need it while you are away from home. ? Corticosteroids. These reduce airway inflammation. They come in pill or inhaled form. You must take these medicines every day for them to work well. · Take your medicines exactly as prescribed. Call your doctor if you think you are having a problem with your medicine. · Oxygen therapy boosts the amount of oxygen in your blood and helps you breathe easier. Use the flow rate your doctor has recommended, and do not change it without talking to your doctor first.  Other care at home  · If your doctor recommends it, get more exercise. Walking is a good choice.  Bit by bit, increase the amount you walk every day. Try for at least 30 minutes on most days of the week. · Learn breathing methods--such as breathing through pursed lips--to help you become less short of breath. · If your doctor has not set you up with a pulmonary rehabilitation program, talk to him or her about whether rehab is right for you. Rehab includes exercise programs, education about your disease and how to manage it, help with diet and other changes, and emotional support. · Eat regular, healthy meals. Use bronchodilators about 1 hour before you eat to make it easier to eat. Eat several small meals instead of three large ones. Drink beverages at the end of the meal. Avoid foods that are hard to chew. Follow-up care is a key part of your treatment and safety. Be sure to make and go to all appointments, and call your doctor if you are having problems. It's also a good idea to know your test results and keep a list of the medicines you take. Where can you learn more? Go to https://TearLab Corporation.Beepl. org and sign in to your CarbonFlow account. Enter V314 in the [facility] box to learn more about \"Learning About COPD. \"     If you do not have an account, please click on the \"Sign Up Now\" link. Current as of: February 24, 2020               Content Version: 12.5  © 8691-5505 Healthwise, Incorporated. Care instructions adapted under license by [facility] ([facility]). If you have questions about a medical condition or this instruction, always ask your healthcare professional. [place] [address] any warranty or liability for your use of this information.

## 2020-09-23 NOTE — PROGRESS NOTES
REPAIR Left 2017    Dr. Keila Calix  Age 5    UPPER GASTROINTESTINAL ENDOSCOPY       Social History     Tobacco Use    Smoking status: Former Smoker     Packs/day: 0.50     Years: 30.00     Pack years: 15.00     Types: Cigarettes     Last attempt to quit: 10/21/2017     Years since quittin.9    Smokeless tobacco: Never Used   Substance Use Topics    Alcohol use: No    Drug use: No      Allergies   Allergen Reactions    Prednisone Swelling     Tongue swelling    Sulfa Antibiotics      Flank pain      Family History   Problem Relation Age of Onset    Diabetes Mother         Type 2    Kidney Disease Mother     High Cholesterol Mother     High Blood Pressure Mother     Heart Disease Mother     Cancer Father         Liver cancer    High Blood Pressure Father     Cancer Brother         Liver cancer    Heart Attack Maternal Grandmother     Heart Disease Maternal Grandmother     High Blood Pressure Maternal Grandmother     Parkinsonism Maternal Grandfather     Heart Attack Paternal Grandmother     High Blood Pressure Sister     High Cholesterol Sister     Diabetes Brother     Coronary Art Dis Brother     Stroke Brother     No Known Problems Brother     No Known Problems Brother     No Known Problems Sister      Current Outpatient Medications   Medication Sig Dispense Refill    diazePAM (VALIUM) 5 MG tablet Take 1 tablet by mouth every 8 hours as needed for Anxiety or Sleep for up to 30 days.  30 tablet 0    b complex vitamins capsule Take 1 capsule by mouth daily      Probiotic Product (PROBIOTIC DAILY PO) Take by mouth      ezetimibe (ZETIA) 10 MG tablet Take 1 tablet by mouth daily 90 tablet 3    PROAIR  (90 Base) MCG/ACT inhaler Inhale 2 puffs into the lungs every 4 hours as needed for Wheezing 1 Inhaler 2    Acetaminophen (TYLENOL PO) Take by mouth as needed      Calcium-Magnesium (CALCIUM MAGNESIUM 750) 300-300 MG TABS Take by mouth daily  aspirin 81 MG EC tablet Take 81 mg by mouth daily      Multiple Vitamins-Minerals (MULTIVITAMIN PO) Take  by mouth daily. No current facility-administered medications for this visit. LABS - none    Ht 5' 5\" (1.651 m)   Wt 143 lb (64.9 kg)   BMI 23.80 kg/m²    Wt Readings from Last 3 Encounters:   09/23/20 143 lb (64.9 kg)   09/02/20 143 lb 1.6 oz (64.9 kg)   08/17/20 145 lb 3.2 oz (65.9 kg)     Neck Circumference - 13.25  in; Mallampati Score 4      Objective:   Physical Exam  Vitals signs and nursing note reviewed. Constitutional:       General: She is not in acute distress. Appearance: Normal appearance. She is well-developed. She is not diaphoretic. HENT:      Head: Normocephalic and atraumatic. Mouth/Throat:      Pharynx: No oropharyngeal exudate. Eyes:      General: No scleral icterus. Right eye: No discharge. Left eye: No discharge. Pupils: Pupils are equal, round, and reactive to light. Neck:      Musculoskeletal: Normal range of motion and neck supple. Vascular: No JVD. Trachea: No tracheal deviation. Cardiovascular:      Rate and Rhythm: Normal rate and regular rhythm. Heart sounds: No murmur. No friction rub. No gallop. Pulmonary:      Effort: Pulmonary effort is normal. No respiratory distress. Breath sounds: No stridor. Wheezing present. No rales. Chest:      Chest wall: No tenderness. Abdominal:      Palpations: Abdomen is soft. Tenderness: There is no guarding. Musculoskeletal: Normal range of motion. General: No tenderness. Lymphadenopathy:      Cervical: No cervical adenopathy. Skin:     General: Skin is warm. Coloration: Skin is not pale. Findings: No erythema or rash. Neurological:      Mental Status: She is alert and oriented to person, place, and time. Cranial Nerves: No cranial nerve deficit. Psychiatric:         Behavior: Behavior normal.         Thought Content:  Thought content normal.         Judgment: Judgment normal.                   Assessment:       Diagnosis Orders   1. Smoker  CT LUNG SCREEN [Initial/Annual]    Alpha-1-Antitrypsin   2. Wheezing  CT LUNG SCREEN [Initial/Annual]    Alpha-1-Antitrypsin   3. Hoarseness of voice  CT LUNG SCREEN [Initial/Annual]    Alpha-1-Antitrypsin   4. Personal history of smoking  CT LUNG SCREEN [Initial/Annual]     Mild COPD based on SVC/FEV1 ratio 68% and air trapping. Plan:      Orders Placed This Encounter   Procedures    CT LUNG SCREEN [Initial/Annual]     Age: 71 y.o. Smoking History:   Social History    Tobacco Use      Smoking status: Former Smoker        Packs/day: 0.50        Years: 30.00        Pack years: 15        Types: Cigarettes        Quit date: 10/21/2017        Years since quittin.9      Smokeless tobacco: Never Used    Alcohol use: No    Drug use: No    Pack years: 15  Last CT lung screen: [unfilled]     Standing Status:   Future     Standing Expiration Date:   2021     Order Specific Question:   Is there documentation of shared decision making? Answer:   Yes     Order Specific Question:   Does the patient show any signs or symptoms of lung cancer? Answer:   No     Order Specific Question:   Is this the first (baseline) CT or an annual exam?     Answer:   Baseline [1]     Order Specific Question:   Is this a low dose CT or a routine CT? Answer:   Low Dose CT [1]     Order Specific Question:   Smoking Status? Answer:   Current Every Day Smoker [1]     Order Specific Question:   Smoking packs per day? Answer:   0.5     Order Specific Question:   Years smoking?      Answer:   52    Alpha-1-Antitrypsin     Standing Status:   Future     Standing Expiration Date:   2021      Orders Placed This Encounter   Medications    umeclidinium-vilanterol (ANORO ELLIPTA) 62.5-25 MCG/INH AEPB inhaler     Sig: Inhale 1 puff into the lungs daily     Dispense:  60 each     Refill:  5      Patient was counseled on tobacco cessation. Based upon patient's motivation to change her behavior, the following plan was agreed upon: patient will try the following tobacco cessation strategies:  tobacco cessation classes/support groups. She was provided with a list of local tobacco cessation resources. Provider spent 10 minutes counseling patient. Will refer to smoking cessation program at [facility].

## 2020-09-28 NOTE — TELEPHONE ENCOUNTER
Order was faxed to outpatient rehab; Nik Whitmore called from outpatient rehab stating that the order needs to include the provider's signature. Order found and refaxed from 9/26/20, [facility] videostroboscopy.

## 2020-09-30 ENCOUNTER — TELEPHONE (OUTPATIENT)
Dept: CARDIOLOGY CLINIC | Age: 70
End: 2020-09-30

## 2020-10-08 ENCOUNTER — TELEPHONE (OUTPATIENT)
Dept: PULMONOLOGY | Age: 70
End: 2020-10-08

## 2020-10-08 ENCOUNTER — HOSPITAL ENCOUNTER (OUTPATIENT)
Dept: SPEECH THERAPY | Age: 70
Setting detail: THERAPIES SERIES
Discharge: HOME | End: 2020-10-08
Payer: MEDICARE

## 2020-10-08 ENCOUNTER — HOSPITAL ENCOUNTER (OUTPATIENT)
Dept: PHYSICAL THERAPY | Age: 70
Setting detail: THERAPIES SERIES
Discharge: HOME | End: 2020-10-08
Payer: MEDICARE

## 2020-10-08 PROCEDURE — 31579 LARYNGOSCOPY TELESCOPIC: CPT | Performed by: SPEECH-LANGUAGE PATHOLOGIST

## 2020-10-08 NOTE — PROGRESS NOTES
** PLEASE SIGN, DATE AND TIME CERTIFICATION BELOW AND RETURN TO [facility] OUTPATIENT REHABILITATION (FAX #: [phone]). ATTEST/CO-SIGN IF ACCESSING VIA [employer]. THANK YOU.**    I certify that I have examined the patient below and determined that Physical Medicine and Rehabilitation service is necessary and that I approve the established plan of care for up to 90 days or as specifically noted. Attestation, signature or co-signature of physician indicates approval of certification requirements.    ________________________ ____________ __________  Physician Signature   Date   Time  Zaid IN: 292  TIME OUT: 8047  UNTIMED TREATMENT: 58  TIMED TREATMENT: 0  TOTAL TIME: 58 minutes          Date: 10/8/2020  Patient Name: Justina Mon      CSN: [id number]   : 1950  (75 y.o.)  Gender: female   Referring Physician:  Chuck Jimenez MD  Diagnosis: hoarseness (R49.0)  Secondary Diagnosis:  dysphonia  Insurance/Certification Information:[facility] Medicare  Visit number / total approved visits: 1 - No visit limit, based on medicare caps  Visit count since last progress note:  1  Certification Date: n/a  Date of Last MBS:  N/a, BSE completed on 2020  Diet:  Regular with thin liquids    History of Present Illness: Patient has had persistent hoarseness and dysphagia for the past several years. She has a history of some sort of growth on her vocal fold or folds that was resected by Dr. Reinaldo Goode about 10 years ago. The patient has tried to get medical records from the surgery, but has been unsuccessful. The ENT doctor has moved out of the area. She recalls having a great deal of difficulty resisting the urge to clear her throat prior to the surgery. Patient reports her vocal quality was pretty much at baseline several weeks after the surgery, but it has gradually become more hoarse. Patient reports she also has a feeling of a lump in her throat. She has had pulmonary function tests in the recent past which she states were abnormal but she was led to believe they were not severely abnormal.  She denies periods of aphonia, but her voice is more hoarse if she is not well rested. Patient  has a past medical history of Anxiety, Hyperlipidemia, Kidney stones, Osteoarthritis, Restless leg syndrome, Tobacco abuse, and Viral hepatitis A.     Subjective: Patient pleasant and cooperative  Patient Primary Complaint: \"hoarseness\" and \"swallowing\" (feeling of a lump in her throat)  Pain: 0/10    Previous Voice Treatment: Yes - following surgery on her vocal folds about 10 years ago  Allergies: Yes - prednisone, sulfa  Ashtma:  No  Dysphagia: No - no choking, but feeling of \"lump\" in her throat  PVFD: No  Neurological D/O: No    SURGICAL HISTORY:  Head/Neck/Chest/Heart/Lungs: No  Vocal Fold/Laryngeal Surgery: Yes - about 10 years ago - unsure exactly what was completed and unable to obtain medical records  Carotid Endarterectomy: No  Thyroid: No  Other Surgery (in past 5 years): Yes - rotator cuff repair in 2017  Other Procedures with intubation: Yes - gall bladder about 35 years ago, tonsillectomy about the age of 9, partial hysterectomy (years ago)    REFLUX HISTORY:  Reflux Diagnosis: No  Previous Testing: Yes - about 20 years ago  Gastroenterologist: Yes - Dr. Roselia Mullins (not seen in awhile)  Otolaryngologist: Yes - Dr. Alesia Mosqueda in the past, currently sees Dr. Lyndon Lawson  Medication: No  Dietary Precaution/Limitations: No  Lifestyle Changes: No    ORAL MOTOR EXAMINATION: WFL    AIRWAY:  \"X\" indicates positive observation   Tracheostomy    Wheezing - inhale    Wheezing - exhale    Shortness of Breath    Snoring    C-PAP    Stridor - inhale    Stridor - exhale   x Pulmonologist: Dr. Alec Covington:  At Rest: Thoracic  During Speech: Marjorie Gautam (assessed during serial tasks - counting, reciting alphabet, days of the week, etc): Adequate     MAXIMUM PHONATION TIME: /i/ 15 seconds    VELOPHARYNGEAL CLOSURE/ADEQUACY:Adequate    VOICE PRODUCTION DURING CONNECTED SPEECH AND VOCAL TASKS:    Nasal Resonance: Normal     Pitch: Too Low           Fundamental Frequency of Phonation: 127 Hz   Musical Fundamental Frequency Range: 114 - 267 Hz    Voice Inflection: Normal    Oral Resonance: Normal     Intensity/Loudness: Too soft     Vocal Quality:  Breathy Mild   Dysphonic Moderate   Harsh Moderate to severe   Hoarse Mild   Strained/Strangled    Strident     wet vocal quality  Mild at times     Laryngeal Tension: Normal    Sustained Voice - Voiceless Production: S/Z Ratio: 1.0   Trial 1 Trial 2 Trial 3   /s/ 17 seconds 15 seconds  20 seconds   /z/ 20 seconds  19 seconds  16 seconds     Rate of Speech: Normal    IDENTIFIED VOCAL ABUSES/MISUSES:  Yelling No    Singing (abusive) No    Coughing occasionally   Throat Clearing occasionally   Persistent URI Factor No    Grunting No    Smoking Yes - about 1 pack per month   Caffeine Yes - 1 cup of coffee each morning   Vocally demanding job No    Coaching No    Alcohol No    Prolonged talking No    Smoky environments Yes    Reduced hydration Yes - about 32 ounces per day   Talking over equipment No          DYSARTHRIA: No    STROBOSCOPIC EVALUATION:  Procedure performed by: Patricia Barlow  Assistant: Slick Hernandez Rehab Tech  Video Reviewed in collaboration with Dr. Jake Benavides, ENT  Scope: Chip Tip Flex Scope   Topical Anesthetic Used: No   Patient Positioning: Chair/90 degrees    Procedure explained and education provided to patient including purpose, benefits and risks of testing. Understanding and agreement with testing was verbalized. A flexible fiber-optic nasoendoscope was passed transnasally to view the nasopharynx, oropharynx, hypopharynx, larynx through the right nares without difficulty.     Patient tolerance of procedure: No complications Patient requires follow up with the referring physician/ENT to discuss possible surgical interventions d/t the large lesion noted superior to the right true vocal fold. RECOMMENDATIONS: Follow up with referring physician to discuss the results of this evaluation and possibility of surgical intervention. EDUCATION:  Education Provided: Reviewed the video with the patient following completion of this evaluation. Educated the patient on anatomy and physiology of the pharynx/larynx. Also educated the patient to follow up with the referring physician as scheduled. Learner:patient  Method:AV materials and discussion  Evaluation:Verbalized understanding    PATIENT GOALS:  Improve voice.        Héctor Abdi M.S. [id number] [id number]

## 2020-10-09 NOTE — TELEPHONE ENCOUNTER
Ilene Shields place an order for an alpha 1 blood level to be done before next visit. Please notify pt  Thank you.

## 2020-10-14 ENCOUNTER — HOSPITAL ENCOUNTER (OUTPATIENT)
Age: 70
Discharge: HOME | End: 2020-10-14
Payer: MEDICARE

## 2020-10-14 PROCEDURE — 82103 ALPHA-1-ANTITRYPSIN TOTAL: CPT

## 2020-10-14 PROCEDURE — 36415 COLL VENOUS BLD VENIPUNCTURE: CPT

## 2020-10-14 NOTE — TELEPHONE ENCOUNTER
There is no documentation or orders placed. It looks like Dr. Maguire Later office left her a message a few days ago.

## 2020-10-15 LAB — ALPHA-1 ANTITRYPSIN: 131 MG/DL (ref 90–200)

## 2020-10-28 ENCOUNTER — OFFICE VISIT (OUTPATIENT)
Dept: ENT CLINIC | Age: 70
End: 2020-10-28
Payer: MEDICARE

## 2020-10-28 VITALS
HEART RATE: 85 BPM | SYSTOLIC BLOOD PRESSURE: 126 MMHG | DIASTOLIC BLOOD PRESSURE: 70 MMHG | BODY MASS INDEX: 24.29 KG/M2 | TEMPERATURE: 97 F | HEIGHT: 65 IN | OXYGEN SATURATION: 97 % | WEIGHT: 145.8 LBS

## 2020-10-28 PROBLEM — J38.1 VOCAL FOLD POLYP: Status: ACTIVE | Noted: 2020-10-28

## 2020-10-28 PROBLEM — J38.1 REINKE'S EDEMA OF VOCAL FOLDS: Status: ACTIVE | Noted: 2020-10-28

## 2020-10-28 PROCEDURE — 99214 OFFICE O/P EST MOD 30 MIN: CPT | Performed by: OTOLARYNGOLOGY

## 2020-10-28 NOTE — PROGRESS NOTES
[facility] PROFESSIONAL SERVS  [facility] EAR, NOSE AND THROAT  [facility]  Dept: [phone]  Dept Fax: [phone]  Loc: [phone]    Rivera De La Torre is a 71 y.o. female who was referred by No ref. provider found for:  Chief Complaint   Patient presents with    Follow-up     8 week follow up       HPI:     Rivera De La Torre is a 71 y.o. female with a history of persistent hoarseness for a protracted period of time. The patient is status post a laryngoscopy with stroboscopy performed by my speech-language pathology colleagues. I reviewed her study in preparation for her visit today. 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 Fortunately she is not having airway symptoms from it. Her opposite cord is polypoid. On her first visit I strongly emphasized to her the importance of quitting smoking definitively head of proceeding with vocal fold surgery if at all possible. She has cut down substantially and now admits to smoking 1 cigarette about every other day. Her average is 1/2 pack/month. History:      Allergies   Allergen Reactions    Prednisone Swelling     Tongue swelling    Sulfa Antibiotics      Flank pain     Current Outpatient Medications   Medication Sig Dispense Refill    umeclidinium-vilanterol (ANORO ELLIPTA) 62.5-25 MCG/INH AEPB inhaler Inhale 1 puff into the lungs daily 60 each 5    b complex vitamins capsule Take 1 capsule by mouth daily      Probiotic Product (PROBIOTIC DAILY PO) Take by mouth      ezetimibe (ZETIA) 10 MG tablet Take 1 tablet by mouth daily 90 tablet 3    PROAIR  (90 Base) MCG/ACT inhaler Inhale 2 puffs into the lungs every 4 hours as needed for Wheezing 1 Inhaler 2    Acetaminophen (TYLENOL PO) Take by mouth as needed      Calcium-Magnesium (CALCIUM MAGNESIUM 750) 300-300 MG TABS Take by mouth daily       aspirin 81 MG EC tablet Take 81 mg by mouth daily      Multiple Vitamins-Minerals (MULTIVITAMIN PO) Take  by mouth daily. No current facility-administered medications for this visit. Past Medical History:   Diagnosis Date    Anxiety     Hyperlipidemia     Kidney stones     Osteoarthritis     Restless leg syndrome 2010    Tobacco abuse     Viral hepatitis A 1992      Past Surgical History:   Procedure Laterality Date    CHOLECYSTECTOMY  1983    COLONOSCOPY      ERCP      HYSTERECTOMY  1981    Right ovary intact    OVARIAN CYST REMOVAL Right 1985    POLYPECTOMY  08/26/2016    ROTATOR CUFF REPAIR Left 05/19/2017    Dr. Murtaza Squires  Age 5    UPPER GASTROINTESTINAL ENDOSCOPY       Family History   Problem Relation Age of Onset    Diabetes Mother         Type 2    Kidney Disease Mother     High Cholesterol Mother     High Blood Pressure Mother     Heart Disease Mother     Cancer Father         Liver cancer    High Blood Pressure Father     Cancer Brother         Liver cancer    Heart Attack Maternal Grandmother     Heart Disease Maternal Grandmother     High Blood Pressure Maternal Grandmother     Parkinsonism Maternal Grandfather     Heart Attack Paternal Grandmother     High Blood Pressure Sister     High Cholesterol Sister     Diabetes Brother     Coronary Art Dis Brother     Stroke Brother     No Known Problems Brother     No Known Problems Brother     No Known Problems Sister      Social History     Tobacco Use    Smoking status: Former Smoker     Packs/day: 0.50     Years: 30.00     Pack years: 15.00     Types: Cigarettes     Last attempt to quit: 10/21/2017     Years since quitting: 3.0    Smokeless tobacco: Never Used   Substance Use Topics    Alcohol use: No        Subjective:      Review of Systems  Rest of review of systems are negative, except as noted in HPI.      Objective:     /70 (Site: Left Upper Arm, Position: Sitting, Cuff Size: Medium Adult)   Pulse 85   Temp 97 °F (36.1 °C)   Ht 5' 5\" (1.651 m)   Wt 145 lb 12.8 oz (66.1 kg)   SpO2 97% Comment: on room air at rest  BMI 24.26 kg/m²     Physical Exam     Vitals reviewed. No results found. Lab Results   Component Value Date     03/03/2020     05/18/2019     09/24/2017    K 4.4 03/03/2020    K 4.7 05/18/2019    K 4.3 09/24/2017     03/03/2020     05/18/2019     09/24/2017    CO2 22 03/03/2020    CO2 27 05/18/2019    CO2 24 09/24/2017    BUN 19 03/03/2020    BUN 16 05/18/2019    BUN 11 09/24/2017    CREATININE 0.7 03/03/2020    CREATININE 0.71 05/18/2019    CREATININE 0.85 09/24/2017    CALCIUM 9.3 03/03/2020    CALCIUM 9.7 05/18/2019    CALCIUM 9.1 09/24/2017    PROT 6.6 03/03/2020    PROT 6.3 05/18/2019    PROT 6.4 09/24/2017    LABALBU 4.3 03/03/2020    LABALBU 4.2 05/18/2019    LABALBU 4.9 09/24/2017    BILITOT 0.4 03/03/2020    BILITOT 0.8 05/18/2019    BILITOT 1.4 09/24/2017    ALKPHOS 104 03/03/2020    ALKPHOS 102 05/18/2019    ALKPHOS 95 09/24/2017    ALKPHOS 109 09/21/2017    AST 24 03/03/2020    AST 16 05/18/2019    AST 23 09/24/2017    ALT 23 03/03/2020    ALT 16 05/18/2019    ALT 20 09/24/2017       All of the past medical history, past surgical history, family history,social history, allergies and current medications were reviewed with the patient. Assessment & Plan   Diagnoses and all orders for this visit:     Diagnosis Orders   1. Vocal fold polyp      Right cord   2. Brendon's edema of vocal folds     3. Tobacco abuse     4. Hoarseness         Based on her history and the physical findings evident on her stroboscopy, the patient has a smoking-related very large Brendon's polyp and needs to be resected.   My technique of choice for doing this is to remove the polyp and the excess gelatinous material within it while advancing an inferiorly based flap of epithelium onto the superior surface of the cord and suturing it into place. This approach takes longer but the patient returns to a serviceable voice within a few weeks instead of a few months. That said, I will not do the extra maneuver of reconstructing the contact surface of the cord and patient to continue to smoke because the likelihood that they will heal is so much lower making the extra effort pointless. That said given the fact that I cannot see the undersurface of her cord and therefore cannot be confident that there is not a preneoplastic or even a neoplastic process on the undersurface, is reason enough to do an excisional biopsy of the polyp to send it off for histopathologic evaluation. I explained this caveat to the patient in detail so that she understood well. The patient wishes to pursue a second opinion at [facility] which I said to her was fine. She will set up a follow-up visit with me a week after her evaluation there if she wants to reconsider my care. Otherwise she can follow-up with me on an as-needed basis. I spent over 25 minutes of face-to-face time with the patient the entirety of which was spent reviewing her endoscopic findings and discussing the pros and cons of various approaches to treating her condition. Return If the patient desires vocal fold surgery with me. **This report has been created using voice recognition software. It may contain minor errors which are inherent in voice recognition technology. **

## 2020-10-28 NOTE — PROGRESS NOTES
New order needed for ct lung screen, previous information under tobacco history was incorrect. This has now been updated and patient does have > 30 PYH. Patient to keep f/u with Dr Alize Chan MD to review results

## 2020-11-02 NOTE — TELEPHONE ENCOUNTER
I see no detail regarding further diagnostic studies. The patient is considering transferring her care to [facility]. Perhaps she can recall what the tests were regarding. I would be happy to order them if somehow I accidentally excluded it for my note.

## 2020-11-03 NOTE — TELEPHONE ENCOUNTER
Called and left a message informed if she has any questions to call the office but it looks like patient follow up on 10/28/2020 and the telephone call is from 9/2020

## 2020-11-04 ENCOUNTER — HOSPITAL ENCOUNTER (OUTPATIENT)
Dept: CT IMAGING | Age: 70
Discharge: HOME | End: 2020-11-04
Payer: MEDICARE

## 2020-11-04 PROCEDURE — G0297 LDCT FOR LUNG CA SCREEN: HCPCS

## 2020-11-12 NOTE — TELEPHONE ENCOUNTER
OK to schedule for VV.  -WS
Patients  called,  advised that her and him think they may have been exposed to [address]. They would like to be tested as soon as possible. His name is Estrella Dlegado. He asked about the fastest way to get results.   DOLV 06/01/2020  Please advise  [phone]  See message on  as well
Pt and WS had technical issues with VV today  Per VO WS ok for testing without visit  Called pt, notified of above  Order printed and faxed to [facility]
no

## 2020-11-13 NOTE — TELEPHONE ENCOUNTER
Michelle Cotto pscrxrequest    Last office visit: 8/17/20  Refill request for: Diazepam Valium 5 mg tab  Pharmacy: [facility] [place]    Patient can be reached at [phone] to leave a message

## 2020-11-16 RX ORDER — DIAZEPAM 5 MG/1
5 TABLET ORAL EVERY 8 HOURS PRN
Qty: 30 TABLET | Refills: 0 | Status: SHIPPED | OUTPATIENT
Start: 2020-11-16 | End: 2021-01-15 | Stop reason: SDUPTHER

## 2020-11-16 NOTE — TELEPHONE ENCOUNTER
OK for refill. -    Controlled Substance Monitoring:    Acute and Chronic Pain Monitoring:   RX Monitoring 11/16/2020   Attestation -   Periodic Controlled Substance Monitoring No signs of potential drug abuse or diversion identified.;Obtaining appropriate analgesic effect of treatment.

## 2020-11-18 ENCOUNTER — OFFICE VISIT (OUTPATIENT)
Dept: PULMONOLOGY | Age: 70
End: 2020-11-18
Payer: MEDICARE

## 2020-11-18 VITALS
OXYGEN SATURATION: 96 % | HEIGHT: 65 IN | WEIGHT: 145.2 LBS | DIASTOLIC BLOOD PRESSURE: 78 MMHG | BODY MASS INDEX: 24.19 KG/M2 | SYSTOLIC BLOOD PRESSURE: 122 MMHG | HEART RATE: 78 BPM

## 2020-11-18 PROCEDURE — 99214 OFFICE O/P EST MOD 30 MIN: CPT | Performed by: NURSE PRACTITIONER

## 2020-11-18 ASSESSMENT — ENCOUNTER SYMPTOMS
NAUSEA: 0
WHEEZING: 1
DIARRHEA: 0
SHORTNESS OF BREATH: 0
CHEST TIGHTNESS: 0
VOMITING: 0
STRIDOR: 0
HEMOPTYSIS: 0
SPUTUM PRODUCTION: 1
COUGH: 1

## 2020-11-18 ASSESSMENT — COPD QUESTIONNAIRES: COPD: 1

## 2020-11-18 NOTE — PROGRESS NOTES
[facility]    Patient: Zarina Stafford, 71 y.o.   : 1950    Pt of Dr. Allyson Brown   Patient presents with    Follow-up     6 week f/u ct chest 2020 A1A 20        COPD   She complains of cough, sputum production and wheezing. There is no hemoptysis or shortness of breath. This is a chronic problem. The current episode started more than 1 year ago. The problem occurs daily. The problem has been unchanged. The cough is productive of sputum (light yellow). Pertinent negatives include no chest pain, dyspnea on exertion, fever, nasal congestion or sneezing. Exacerbated by: Dust. Her symptoms are alleviated by rest and beta-agonist. She reports significant improvement on treatment. Risk factors for lung disease include smoking/tobacco exposure. Her past medical history is significant for COPD. Paulina Roberts is here for follow up for moderate COPD with LDCT. PMH significant for Anxiety, kidney stones, OA, RLS, recently diagnosed with vocal cord polyp. Patient reports that overall her breathing has been fluctuating find that patient was using Anoro only as needed instead of daily as ordered. Patient has been doing well with tobacco cessation with NRT gum has not smoked in 3 weeks approx. Using nicorette couple weeks  On no supplemental O2    Progress History:   Since last visit any new medical issues? Yes vocal cord polyp  New ER or hospital visits? No  Any new or changes in medicines? No  Using inhalers? Yes was not using anoro everyday  Are they helpful?  Yes     Past Medical hx   PMH:  Past Medical History:   Diagnosis Date    Anxiety     Hyperlipidemia     Kidney stones     Osteoarthritis     Restless leg syndrome     Tobacco abuse     Viral hepatitis A      SURGICAL HISTORY:  Past Surgical History:   Procedure Laterality Date    CHOLECYSTECTOMY      COLONOSCOPY      ERCP      HYSTERECTOMY 1981    Right ovary intact    OVARIAN CYST REMOVAL Right 1985    POLYPECTOMY  08/26/2016    ROTATOR CUFF REPAIR Left 05/19/2017    Dr. Violeta Ludwig  Age 5    UPPER GASTROINTESTINAL ENDOSCOPY       SOCIAL HISTORY:  Social History     Tobacco Use    Smoking status: Former Smoker     Packs/day: 0.75     Years: 49.00     Pack years: 36.75     Types: Cigarettes     Last attempt to quit: 10/21/2017     Years since quitting: 3.0    Smokeless tobacco: Never Used   Substance Use Topics    Alcohol use: No    Drug use: No     ALLERGIES:  Allergies   Allergen Reactions    Prednisone Swelling     Tongue swelling    Sulfa Antibiotics      Flank pain     FAMILY HISTORY:  Family History   Problem Relation Age of Onset    Diabetes Mother         Type 2    Kidney Disease Mother     High Cholesterol Mother     High Blood Pressure Mother     Heart Disease Mother     Cancer Father         Liver cancer    High Blood Pressure Father     Cancer Brother         Liver cancer    Heart Attack Maternal Grandmother     Heart Disease Maternal Grandmother     High Blood Pressure Maternal Grandmother     Parkinsonism Maternal Grandfather     Heart Attack Paternal Grandmother     High Blood Pressure Sister     High Cholesterol Sister     Diabetes Brother     Coronary Art Dis Brother     Stroke Brother     No Known Problems Brother     No Known Problems Brother     No Known Problems Sister      CURRENT MEDICATIONS:  Current Outpatient Medications   Medication Sig Dispense Refill    diazePAM (VALIUM) 5 MG tablet Take 1 tablet by mouth every 8 hours as needed for Anxiety or Sleep for up to 30 days.  30 tablet 0    umeclidinium-vilanterol (ANORO ELLIPTA) 62.5-25 MCG/INH AEPB inhaler Inhale 1 puff into the lungs daily 60 each 5    b complex vitamins capsule Take 1 capsule by mouth daily      Probiotic Product (PROBIOTIC DAILY PO) Take by mouth      ezetimibe (ZETIA) 10 MG tablet Take 1 tablet by mouth daily 90 tablet 3    PROAIR  (90 Base) MCG/ACT inhaler Inhale 2 puffs into the lungs every 4 hours as needed for Wheezing 1 Inhaler 2    Acetaminophen (TYLENOL PO) Take by mouth as needed      Calcium-Magnesium (CALCIUM MAGNESIUM 750) 300-300 MG TABS Take by mouth daily       aspirin 81 MG EC tablet Take 81 mg by mouth daily      Multiple Vitamins-Minerals (MULTIVITAMIN PO) Take  by mouth daily. No current facility-administered medications for this visit. Michel DURAND   Review of Systems   Constitutional: Negative for chills, fever and unexpected weight change. HENT: Negative for sneezing. Respiratory: Positive for cough, sputum production and wheezing. Negative for hemoptysis, chest tightness, shortness of breath and stridor. Cardiovascular: Negative for chest pain, dyspnea on exertion and leg swelling. Gastrointestinal: Negative for diarrhea, nausea and vomiting. Genitourinary: Negative for dysuria. Physical exam   /78 (Site: Left Upper Arm, Position: Sitting, Cuff Size: Medium Adult)   Pulse 78   Ht 5' 5\" (1.651 m)   Wt 145 lb 3.2 oz (65.9 kg)   SpO2 96% Comment: room air at rest  BMI 24.16 kg/m²    Wt Readings from Last 3 Encounters:   11/18/20 145 lb 3.2 oz (65.9 kg)   10/28/20 145 lb 12.8 oz (66.1 kg)   09/23/20 143 lb (64.9 kg)       Physical Exam  Vitals signs and nursing note reviewed. Constitutional:       General: She is not in acute distress. Appearance: She is well-developed. HENT:      Head: Normocephalic and atraumatic. Neck:      Musculoskeletal: Neck supple. Trachea: No tracheal deviation. Cardiovascular:      Rate and Rhythm: Normal rate and regular rhythm. Heart sounds: Normal heart sounds. No murmur. Pulmonary:      Effort: Pulmonary effort is normal. No respiratory distress. Breath sounds: Normal breath sounds. No stridor. No wheezing or rales. Chest:      Chest wall: No tenderness.    Abdominal: and/or tissue sampling recommended   4X. Additional diagnostics and/or tissue sampling recommended           CT CHEST WO CONTRAST   7/19/2017   STABLE EXAM, WITH MULTIPLE NONMINERALIZED NODULES OF THE RIGHT LUNG, LIKELY REPRESENTING GRANULOMAS. CT CHEST W CONTRAST   5/6/2016   FINDINGS: Cardiac size is normal. Atherosclerotic calcifications are present in the thoracic aorta without evidence of aneurysm. There is no pleural or pericardial effusion. A 2 mm noncalcified nodule at the right lung apex is stable (image 11). A 4 mm noncalcified right lower lobe nodule is also stable (image 38). There are stable calcified granulomas at the right lung base (image 50). Calcified mediastinal and right hilar lymph nodes are present. There is no left hilar or axillary lymphadenopathy. Degenerative changes are seen in the thoracic spine without evidence of aggressive osseous lesions. A 1.5 cm hypoattenuating structure in the right hepatic lobe is stable (image 7). There are calcified granulomas in the spleen. Atherosclerotic calcifications are present in the upper abdominal aorta. The gallbladder is surgically absent. IMPRESSION:   1. No acute intrathoracic findings. 2. Stable small right lung nodules, likely poorly calcified granulomas. There is evidence of prior granulomatous disease. 3. Stable hypoattenuating structure in the liver possibly a cyst or hemangioma. Assessment      Diagnosis Orders   1. Mucopurulent chronic bronchitis ([address]  CT CHEST WO CONTRAST   2. Personal history of smoking     3. Alpha-1-antitrypsin deficiency carrier     4. Abnormal CT scan of lung  CT CHEST WO CONTRAST       A1A M/S serum level 131 no intervention needed  Plan   -Continue with anoro explained take daily in AM 1 puff  -Discussed albuterol inhaler use. Reviewed signs and symptoms indicating need for use including Shortness of breath and wheezing.  Discussed with patient the importance of using inhaler within the prescribed time frames. Patient verbalized understanding to use within prescribed time frame.  Patient also verbalized understanding that if they experience no relief after using albuterol and resting for 15 minutes they need to go to nearest ER or call 911.  - Reviewed CT chest  Noted increased RLL lung nodule will follow-up with CT chest in 6 months  -Doing well on nicorette advised to continue using for cravings  -Advised to maintain pneumonia vaccine with PCP and to take flu vaccine this coming season.  -Advised patient to call office with any changes, questions, or concerns regarding respiratory status    Will see Rivera De La Torre back in: 6 months with CT chest     Gracie Hassan CNP  11/18/2020

## 2021-01-14 DIAGNOSIS — F41.9 ANXIETY: ICD-10-CM

## 2021-01-14 NOTE — TELEPHONE ENCOUNTER
Pt would like a refill on the diazepam (VALIUM) 5 MG tablet [[id number]]    Please send to [facility] on [address]

## 2021-01-14 NOTE — TELEPHONE ENCOUNTER
Rx last filled on 11/16/20, #30/NR. Last seen 8/17/20, no future appt scheduled. Order pended and set to escribe. Due for appt?

## 2021-01-15 RX ORDER — DIAZEPAM 5 MG/1
5 TABLET ORAL EVERY 8 HOURS PRN
Qty: 30 TABLET | Refills: 0 | Status: SHIPPED | OUTPATIENT
Start: 2021-01-15 | End: 2021-03-10 | Stop reason: SDUPTHER

## 2021-02-12 ENCOUNTER — OFFICE VISIT (OUTPATIENT)
Dept: FAMILY MEDICINE CLINIC | Age: 71
End: 2021-02-12
Payer: MEDICARE

## 2021-02-12 VITALS
DIASTOLIC BLOOD PRESSURE: 60 MMHG | RESPIRATION RATE: 16 BRPM | WEIGHT: 148 LBS | HEART RATE: 92 BPM | BODY MASS INDEX: 24.63 KG/M2 | TEMPERATURE: 97.3 F | SYSTOLIC BLOOD PRESSURE: 118 MMHG

## 2021-02-12 DIAGNOSIS — H81.10 VERTIGO, BENIGN PAROXYSMAL, UNSPECIFIED LATERALITY: ICD-10-CM

## 2021-02-12 DIAGNOSIS — R00.2 PALPITATION: ICD-10-CM

## 2021-02-12 DIAGNOSIS — E78.2 MIXED HYPERLIPIDEMIA: ICD-10-CM

## 2021-02-12 DIAGNOSIS — R42 POSTURAL DIZZINESS: Primary | ICD-10-CM

## 2021-02-12 PROCEDURE — 99214 OFFICE O/P EST MOD 30 MIN: CPT | Performed by: NURSE PRACTITIONER

## 2021-02-12 PROCEDURE — 93000 ELECTROCARDIOGRAM COMPLETE: CPT | Performed by: NURSE PRACTITIONER

## 2021-02-12 RX ORDER — MECLIZINE HCL 12.5 MG/1
12.5 TABLET ORAL 3 TIMES DAILY PRN
Qty: 30 TABLET | Refills: 0 | Status: SHIPPED | OUTPATIENT
Start: 2021-02-12 | End: 2021-03-10 | Stop reason: SDUPTHER

## 2021-02-12 ASSESSMENT — ENCOUNTER SYMPTOMS
SWOLLEN GLANDS: 0
SINUS PAIN: 0
RHINORRHEA: 0
SORE THROAT: 0
NAUSEA: 0
VOMITING: 0
DIARRHEA: 0
COLOR CHANGE: 0
COUGH: 0
SHORTNESS OF BREATH: 0
ABDOMINAL PAIN: 0

## 2021-02-12 NOTE — PATIENT INSTRUCTIONS
or loss of movement in your face, arm, or leg, especially on only one side of your body. ? Sudden vision changes. ? Sudden trouble speaking. ? Sudden confusion or trouble understanding simple statements. ? Sudden problems with walking or balance. ? A sudden, severe headache that is different from past headaches. Call your doctor now or seek immediate medical care if:    · You have new or worse nausea and vomiting.     · You have new symptoms such as hearing loss or roaring in your ears. Watch closely for changes in your health, and be sure to contact your doctor if:    · You are not getting better as expected.     · Your vertigo gets worse. Where can you learn more? Go to https://[email or web address].[email or web address]. org and sign in to your Mindframe account. Enter  in the Carbonite box to learn more about \"Benign Paroxysmal Positional Vertigo (BPPV): Care Instructions. \"     If you do not have an account, please click on the \"Sign Up Now\" link. Current as of: April 15, 2020               Content Version: 12.6  © 6394-7917 [employer] Care instructions adapted under license by [facility] ([facility]). If you have questions about a medical condition or this instruction, always ask your healthcare professional. [place] [address] any warranty or liability for your use of this information. Patient Education        Dizziness: Care Instructions  Your Care Instructions  Dizziness is the feeling of unsteadiness or fuzziness in your head. It is different than having vertigo, which is a feeling that the room is spinning or that you are moving or falling. It is also different from lightheadedness, which is the feeling that you are about to faint. It can be hard to know what causes dizziness. Some people feel dizzy when they have migraine headaches. Sometimes bouts of flu can make you feel dizzy.  Some medical conditions, such as heart problems or high blood pressure, can make especially on only one side of your body. ? Sudden vision changes. ? Sudden trouble speaking. ? Sudden confusion or trouble understanding simple statements. ? Sudden problems with walking or balance. ? A sudden, severe headache that is different from past headaches. Call your doctor now or seek immediate medical care if:    · You feel dizzy and have a fever, headache, or ringing in your ears.     · You have new or increased nausea and vomiting.     · Your dizziness does not go away or comes back. Watch closely for changes in your health, and be sure to contact your doctor if:    · You do not get better as expected. Where can you learn more? Go to https://VisTrackspepiceweb.iMER. org and sign in to your Loladex account. Enter F934 in the Caliber Data box to learn more about \"Dizziness: Care Instructions. \"     If you do not have an account, please click on the \"Sign Up Now\" link. Current as of: June 26, 2019               Content Version: 12.6  © 6662-1054 [employer]. Care instructions adapted under license by [facility] ([facility]). If you have questions about a medical condition or this instruction, always ask your healthcare professional. [place] [address] any warranty or liability for your use of this information.

## 2021-02-12 NOTE — PROGRESS NOTES
[address] [address]  Dept: [phone]  Dept Fax: [phone]: [phone]     Visit Date:  2/12/2021      Patient:  Court Templeton  YOB: 1950    HPI:     Chief Complaint   Patient presents with    Dizziness     C/O dizziness since Tuesday night with fluctuating BP and fatigue. No nausea. No problems with vision. Pt presents to the office today for issues with episodic dizziness. Dizziness started after rolling over in bed. BP elevated at home. No dizziness yesterday, just a few seconds when going to bed. She reports that this is positional.  She has a HX of vertigo. Pt denies any chest pain, SOB or burred vision. Dizziness  This is a new problem. The current episode started in the past 7 days. The problem occurs intermittently. The problem has been waxing and waning. Associated symptoms include vertigo. Pertinent negatives include no abdominal pain, arthralgias, chest pain, chills, congestion, coughing, fatigue, fever, headaches, joint swelling, myalgias, nausea, neck pain, numbness, rash, sore throat, swollen glands, visual change, vomiting or weakness.      BP Readings from Last 3 Encounters:   02/12/21 118/60   11/18/20 122/78   10/28/20 126/70       Medications    Current Outpatient Medications:     VITAMIN D PO, Take by mouth daily, Disp: , Rfl:     meclizine (ANTIVERT) 12.5 MG tablet, Take 1 tablet by mouth 3 times daily as needed for Dizziness, Disp: 30 tablet, Rfl: 0    umeclidinium-vilanterol (ANORO ELLIPTA) 62.5-25 MCG/INH AEPB inhaler, Inhale 1 puff into the lungs daily, Disp: 60 each, Rfl: 5    b complex vitamins capsule, Take 1 capsule by mouth daily, Disp: , Rfl:     Probiotic Product (PROBIOTIC DAILY PO), Take by mouth daily , Disp: , Rfl:     PROAIR  (90 Base) MCG/ACT inhaler, Inhale 2 puffs into the lungs every 4 hours as needed for Wheezing, Disp: 1 Inhaler, Rfl: 2    Acetaminophen (TYLENOL PO), Take by mouth as needed, Disp: , Rfl:     Calcium-Magnesium (CALCIUM MAGNESIUM 750) 300-300 MG TABS, Take by mouth daily , Disp: , Rfl:     aspirin 81 MG EC tablet, Take 81 mg by mouth daily, Disp: , Rfl:     Multiple Vitamins-Minerals (MULTIVITAMIN PO), Take  by mouth daily. , Disp: , Rfl:     The patient is allergic to prednisone and sulfa antibiotics. Past Medical History  Adrienne Page  has a past medical history of Anxiety, Hyperlipidemia, Kidney stones, Osteoarthritis, Restless leg syndrome, Tobacco abuse, and Viral hepatitis A. Subjective:      Review of Systems   Constitutional: Negative for chills, fatigue and fever. HENT: Negative for congestion, rhinorrhea, sinus pain and sore throat. Respiratory: Negative for cough and shortness of breath. Cardiovascular: Positive for palpitations. Negative for chest pain and leg swelling. Gastrointestinal: Negative for abdominal pain, diarrhea, nausea and vomiting. Musculoskeletal: Negative for arthralgias, joint swelling, myalgias and neck pain. Skin: Negative for color change and rash. Neurological: Positive for dizziness, vertigo and light-headedness. Negative for weakness, numbness and headaches. Objective:     /60 (Site: Left Upper Arm)   Pulse 92   Temp 97.3 °F (36.3 °C)   Resp 16   Wt 148 lb (67.1 kg)   BMI 24.63 kg/m²     Physical Exam  Vitals signs reviewed. Constitutional:       General: She is not in acute distress. Appearance: Normal appearance. She is well-developed. HENT:      Head: Normocephalic and atraumatic. Right Ear: Hearing, tympanic membrane, ear canal and external ear normal.      Left Ear: Hearing, tympanic membrane, ear canal and external ear normal.      Nose: Nose normal. No nasal tenderness. Mouth/Throat:      Lips: Pink. Mouth: Mucous membranes are moist. No oral lesions. Pharynx: Oropharynx is clear. Uvula midline.    Eyes: General:         Right eye: No discharge. Left eye: No discharge. Extraocular Movements: Extraocular movements intact. Conjunctiva/sclera: Conjunctivae normal.      Pupils: Pupils are equal, round, and reactive to light. Neck:      Musculoskeletal: Full passive range of motion without pain and normal range of motion. Vascular: No carotid bruit. Trachea: No tracheal deviation. Cardiovascular:      Rate and Rhythm: Normal rate and regular rhythm. Pulses: Normal pulses. Heart sounds: Normal heart sounds. No murmur. Pulmonary:      Effort: Pulmonary effort is normal. No respiratory distress. Breath sounds: Normal breath sounds. Abdominal:      General: Bowel sounds are normal.      Palpations: Abdomen is soft. Tenderness: There is no abdominal tenderness. There is no right CVA tenderness or left CVA tenderness. Musculoskeletal:      Right lower leg: No edema. Left lower leg: No edema. Lymphadenopathy:      Head:      Right side of head: No submental, submandibular, tonsillar, preauricular, posterior auricular or occipital adenopathy. Left side of head: No submental, submandibular, tonsillar, preauricular, posterior auricular or occipital adenopathy. Cervical: No cervical adenopathy. Skin:     General: Skin is warm and dry. Findings: No rash. Neurological:      General: No focal deficit present. Mental Status: She is alert and oriented to person, place, and time. GCS: GCS eye subscore is 4. GCS verbal subscore is 5. GCS motor subscore is 6. Cranial Nerves: Cranial nerves are intact. Motor: Motor function is intact. Coordination: Coordination is intact. Coordination normal.      Comments: + YESIKA-Halpike to the left. Psychiatric:         Mood and Affect: Mood normal.         Behavior: Behavior normal.         Thought Content:  Thought content normal.         Judgment: Judgment normal.         Assessment/Plan:

## 2021-02-14 LAB
ABSOLUTE BASO #: 0.1 X10E9/L (ref 0–0.2)
ABSOLUTE EOS #: 0.2 X10E9/L (ref 0–0.4)
ABSOLUTE LYMPH #: 2.6 X10E9/L (ref 1–3.5)
ABSOLUTE MONO #: 0.4 X10E9/L (ref 0–0.9)
ABSOLUTE NEUT #: 2.7 X10E9/L (ref 1.5–6.6)
ALBUMIN SERPL-MCNC: 4.1 G/DL (ref 3.2–5.3)
ALK PHOSPHATASE: 105 U/L (ref 39–130)
ALT SERPL-CCNC: 19 U/L (ref 0–31)
ANION GAP SERPL CALCULATED.3IONS-SCNC: 6 MMOL/L (ref 5–15)
AST SERPL-CCNC: 21 U/L (ref 0–41)
BASOPHILS RELATIVE PERCENT: 1.2 %
BILIRUB SERPL-MCNC: 0.6 MG/DL (ref 0.3–1.2)
BUN BLDV-MCNC: 15 MG/DL (ref 5–27)
CALCIUM SERPL-MCNC: 9.8 MG/DL (ref 8.5–10.5)
CHLORIDE BLD-SCNC: 109 MMOL/L (ref 98–109)
CHOLESTEROL/HDL RATIO: 3.8 (ref 1–5)
CHOLESTEROL: 233 MG/DL (ref 150–200)
CO2: 29 MMOL/L (ref 22–32)
CREAT SERPL-MCNC: 0.85 MG/DL (ref 0.4–1)
EGFR AFRICAN AMERICAN: >60 ML/MIN/1.73SQ.M
EGFR IF NONAFRICAN AMERICAN: >60 ML/MIN/1.73SQ.M
EOSINOPHILS RELATIVE PERCENT: 2.9 %
GLUCOSE: 96 MG/DL (ref 65–99)
HCT VFR BLD CALC: 46.3 % (ref 35–47)
HDLC SERPL-MCNC: 61 MG/DL
HEMOGLOBIN: 15.1 G/DL (ref 11.7–15.5)
LDL CHOLESTEROL CALCULATED: 143 MG/DL
LDL/HDL RATIO: 2.3
LYMPHOCYTE %: 43.4 %
MCH RBC QN AUTO: 31.3 PG (ref 27–34)
MCHC RBC AUTO-ENTMCNC: 32.5 G/DL (ref 32–36)
MCV RBC AUTO: 96 FL (ref 80–100)
MONOCYTES # BLD: 7.6 %
NEUTROPHILS RELATIVE PERCENT: 44.9 %
PDW BLD-RTO: 14.6 % (ref 11.5–15)
PLATELETS: 233 X10E9/L (ref 150–450)
PMV BLD AUTO: 8.4 FL (ref 7–12)
POTASSIUM SERPL-SCNC: 5.4 MMOL/L (ref 3.5–5)
RBC: 4.81 X10E12/L (ref 3.8–5.2)
SODIUM BLD-SCNC: 144 MMOL/L (ref 134–146)
T4 FREE: 0.95 NG/DL (ref 0.61–1.6)
TOTAL PROTEIN: 6.5 G/DL (ref 6–8)
TRIGL SERPL-MCNC: 144 MG/DL (ref 27–150)
TSH SERPL DL<=0.05 MIU/L-ACNC: 2.71 UIU/ML (ref 0.49–4.67)
VLDLC SERPL CALC-MCNC: 29 MG/DL (ref 0–30)
WBC: 5.9 X10E9/L (ref 4–11)

## 2021-02-15 ENCOUNTER — TELEPHONE (OUTPATIENT)
Dept: FAMILY MEDICINE CLINIC | Age: 71
End: 2021-02-15

## 2021-02-15 ASSESSMENT — ENCOUNTER SYMPTOMS: VISUAL CHANGE: 0

## 2021-02-15 NOTE — TELEPHONE ENCOUNTER
Pt notified of the lab results. She states she is feeling a tad better but still doesn't \"feel right\". Taking Meclizine but isn't sure it is helping. Pt will call Dr. Nataliia Headley office to see if he wants to reevaluate her and possibley reschedule the stress test she missed last year.

## 2021-03-10 DIAGNOSIS — H81.10 VERTIGO, BENIGN PAROXYSMAL, UNSPECIFIED LATERALITY: ICD-10-CM

## 2021-03-10 DIAGNOSIS — F41.9 ANXIETY: ICD-10-CM

## 2021-03-10 RX ORDER — MECLIZINE HCL 12.5 MG/1
12.5 TABLET ORAL 3 TIMES DAILY PRN
Qty: 30 TABLET | Refills: 0 | Status: SHIPPED | OUTPATIENT
Start: 2021-03-10 | End: 2021-03-20

## 2021-03-10 RX ORDER — DIAZEPAM 5 MG/1
5 TABLET ORAL EVERY 12 HOURS PRN
Qty: 30 TABLET | Refills: 0 | Status: SHIPPED | OUTPATIENT
Start: 2021-03-10 | End: 2021-04-09

## 2021-03-10 NOTE — TELEPHONE ENCOUNTER
This medication refill is regarding a telephone request.  Refill requested by patient. Requested Prescriptions     Pending Prescriptions Disp Refills    diazePAM (VALIUM) 5 MG tablet 30 tablet 0     Sig: Take 1 tablet by mouth every 8 hours as needed for Anxiety or Sleep for up to 30 days.  meclizine (ANTIVERT) 12.5 MG tablet 30 tablet 0     Sig: Take 1 tablet by mouth 3 times daily as needed for Dizziness       Date of last visit: 2/12/2021  Date of next visit: None  Date of last refill: Diazepam 1/15/2021  30/0  Meclizine 2/12/2021  30/0    Rx verified, ordered and set to EP.

## 2021-04-22 ENCOUNTER — OFFICE VISIT (OUTPATIENT)
Dept: CARDIOLOGY CLINIC | Age: 71
End: 2021-04-22
Payer: MEDICARE

## 2021-04-22 VITALS
WEIGHT: 148 LBS | HEIGHT: 65 IN | HEART RATE: 96 BPM | BODY MASS INDEX: 24.66 KG/M2 | SYSTOLIC BLOOD PRESSURE: 172 MMHG | DIASTOLIC BLOOD PRESSURE: 86 MMHG

## 2021-04-22 DIAGNOSIS — R42 DIZZINESS: ICD-10-CM

## 2021-04-22 DIAGNOSIS — F17.200 SMOKING: ICD-10-CM

## 2021-04-22 DIAGNOSIS — R06.02 SOB (SHORTNESS OF BREATH): ICD-10-CM

## 2021-04-22 DIAGNOSIS — I10 ESSENTIAL HYPERTENSION: Primary | ICD-10-CM

## 2021-04-22 DIAGNOSIS — I44.7 LBBB (LEFT BUNDLE BRANCH BLOCK): ICD-10-CM

## 2021-04-22 PROCEDURE — 99214 OFFICE O/P EST MOD 30 MIN: CPT | Performed by: NUCLEAR MEDICINE

## 2021-04-22 RX ORDER — DIAZEPAM 5 MG/1
5 TABLET ORAL EVERY 6 HOURS PRN
COMMUNITY
End: 2021-06-23 | Stop reason: SDUPTHER

## 2021-04-22 NOTE — PROGRESS NOTES
[facility] [facility]  [address]  [facility] [address]  Dept: [phone]  Dept Fax: [phone]  Loc: [phone]    Visit Date: 4/22/2021    Lina Rivera is a 79 y.o. female who presents todayfor:  Chief Complaint   Patient presents with    Check-Up    Hypertension    Shortness of Breath     Had some more dizziness  Usually when she lays flat  No syncope  Not vertigo   Known LBBB  Some dyspnea on exertion   No more smoking   She skipped her stress test last year   Echo was gilson   Higher BP lately   No meds  No known CAD  Does have a lesion on the vocal cord  No obvious chest pain       HPI:  HPI  Past Medical History:   Diagnosis Date    Anxiety     Hyperlipidemia     Kidney stones     Osteoarthritis     Restless leg syndrome 2010    Tobacco abuse     Viral hepatitis A 1992      Past Surgical History:   Procedure Laterality Date    CHOLECYSTECTOMY  1983    COLONOSCOPY      ERCP      HYSTERECTOMY  1981    Right ovary intact    OVARIAN CYST REMOVAL Right 1985    POLYPECTOMY  08/26/2016    ROTATOR CUFF REPAIR Left 05/19/2017    Dr. Yuki Head  Age 5    UPPER GASTROINTESTINAL ENDOSCOPY       Family History   Problem Relation Age of Onset    Diabetes Mother         Type 2    Kidney Disease Mother     High Cholesterol Mother     High Blood Pressure Mother     Heart Disease Mother     Cancer Father         Liver cancer    High Blood Pressure Father     Cancer Brother         Liver cancer    Heart Attack Maternal Grandmother     Heart Disease Maternal Grandmother     High Blood Pressure Maternal Grandmother     Parkinsonism Maternal Grandfather     Heart Attack Paternal Grandmother     High Blood Pressure Sister     High Cholesterol Sister     Diabetes Brother     Coronary Art Dis Brother     Stroke Brother     No Known Problems Brother     No Known Problems Brother     No Known Problems Sister      Social History     Tobacco Use    Smoking status: Former Smoker     Packs/day: 0.75     Years: 49.00     Pack years: 36.75     Types: Cigarettes     Quit date: 10/21/2017     Years since quitting: 3.5    Smokeless tobacco: Never Used   Substance Use Topics    Alcohol use: No      Current Outpatient Medications   Medication Sig Dispense Refill    diazePAM (VALIUM) 5 MG tablet Take 5 mg by mouth every 6 hours as needed for Anxiety.  VITAMIN D PO Take by mouth daily      umeclidinium-vilanterol (ANORO ELLIPTA) 62.5-25 MCG/INH AEPB inhaler Inhale 1 puff into the lungs daily 60 each 5    b complex vitamins capsule Take 1 capsule by mouth daily      Probiotic Product (PROBIOTIC DAILY PO) Take by mouth daily       PROAIR  (90 Base) MCG/ACT inhaler Inhale 2 puffs into the lungs every 4 hours as needed for Wheezing 1 Inhaler 2    Acetaminophen (TYLENOL PO) Take by mouth as needed      Calcium-Magnesium (CALCIUM MAGNESIUM 750) 300-300 MG TABS Take by mouth daily       aspirin 81 MG EC tablet Take 81 mg by mouth daily      Multiple Vitamins-Minerals (MULTIVITAMIN PO) Take  by mouth daily. No current facility-administered medications for this visit.       Allergies   Allergen Reactions    Prednisone Swelling     Tongue swelling    Sulfa Antibiotics      Flank pain     Health Maintenance   Topic Date Due    DTaP/Tdap/Td vaccine (1 - Tdap) Never done    Pneumococcal 65+ years Vaccine (1 of 1 - PPSV23) Never done    Breast cancer screen  07/18/2018    Annual Wellness Visit (AWV)  Never done    Colon cancer screen colonoscopy  02/15/2020    Shingles Vaccine (2 of 2) 03/24/2021    Flu vaccine (Season Ended) 09/01/2021    Low dose CT lung screening  11/04/2021    Lipid screen  02/13/2026    DEXA (modify frequency per FRAX score)  Completed    COVID-19 Vaccine  Completed    Hepatitis C screen  Completed    Hepatitis A vaccine  Aged Out    Hepatitis B vaccine  Aged

## 2021-05-06 ENCOUNTER — HOSPITAL ENCOUNTER (OUTPATIENT)
Dept: NON INVASIVE DIAGNOSTICS | Age: 71
Discharge: HOME | End: 2021-05-06
Payer: MEDICARE

## 2021-05-06 DIAGNOSIS — F17.200 SMOKING: ICD-10-CM

## 2021-05-06 DIAGNOSIS — I44.7 LBBB (LEFT BUNDLE BRANCH BLOCK): ICD-10-CM

## 2021-05-06 DIAGNOSIS — R42 DIZZINESS: ICD-10-CM

## 2021-05-06 DIAGNOSIS — R06.02 SOB (SHORTNESS OF BREATH): ICD-10-CM

## 2021-05-06 DIAGNOSIS — I10 ESSENTIAL HYPERTENSION: ICD-10-CM

## 2021-05-06 PROCEDURE — 78452 HT MUSCLE IMAGE SPECT MULT: CPT

## 2021-05-06 PROCEDURE — 6360000002 HC RX W HCPCS

## 2021-05-06 PROCEDURE — 93246 EXT ECG>7D<15D RECORDING: CPT

## 2021-05-06 PROCEDURE — 3430000000 HC RX DIAGNOSTIC RADIOPHARMACEUTICAL: Performed by: NUCLEAR MEDICINE

## 2021-05-06 PROCEDURE — A9500 TC99M SESTAMIBI: HCPCS | Performed by: NUCLEAR MEDICINE

## 2021-05-06 PROCEDURE — 93017 CV STRESS TEST TRACING ONLY: CPT | Performed by: NUCLEAR MEDICINE

## 2021-05-06 RX ADMIN — Medication 8.3 MILLICURIE: at 11:10

## 2021-05-06 RX ADMIN — Medication 29.7 MILLICURIE: at 12:30

## 2021-05-06 NOTE — PROCEDURES
Continuous Cardiac Monitor applied [id number]. Detailed instructions given and patient verbalized understanding.

## 2021-05-13 ENCOUNTER — TELEPHONE (OUTPATIENT)
Dept: NEUROLOGY | Age: 71
End: 2021-05-13

## 2021-05-13 ENCOUNTER — HOSPITAL ENCOUNTER (OUTPATIENT)
Dept: CT IMAGING | Age: 71
Discharge: HOME | End: 2021-05-13
Payer: MEDICARE

## 2021-05-13 DIAGNOSIS — R91.8 ABNORMAL CT SCAN OF LUNG: ICD-10-CM

## 2021-05-13 DIAGNOSIS — J41.1 MUCOPURULENT CHRONIC BRONCHITIS (HCC): ICD-10-CM

## 2021-05-13 PROCEDURE — 71250 CT THORAX DX C-: CPT

## 2021-05-24 ENCOUNTER — OFFICE VISIT (OUTPATIENT)
Dept: PULMONOLOGY | Age: 71
End: 2021-05-24
Payer: MEDICARE

## 2021-05-24 VITALS
OXYGEN SATURATION: 98 % | BODY MASS INDEX: 25.29 KG/M2 | HEART RATE: 68 BPM | WEIGHT: 151.8 LBS | SYSTOLIC BLOOD PRESSURE: 124 MMHG | TEMPERATURE: 98 F | DIASTOLIC BLOOD PRESSURE: 70 MMHG | HEIGHT: 65 IN

## 2021-05-24 DIAGNOSIS — Z87.891 PERSONAL HISTORY OF SMOKING: ICD-10-CM

## 2021-05-24 DIAGNOSIS — J43.2 CENTRILOBULAR EMPHYSEMA (HCC): Primary | ICD-10-CM

## 2021-05-24 DIAGNOSIS — Z87.891 PERSONAL HISTORY OF TOBACCO USE: ICD-10-CM

## 2021-05-24 PROCEDURE — 99214 OFFICE O/P EST MOD 30 MIN: CPT | Performed by: NURSE PRACTITIONER

## 2021-05-24 PROCEDURE — G0296 VISIT TO DETERM LDCT ELIG: HCPCS | Performed by: NURSE PRACTITIONER

## 2021-05-24 ASSESSMENT — ENCOUNTER SYMPTOMS
WHEEZING: 0
STRIDOR: 0
VOMITING: 0
SHORTNESS OF BREATH: 1
NAUSEA: 0
SORE THROAT: 0
CHEST TIGHTNESS: 0
COUGH: 0
DIARRHEA: 0

## 2021-05-24 ASSESSMENT — COPD QUESTIONNAIRES: COPD: 1

## 2021-05-24 NOTE — PROGRESS NOTES
[facility]    Patient: Griffin Sanderson, 79 y.o.   : 1950    Pt of Dr. Amaris Garcia   Patient presents with    Follow-up     6 month follow up with CT chest on 2021        COPD  She complains of shortness of breath. There is no cough or wheezing. This is a chronic problem. The current episode started more than 1 year ago. The problem occurs daily. The problem has been gradually improving. Pertinent negatives include no chest pain, dyspnea on exertion, fever or sore throat. Exacerbated by: Dust. Her symptoms are alleviated by beta-agonist and rest. She reports significant improvement on treatment. Risk factors for lung disease include smoking/tobacco exposure. Boston Mora is here for follow up for COPD/emphysema. PMH significant for Anxiety, kidney stones, OA, RLS, recently diagnosed with vocal cord polyp that has since been removed by ENT at [address] [place] [address] on 2021 reported to patient as non-cancerous. Overall patient reports respiratory symptoms have been improved since last appointment. Patient reports good compliance with inhaled medications (Anoro). Patient using albuterol 1-2  times per month on average. Patient reports good physical limitation due to respiratory symptoms. Progress History:   Since last visit any new medical issues? Yes had polyp removed tolerated well informed noncancerous  New ER or hospital visits? No  Any new or changes in medicines? No  Using inhalers? Yes Anoro  Are they helpful?  Yes      Past Medical hx   PMH:  Past Medical History:   Diagnosis Date    Anxiety     Hyperlipidemia     Kidney stones     Osteoarthritis     Restless leg syndrome     Tobacco abuse     Viral hepatitis A      SURGICAL HISTORY:  Past Surgical History:   Procedure Laterality Date    CHOLECYSTECTOMY      COLONOSCOPY      ERCP      HYSTERECTOMY      Right ovary intact    OVARIAN CYST REMOVAL Right 1985    POLYPECTOMY  08/26/2016    ROTATOR CUFF REPAIR Left 05/19/2017    Dr. Les Hernandez  Age 5    UPPER GASTROINTESTINAL ENDOSCOPY       SOCIAL HISTORY:  Social History     Tobacco Use    Smoking status: Former Smoker     Packs/day: 0.75     Years: 49.00     Pack years: 36.75     Types: Cigarettes     Quit date: 10/21/2017     Years since quitting: 3.5    Smokeless tobacco: Never Used   Vaping Use    Vaping Use: Never used   Substance Use Topics    Alcohol use: No    Drug use: No     ALLERGIES:  Allergies   Allergen Reactions    Prednisone Swelling     Tongue swelling    Sulfa Antibiotics      Flank pain     FAMILY HISTORY:  Family History   Problem Relation Age of Onset    Diabetes Mother         Type 2    Kidney Disease Mother     High Cholesterol Mother     High Blood Pressure Mother     Heart Disease Mother     Cancer Father         Liver cancer    High Blood Pressure Father     Cancer Brother         Liver cancer    Heart Attack Maternal Grandmother     Heart Disease Maternal Grandmother     High Blood Pressure Maternal Grandmother     Parkinsonism Maternal Grandfather     Heart Attack Paternal Grandmother     High Blood Pressure Sister     High Cholesterol Sister     Diabetes Brother     Coronary Art Dis Brother     Stroke Brother     No Known Problems Brother     No Known Problems Brother     No Known Problems Sister      CURRENT MEDICATIONS:  Current Outpatient Medications   Medication Sig Dispense Refill    diazePAM (VALIUM) 5 MG tablet Take 5 mg by mouth every 6 hours as needed for Anxiety.       VITAMIN D PO Take by mouth daily      umeclidinium-vilanterol (ANORO ELLIPTA) 62.5-25 MCG/INH AEPB inhaler Inhale 1 puff into the lungs daily 60 each 5    b complex vitamins capsule Take 1 capsule by mouth daily      Probiotic Product (PROBIOTIC DAILY PO) Take by mouth daily       PROAIR  (90 Base) MCG/ACT inhaler Inhale 2 puffs into the lungs every 4 hours as needed for Wheezing 1 Inhaler 2    Acetaminophen (TYLENOL PO) Take by mouth as needed      Calcium-Magnesium (CALCIUM MAGNESIUM 750) 300-300 MG TABS Take by mouth daily       aspirin 81 MG EC tablet Take 81 mg by mouth daily      Multiple Vitamins-Minerals (MULTIVITAMIN PO) Take  by mouth daily. No current facility-administered medications for this visit. Becky DURAND   Review of Systems   Constitutional: Negative for chills, fever and unexpected weight change. HENT: Negative for sore throat. Respiratory: Positive for shortness of breath. Negative for cough, chest tightness, wheezing and stridor. Cardiovascular: Negative for chest pain, dyspnea on exertion and leg swelling. Gastrointestinal: Negative for diarrhea, nausea and vomiting. Genitourinary: Negative for dysuria. Physical exam   /70 (Site: Left Upper Arm, Position: Sitting, Cuff Size: Medium Adult)   Pulse 68   Temp 98 °F (36.7 °C)   Ht 5' 5\" (1.651 m)   Wt 151 lb 12.8 oz (68.9 kg)   SpO2 98% Comment: on room at rest  BMI 25.26 kg/m²    Wt Readings from Last 3 Encounters:   05/24/21 151 lb 12.8 oz (68.9 kg)   04/22/21 148 lb (67.1 kg)   02/12/21 148 lb (67.1 kg)       Physical Exam  Vitals and nursing note reviewed. Constitutional:       General: She is not in acute distress. Appearance: She is well-developed. HENT:      Head: Normocephalic and atraumatic. Neck:      Trachea: No tracheal deviation. Cardiovascular:      Rate and Rhythm: Normal rate and regular rhythm. Heart sounds: Normal heart sounds. No murmur heard. Pulmonary:      Effort: Pulmonary effort is normal. No respiratory distress. Breath sounds: Normal breath sounds. No stridor. No wheezing or rales. Chest:      Chest wall: No tenderness. Abdominal:      General: Bowel sounds are normal. There is no distension. Palpations: Abdomen is soft. Musculoskeletal:      Cervical back: Neck supple. Skin:     General: Skin is warm and dry. Capillary Refill: Capillary refill takes less than 2 seconds. Neurological:      Mental Status: She is alert and oriented to person, place, and time. Psychiatric:         Behavior: Behavior normal.         Thought Content: Thought content normal.          Results   Lung Nodule Screening     [x] Qualifies    [] Does not qualify   [] Declined    [] Completed  Last CT May 2020   The USPSTF recommends annual screening for lung cancer with low-dose computed tomography (LDCT) in adults aged 48 to [de-identified] years who have a 20 pack-year smoking history and currently smoke or have quit within the past 15 years. Screening should be discontinued once a person has not smoked for 15 years or develops a health problem that substantially limits life expectancy or the ability or willingness to have curative lung surgery. CT CHEST WO CONTRAST   5/13/2021   FINDINGS:  Lungs: There is a 6 mm noncalcified nodule within the right lower lobe, stable from prior examination in November 2020 (series 2 image 35). There is a 4 mm noncalcified nodule in the right lower lobe adjacent to the hemidiaphragm, stable from 2017 (series 2 image 46). No additional discrete pulmonary nodules are identified. There are mild centrilobular emphysematous changes in both lungs. Lungs are clear of focal infiltrate. There is no pleural effusion. There is no pneumothorax. Proximal tracheobronchial tree is widely patent. Mediastinum and ezekiel: There is a 5 mm nodule within the right breast tissue, stable from 2017 (series 2 image 20). Recommend mammographic correlation. There is no axillary or mediastinal lymphadenopathy. No definite hilar lymphadenopathy on this noncontrast enhanced examination. There are calcified mediastinal and right hilar lymph nodes, relating to remote granulomatous disease. Heart size is normal. There is no pericardial effusion. There is atherosclerotic calcification of the thoracic aorta. tobacco use  VT VISIT TO DISCUSS LUNG CA SCREEN W LDCT    CT Lung Screen (Annual)         Plan   -Continue Anoro  -Albuterol 2 puff Q6 hrs PRN for SOB/wheezing   -Discussed albuterol inhaler use. Reviewed signs and symptoms indicating need for use including Shortness of breath and wheezing. Discussed with patient the importance of using inhaler within the prescribed time frames. Patient verbalized understanding to use within prescribed time frame. Patient also verbalized understanding that if they experience no relief after using albuterol and resting for 15 minutes they need to go to nearest ER or call 911.  -Reviewed CT chest with patient lung nodule stable will resume LDCT lung screening annually  -Advised to maintain pneumonia vaccine with PCP and to take flu vaccine this coming season.  -Advised patient to call office with any changes, questions, or concerns regarding respiratory status    Will see Pato Sifuentes back in: 12 months with LDCT chest     [facility] CNP  5/24/2021       Low Dose CT (LDCT) Lung Screening criteria met   Age 55-77   Pack year smoking >30   Still smoking or less than 15 year since quit   No sign or symptoms of lung cancer   > 11 months since last LDCT     Risks and benefits of lung cancer screening with LDCT scans discussed:    Significance of positive screen - False-positive LDCT results often occur. 95% of all positive results do not lead to a diagnosis of cancer. Usually further imaging can resolve most false-positive results; however, some patients may require invasive procedures. Over diagnosis risk - 10% to 12% of screen-detected lung cancer cases are over diagnosed--that is, the cancer would not have been detected in the patient's lifetime without the screening.     Need for follow up screens annually to continue lung cancer screening effectiveness     Risks associated with radiation from annual LDCT- Radiation exposure is about the same as for a mammogram, which is about 1/3 of the annual background radiation exposure from everyday life. Starting screening at age 54 is not likely to increase cancer risk from radiation exposure. Patients with comorbidities resulting in life expectancy of < 10 years, or that would preclude treatment of an abnormality identified on CT, should not be screened due to lack of benefit.     To obtain maximal benefit from this screening, smoking cessation and long-term abstinence from smoking is critical

## 2021-05-29 NOTE — PROCEDURES
[address] [place] [address]                                 EVENT MONITOR    PATIENT NAME: Danilo Robbins               :        1950  MED REC NO:   [id number]                           ROOM:  ACCOUNT NO:   [de-identified]                           ADMIT DATE: 2021  PROVIDER:     Lorraine Adam M.D. CLINICAL HISTORY AND INDICATION:  This is a patient with palpitations  and dizziness. EVENT MONITOR DESCRIPTION:  Event monitor was attached to the patient  between 2021 and 2021. EVENT MONITOR FINDINGS:  Baseline rhythm showed sinus rhythm with  occasional premature atrial beats and premature ventricular beats. There was no evidence of sustained arrhythmias, no pauses. CONCLUSION:  1. Sinus rhythm. 2.  Rare to occasional PACs and PVCs. 3.  Otherwise no sustained arrhythmias to explain the patient's  symptoms.         Carolina Saravia M.D.    D: 2021 15:33:47       T: 2021 16:22:40     [email or web address]  Job#: [id number]     Doc#: [id number]    CC:

## 2021-06-23 ENCOUNTER — INITIAL CONSULT (OUTPATIENT)
Dept: NEUROLOGY | Age: 71
End: 2021-06-23
Payer: MEDICARE

## 2021-06-23 VITALS
DIASTOLIC BLOOD PRESSURE: 82 MMHG | WEIGHT: 153 LBS | BODY MASS INDEX: 25.49 KG/M2 | HEIGHT: 65 IN | SYSTOLIC BLOOD PRESSURE: 130 MMHG | OXYGEN SATURATION: 98 % | HEART RATE: 76 BPM

## 2021-06-23 DIAGNOSIS — F41.9 ANXIETY: Primary | ICD-10-CM

## 2021-06-23 DIAGNOSIS — R42 DIZZINESS: Primary | ICD-10-CM

## 2021-06-23 PROCEDURE — 99204 OFFICE O/P NEW MOD 45 MIN: CPT | Performed by: PSYCHIATRY & NEUROLOGY

## 2021-06-23 RX ORDER — IBUPROFEN 400 MG/1
800 TABLET ORAL EVERY 6 HOURS PRN
COMMUNITY
Start: 2021-04-23

## 2021-06-23 NOTE — PATIENT INSTRUCTIONS
1. MRI brain WO contrast  2. CTA head and neck W/WO contrast  3. Vitamin B12, folate  4. Vitamin B6 level  5. Call with any new symptoms or concerns. 6. Follow up in 1 month.

## 2021-06-23 NOTE — TELEPHONE ENCOUNTER
This medication refill is regarding a telephone request.  Refill requested by patient. Pt will check with pharmacy, only need to call if there is an issue. Requested Prescriptions     Pending Prescriptions Disp Refills    diazePAM (VALIUM) 5 MG tablet       Sig: Take 1 tablet by mouth every 6 hours as needed for Anxiety. Date of last visit: 2/12/2021   Date of next visit: Visit date not found. (No visits with us)  Date of last refill: 3/10/21? [facility]        Rx verified, ordered and set to EP.

## 2021-06-24 LAB
FOLATE: >25 NG/ML
VITAMIN B-12: 428 PG/ML (ref 180–914)
VITAMIN B6: 162 NMOL/L (ref 20–125)

## 2021-06-24 RX ORDER — DIAZEPAM 5 MG/1
5 TABLET ORAL EVERY 6 HOURS PRN
Qty: 30 TABLET | Refills: 2 | Status: SHIPPED | OUTPATIENT
Start: 2021-06-24 | End: 2022-04-06 | Stop reason: SDUPTHER

## 2021-06-24 NOTE — TELEPHONE ENCOUNTER
OK for refill. -    Controlled Substance Monitoring:    Acute and Chronic Pain Monitoring:   RX Monitoring 6/24/2021   Attestation -   Periodic Controlled Substance Monitoring No signs of potential drug abuse or diversion identified.;Obtaining appropriate analgesic effect of treatment.
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
## 2021-06-30 ENCOUNTER — TELEPHONE (OUTPATIENT)
Dept: NEUROLOGY | Age: 71
End: 2021-06-30

## 2021-06-30 NOTE — TELEPHONE ENCOUNTER
----- Message from MARVEL Stone CNP sent at 6/30/2021  1:43 PM EDT -----  Please let patient know her vitamin B6 level is elevated=162. She needs to stop taking vitamin B6 supplements, as elevated vitamin B6 level can be neurotoxic.   Hector Jones, CNP

## 2021-07-16 DIAGNOSIS — R42 DIZZINESS: Primary | ICD-10-CM

## 2021-07-30 ENCOUNTER — HOSPITAL ENCOUNTER (OUTPATIENT)
Dept: MRI IMAGING | Age: 71
Discharge: HOME | End: 2021-07-30
Payer: MEDICARE

## 2021-07-30 ENCOUNTER — HOSPITAL ENCOUNTER (OUTPATIENT)
Dept: CT IMAGING | Age: 71
Discharge: HOME | End: 2021-07-30
Payer: MEDICARE

## 2021-07-30 DIAGNOSIS — R42 DIZZINESS: ICD-10-CM

## 2021-07-30 LAB — POC CREATININE WHOLE BLOOD: 1 MG/DL (ref 0.5–1.2)

## 2021-07-30 PROCEDURE — 70551 MRI BRAIN STEM W/O DYE: CPT

## 2021-07-30 PROCEDURE — 82565 ASSAY OF CREATININE: CPT

## 2021-07-30 PROCEDURE — 70496 CT ANGIOGRAPHY HEAD: CPT

## 2021-07-30 PROCEDURE — 6360000004 HC RX CONTRAST MEDICATION: Performed by: NURSE PRACTITIONER

## 2021-07-30 PROCEDURE — 70498 CT ANGIOGRAPHY NECK: CPT

## 2021-07-30 RX ADMIN — IOPAMIDOL 80 ML: 755 INJECTION, SOLUTION INTRAVENOUS at 14:39

## 2021-08-02 ENCOUNTER — TELEPHONE (OUTPATIENT)
Dept: NEUROLOGY | Age: 71
End: 2021-08-02

## 2021-08-02 DIAGNOSIS — R42 DIZZINESS: Primary | ICD-10-CM

## 2021-08-09 NOTE — TELEPHONE ENCOUNTER
Patient returned call requesting ENT referral to [facility]. Phone number [phone]. Referral placed.

## 2022-02-15 ENCOUNTER — HOSPITAL ENCOUNTER (OUTPATIENT)
Dept: GENERAL RADIOLOGY | Age: 72
Discharge: HOME | End: 2022-02-15
Payer: MEDICARE

## 2022-02-15 ENCOUNTER — TELEPHONE (OUTPATIENT)
Dept: FAMILY MEDICINE CLINIC | Age: 72
End: 2022-02-15

## 2022-02-15 ENCOUNTER — HOSPITAL ENCOUNTER (OUTPATIENT)
Age: 72
Discharge: HOME | End: 2022-02-15
Payer: MEDICARE

## 2022-02-15 ENCOUNTER — OFFICE VISIT (OUTPATIENT)
Dept: FAMILY MEDICINE CLINIC | Age: 72
End: 2022-02-15
Payer: MEDICARE

## 2022-02-15 VITALS
BODY MASS INDEX: 24.43 KG/M2 | HEART RATE: 84 BPM | RESPIRATION RATE: 12 BRPM | DIASTOLIC BLOOD PRESSURE: 76 MMHG | WEIGHT: 146.8 LBS | SYSTOLIC BLOOD PRESSURE: 138 MMHG

## 2022-02-15 DIAGNOSIS — R53.83 FATIGUE, UNSPECIFIED TYPE: Primary | ICD-10-CM

## 2022-02-15 DIAGNOSIS — R10.9 FLANK PAIN: ICD-10-CM

## 2022-02-15 DIAGNOSIS — R10.9 FLANK PAIN: Primary | ICD-10-CM

## 2022-02-15 DIAGNOSIS — Z87.442 HX OF RENAL CALCULI: ICD-10-CM

## 2022-02-15 DIAGNOSIS — E78.2 MIXED DYSLIPIDEMIA: ICD-10-CM

## 2022-02-15 LAB
BILIRUBIN URINE: NEGATIVE
BLOOD URINE, POC: NEGATIVE
CHARACTER, URINE: CLEAR
COLOR, URINE: YELLOW
GLUCOSE URINE: NEGATIVE MG/DL
KETONES, URINE: NEGATIVE
LEUKOCYTE CLUMPS, URINE: NEGATIVE
NITRITE, URINE: NEGATIVE
PH, URINE: 5 (ref 5–9)
PROTEIN, URINE: NEGATIVE MG/DL
SPECIFIC GRAVITY, URINE: 1.02 (ref 1–1.03)
UROBILINOGEN, URINE: 0.2 EU/DL (ref 0–1)

## 2022-02-15 PROCEDURE — 99213 OFFICE O/P EST LOW 20 MIN: CPT | Performed by: NURSE PRACTITIONER

## 2022-02-15 PROCEDURE — 74018 RADEX ABDOMEN 1 VIEW: CPT

## 2022-02-15 PROCEDURE — 81003 URINALYSIS AUTO W/O SCOPE: CPT | Performed by: NURSE PRACTITIONER

## 2022-02-15 SDOH — ECONOMIC STABILITY: FOOD INSECURITY: WITHIN THE PAST 12 MONTHS, YOU WORRIED THAT YOUR FOOD WOULD RUN OUT BEFORE YOU GOT MONEY TO BUY MORE.: NEVER TRUE

## 2022-02-15 SDOH — ECONOMIC STABILITY: FOOD INSECURITY: WITHIN THE PAST 12 MONTHS, THE FOOD YOU BOUGHT JUST DIDN'T LAST AND YOU DIDN'T HAVE MONEY TO GET MORE.: NEVER TRUE

## 2022-02-15 ASSESSMENT — PATIENT HEALTH QUESTIONNAIRE - PHQ9
2. FEELING DOWN, DEPRESSED OR HOPELESS: 0
1. LITTLE INTEREST OR PLEASURE IN DOING THINGS: 0
SUM OF ALL RESPONSES TO PHQ QUESTIONS 1-9: 0
SUM OF ALL RESPONSES TO PHQ9 QUESTIONS 1 & 2: 0

## 2022-02-15 ASSESSMENT — SOCIAL DETERMINANTS OF HEALTH (SDOH): HOW HARD IS IT FOR YOU TO PAY FOR THE VERY BASICS LIKE FOOD, HOUSING, MEDICAL CARE, AND HEATING?: NOT HARD AT ALL

## 2022-02-15 ASSESSMENT — ENCOUNTER SYMPTOMS
ABDOMINAL PAIN: 0
BOWEL INCONTINENCE: 0

## 2022-02-15 NOTE — PATIENT INSTRUCTIONS
Patient Education        Flank Pain: Care Instructions  Your Care Instructions  Flank pain is pain on the side of the back just below the rib cage and above the waist. It can be on one or both sides. Flank pain has many possible causes, including a kidney stone, a urinary tract infection, or back strain. Flank pain may get better on its own. But don't ignore new symptoms, such as fever, nausea and vomiting, urination problems, pain that gets worse, and dizziness. These may be signs of a more serious problem. You may have to have tests or other treatment. Follow-up care is a key part of your treatment and safety. Be sure to make and go to all appointments, and call your doctor if you are having problems. It's also a good idea to know your test results and keep a list of the medicines you take. How can you care for yourself at home? · Rest until you feel better. · Take pain medicines exactly as directed. ? If the doctor gave you a prescription medicine for pain, take it as prescribed. ? If you are not taking a prescription pain medicine, ask your doctor if you can take an over-the-counter pain medicine, such as acetaminophen (Tylenol), ibuprofen (Advil, Motrin), or naproxen (Aleve). Read and follow all instructions on the label. · If your doctor prescribed antibiotics, take them as directed. Do not stop taking them just because you feel better. You need to take the full course of antibiotics. · To apply heat, put a warm water bottle, a heating pad set on low, or a warm cloth on the painful area. Do not go to sleep with a heating pad on your skin. · To prevent dehydration, drink plenty of fluids. Choose water and other clear liquids until you feel better. If you have kidney, heart, or liver disease and have to limit fluids, talk with your doctor before you increase the amount of fluids you drink. When should you call for help?    Call your doctor now or seek immediate medical care if:    · Your flank pain gets worse.     · You have new symptoms, such as fever, nausea, or vomiting.     · You have symptoms of a urinary problem. For example:  ? You have blood or pus in your urine. ? You have chills or body aches. ? It hurts to urinate. ? You have groin or belly pain. Watch closely for changes in your health, and be sure to contact your doctor if you do not get better as expected. Where can you learn more? Go to [email or web address]. org and sign in to your [employer] account. Enter S191 in the [employer] box to learn more about \"Flank Pain: Care Instructions. \"     If you do not have an account, please click on the \"Sign Up Now\" link. Current as of: July 1, 2021               Content Version: 13.1  © 6258-6007 [employer]wise, Incorporated. Care instructions adapted under license by [facility] ([facility] [place]). If you have questions about a medical condition or this instruction, always ask your healthcare professional. [place] [address] any warranty or liability for your use of this information.

## 2022-02-15 NOTE — TELEPHONE ENCOUNTER
Pt complains of feeling tired and fatigued x several months. Pt is requesting a full work up of blood work. She had one last year around this time and wants to make sure she is not missing anything wrong with her health.

## 2022-02-15 NOTE — PROGRESS NOTES
[address] [facility] [address]  Dept: [phone]  Dept Fax: [phone]: [phone]     Visit Date:  2/15/2022      Patient:  Alejandro Robles  YOB: 1950    HPI:     Chief Complaint   Patient presents with    Flank Pain     started sunday, has gotten better since,        Pt presents to the office today for right flank pain that she had 2 days ago. The pain is gone now, but she is worried about a possible kidney stone. She has had stones in the past.  No pain with urination, no fever or chills. Sunday she did have nausea and chills with the pain. Flank Pain  This is a new problem. The current episode started in the past 7 days. The problem occurs intermittently. The problem has been gradually improving since onset. Pain location: right flank. The quality of the pain is described as aching. The pain does not radiate. The patient is experiencing no pain. Pertinent negatives include no abdominal pain, bladder incontinence, bowel incontinence, chest pain, dysuria, fever, headaches, paresis, paresthesias, pelvic pain, tingling or weakness. She has tried analgesics (increased fluids) for the symptoms. The treatment provided significant relief.        Medications    Current Outpatient Medications:     ibuprofen (ADVIL;MOTRIN) 400 MG tablet, Take 800 mg by mouth every 6 hours as needed, Disp: , Rfl:     VITAMIN D PO, Take by mouth daily, Disp: , Rfl:     umeclidinium-vilanterol (ANORO ELLIPTA) 62.5-25 MCG/INH AEPB inhaler, Inhale 1 puff into the lungs daily, Disp: 60 each, Rfl: 5    b complex vitamins capsule, Take 1 capsule by mouth daily, Disp: , Rfl:     Probiotic Product (PROBIOTIC DAILY PO), Take by mouth daily , Disp: , Rfl:     PROAIR  (90 Base) MCG/ACT inhaler, Inhale 2 puffs into the lungs every 4 hours as needed for Wheezing, Disp: 1 Inhaler, Rfl: 2    Acetaminophen (TYLENOL PO), Take by mouth as needed, Disp: , Rfl:     Calcium-Magnesium (CALCIUM MAGNESIUM 750) 300-300 MG TABS, Take by mouth daily , Disp: , Rfl:     aspirin 81 MG EC tablet, Take 81 mg by mouth daily, Disp: , Rfl:     Multiple Vitamins-Minerals (MULTIVITAMIN PO), Take  by mouth daily. , Disp: , Rfl:     The patient is allergic to prednisone and sulfa antibiotics. Past Medical History  Sukhjinder Hobbs  has a past medical history of Anxiety, Hyperlipidemia, Kidney stones, Osteoarthritis, Restless leg syndrome, Tobacco abuse, and Viral hepatitis A. Subjective:      Review of Systems   Constitutional: Negative for fever. Cardiovascular: Negative for chest pain. Gastrointestinal: Negative for abdominal pain and bowel incontinence. Genitourinary: Positive for flank pain. Negative for bladder incontinence, dysuria and pelvic pain. Neurological: Negative for tingling, weakness, headaches and paresthesias. Objective:     /76   Pulse 84   Resp 12   Wt 146 lb 12.8 oz (66.6 kg)   BMI 24.43 kg/m²     Physical Exam  Vitals reviewed. Constitutional:       General: She is not in acute distress. Appearance: Normal appearance. She is well-developed. She is not ill-appearing. HENT:      Head: Normocephalic and atraumatic. Right Ear: Hearing normal.      Left Ear: Hearing normal.      Nose: Nose normal. No nasal tenderness. Mouth/Throat:      Lips: Pink. Mouth: Mucous membranes are moist. No oral lesions. Pharynx: Oropharynx is clear. Uvula midline. Eyes:      General:         Right eye: No discharge. Left eye: No discharge. Conjunctiva/sclera: Conjunctivae normal.   Neck:      Trachea: No tracheal deviation. Cardiovascular:      Rate and Rhythm: Normal rate and regular rhythm. Heart sounds: Normal heart sounds. No murmur heard. Pulmonary:      Effort: Pulmonary effort is normal. No respiratory distress. Breath sounds: Normal breath sounds.    Abdominal:      General: Bowel sounds are normal.      Palpations: Abdomen is soft. Tenderness: There is no abdominal tenderness. There is no right CVA tenderness or left CVA tenderness. Musculoskeletal:      Cervical back: Full passive range of motion without pain. Thoracic back: Tenderness present. No swelling or lacerations. Normal range of motion. No scoliosis. Back:    Skin:     General: Skin is warm and dry. Findings: No rash. Neurological:      General: No focal deficit present. Mental Status: She is alert and oriented to person, place, and time. Coordination: Coordination normal.   Psychiatric:         Mood and Affect: Mood normal.         Behavior: Behavior normal.         Thought Content: Thought content normal.         Judgment: Judgment normal.           Results for POC orders placed in visit on 02/15/22   POCT Urinalysis No Micro (Auto)   Result Value Ref Range    Glucose, Ur Negative NEGATIVE mg/dl    Bilirubin Urine Negative     Ketones, Urine Negative NEGATIVE    Specific Gravity, Urine 1.025 1.002 - 1.030    Blood, UA POC Negative NEGATIVE    pH, Urine 5.00 5.0 - 9.0    Protein, Urine Negative NEGATIVE mg/dl    Urobilinogen, Urine 0.20 0.0 - 1.0 eu/dl    Nitrite, Urine Negative NEGATIVE    Leukocyte Clumps, Urine Negative NEGATIVE    Color, Urine Yellow YELLOW-STRAW    Character, Urine Clear [id number]       Assessment/Plan:      Sukhjinder Hobbs was seen today for flank pain. Diagnoses and all orders for this visit:    Flank pain  -     POCT Urinalysis No Micro (Auto)  -     Cancel: POCT Urinalysis No Micro (Auto)  -     XR ABDOMEN (KUB) (SINGLE AP VIEW); Future    Hx of renal calculi  -     XR ABDOMEN (KUB) (SINGLE AP VIEW); Future    - Urine in office is clear. We will get a KUB. If pain returns or symptoms change, pt to call office and CT scan or US will be ordered. Pt agreeable to plan. - OK for Motrin as needed for pain  - Increase clear fluids.      Return if symptoms worsen or fail to improve. Patient given educational materials - see patient instructions. Discussed use, benefit, and side effects of prescribed medications. All patient questions answered. Pt voiced understanding.         Electronically signed by MARVEL Farah CNP on 2/15/2022 at 11:40 AM

## 2022-02-16 NOTE — TELEPHONE ENCOUNTER
Noted.  Orders placed. Please schedule pt for an AWV and follow up in the next few weeks.   Thanks -WS  Orders Placed This Encounter   Procedures    TSH    T4, Free    Lipid Panel    Comprehensive Metabolic Panel    CBC with Auto Differential

## 2022-02-24 LAB
CHOLESTEROL, TOTAL: 231 MG/DL
CHOLESTEROL/HDL RATIO: NORMAL
HDLC SERPL-MCNC: 58 MG/DL (ref 35–70)
LDL CHOLESTEROL CALCULATED: 121 MG/DL (ref 0–160)
NONHDLC SERPL-MCNC: NORMAL MG/DL
TRIGL SERPL-MCNC: 262 MG/DL
VLDLC SERPL CALC-MCNC: NORMAL MG/DL

## 2022-03-04 ENCOUNTER — OFFICE VISIT (OUTPATIENT)
Dept: FAMILY MEDICINE CLINIC | Age: 72
End: 2022-03-04
Payer: MEDICARE

## 2022-03-04 VITALS
HEART RATE: 96 BPM | TEMPERATURE: 99 F | SYSTOLIC BLOOD PRESSURE: 124 MMHG | DIASTOLIC BLOOD PRESSURE: 70 MMHG | RESPIRATION RATE: 16 BRPM | WEIGHT: 144.4 LBS | BODY MASS INDEX: 24.03 KG/M2

## 2022-03-04 DIAGNOSIS — J02.9 SORE THROAT: ICD-10-CM

## 2022-03-04 DIAGNOSIS — J20.9 BRONCHITIS WITH BRONCHOSPASM: Primary | ICD-10-CM

## 2022-03-04 DIAGNOSIS — R05.9 COUGH: ICD-10-CM

## 2022-03-04 DIAGNOSIS — B96.89 ACUTE BACTERIAL SINUSITIS: ICD-10-CM

## 2022-03-04 DIAGNOSIS — J01.90 ACUTE BACTERIAL SINUSITIS: ICD-10-CM

## 2022-03-04 LAB
INFLUENZA A ANTIBODY: NOT DETECTED
INFLUENZA B ANTIBODY: NOT DETECTED
Lab: NORMAL
QC PASS/FAIL: NORMAL
S PYO AG THROAT QL: NORMAL
SARS-COV-2 RDRP RESP QL NAA+PROBE: NEGATIVE

## 2022-03-04 PROCEDURE — 99214 OFFICE O/P EST MOD 30 MIN: CPT | Performed by: NURSE PRACTITIONER

## 2022-03-04 PROCEDURE — 87635 SARS-COV-2 COVID-19 AMP PRB: CPT | Performed by: NURSE PRACTITIONER

## 2022-03-04 PROCEDURE — 87804 INFLUENZA ASSAY W/OPTIC: CPT | Performed by: NURSE PRACTITIONER

## 2022-03-04 PROCEDURE — 87880 STREP A ASSAY W/OPTIC: CPT | Performed by: NURSE PRACTITIONER

## 2022-03-04 RX ORDER — DOXYCYCLINE HYCLATE 100 MG
100 TABLET ORAL 2 TIMES DAILY
Qty: 20 TABLET | Refills: 0 | Status: CANCELLED | OUTPATIENT
Start: 2022-03-04 | End: 2022-03-14

## 2022-03-04 RX ORDER — ASCORBIC ACID 500 MG
500 TABLET ORAL DAILY
COMMUNITY

## 2022-03-04 RX ORDER — ZINC GLUCONATE 50 MG
TABLET ORAL
COMMUNITY
Start: 2022-02-20

## 2022-03-04 RX ORDER — DEXTROMETHORPHAN HYDROBROMIDE AND PROMETHAZINE HYDROCHLORIDE 15; 6.25 MG/5ML; MG/5ML
5 SYRUP ORAL 4 TIMES DAILY PRN
Qty: 118 ML | Refills: 0 | Status: SHIPPED | OUTPATIENT
Start: 2022-03-04 | End: 2022-03-11

## 2022-03-04 RX ORDER — CEFDINIR 300 MG/1
300 CAPSULE ORAL 2 TIMES DAILY
Qty: 20 CAPSULE | Refills: 0 | Status: SHIPPED | OUTPATIENT
Start: 2022-03-04 | End: 2022-03-14

## 2022-03-04 RX ORDER — BENZONATATE 100 MG/1
100 CAPSULE ORAL 3 TIMES DAILY PRN
Qty: 30 CAPSULE | Refills: 0 | Status: CANCELLED | OUTPATIENT
Start: 2022-03-04 | End: 2022-03-11

## 2022-03-04 ASSESSMENT — ENCOUNTER SYMPTOMS
WHEEZING: 0
CHANGE IN BOWEL HABIT: 0
HEMOPTYSIS: 0
NAUSEA: 0
HEARTBURN: 0
SINUS PRESSURE: 1
HOARSE VOICE: 1
VOMITING: 0
SWOLLEN GLANDS: 0
SORE THROAT: 1
SHORTNESS OF BREATH: 0
COUGH: 1
SINUS COMPLAINT: 1
VISUAL CHANGE: 0
RHINORRHEA: 1

## 2022-03-04 NOTE — PROGRESS NOTES
[facility]  [address] [facility] [address]  Dept: [phone]  Dept Fax: [phone]: [phone]     Visit Date:  3/4/2022      Patient:  Karyn Stevens  YOB: 1950    HPI:     Chief Complaint   Patient presents with    Cough     Cough and chest congestion along with hoarseness over the past few days. Sinus pressure and congestion for the past 2 weeks. Using Sudafed and Robitussin with little benefit. Chills but no known fevers. Pt presents to the office today for congestion and drinage for past 2 weeks, but over the past 3-4 days she has been coughing and having chills. No fever that she knows of. Some body aches and cough is getting worse. No change in taste or smell. No recent exposure to illness that she knows of. Her throat is very red and she sees white patches in the back. .      Cough  This is a new problem. Episode onset: 2 weeks. The problem has been gradually worsening. The cough is productive of purulent sputum. Associated symptoms include chills, nasal congestion, postnasal drip, rhinorrhea and a sore throat. Pertinent negatives include no chest pain, ear congestion, ear pain, fever, headaches, heartburn, hemoptysis, myalgias, shortness of breath, sweats, weight loss or wheezing. The symptoms are aggravated by lying down. She has tried rest, cool air, body position changes and OTC cough suppressant for the symptoms. Her past medical history is significant for environmental allergies. There is no history of asthma, bronchiectasis, bronchitis or emphysema. Pharyngitis  This is a new problem. The current episode started in the past 7 days. The problem occurs daily. The problem has been unchanged. Associated symptoms include chills, congestion, coughing, fatigue and a sore throat.  Pertinent negatives include no anorexia, arthralgias, change in bowel habit, chest pain, diaphoresis, fever, headaches, joint swelling, myalgias, nausea, neck pain, swollen glands, urinary symptoms, vertigo, visual change or vomiting. She has tried sleep, rest, acetaminophen, position changes and drinking for the symptoms. The treatment provided mild relief. Sinus Problem  This is a new problem. Episode onset: 2 weeks. The problem has been gradually worsening since onset. There has been no fever. The pain is mild. Associated symptoms include chills, congestion, coughing, a hoarse voice, sinus pressure and a sore throat. Pertinent negatives include no diaphoresis, ear pain, headaches, neck pain, shortness of breath or swollen glands. Past treatments include oral decongestants, sitting up and acetaminophen. The treatment provided mild relief.              Medications    Current Outpatient Medications:     zinc gluconate 50 MG tablet, Zinc Active 50 MG PO Daily February 20th, 2022 6:52pm, Disp: , Rfl:     vitamin C (ASCORBIC ACID) 500 MG tablet, Take 500 mg by mouth daily, Disp: , Rfl:     cefdinir (OMNICEF) 300 MG capsule, Take 1 capsule by mouth 2 times daily for 10 days, Disp: 20 capsule, Rfl: 0    promethazine-dextromethorphan (PROMETHAZINE-DM) 6.25-15 MG/5ML syrup, Take 5 mLs by mouth 4 times daily as needed for Cough, Disp: 118 mL, Rfl: 0    ibuprofen (ADVIL;MOTRIN) 400 MG tablet, Take 800 mg by mouth every 6 hours as needed, Disp: , Rfl:     VITAMIN D PO, Take by mouth daily, Disp: , Rfl:     umeclidinium-vilanterol (ANORO ELLIPTA) 62.5-25 MCG/INH AEPB inhaler, Inhale 1 puff into the lungs daily, Disp: 60 each, Rfl: 5    b complex vitamins capsule, Take 1 capsule by mouth daily, Disp: , Rfl:     Probiotic Product (PROBIOTIC DAILY PO), Take by mouth daily , Disp: , Rfl:     PROAIR  (90 Base) MCG/ACT inhaler, Inhale 2 puffs into the lungs every 4 hours as needed for Wheezing, Disp: 1 Inhaler, Rfl: 2    Acetaminophen (TYLENOL PO), Take by mouth as needed, Disp: , Rfl:     Calcium-Magnesium (CALCIUM MAGNESIUM 750) 300-300 MG TABS, Take by mouth daily , Disp: , Rfl:     aspirin 81 MG EC tablet, Take 81 mg by mouth daily, Disp: , Rfl:     Multiple Vitamins-Minerals (MULTIVITAMIN PO), Take  by mouth daily. , Disp: , Rfl:     The patient is allergic to prednisone and sulfa antibiotics. Past Medical History  Radha Ji  has a past medical history of Anxiety, Hyperlipidemia, Kidney stones, Osteoarthritis, Restless leg syndrome, Tobacco abuse, and Viral hepatitis A. Subjective:      Review of Systems   Constitutional: Positive for chills and fatigue. Negative for diaphoresis, fever and weight loss. HENT: Positive for congestion, hoarse voice, postnasal drip, rhinorrhea, sinus pressure and sore throat. Negative for ear pain. Respiratory: Positive for cough. Negative for hemoptysis, shortness of breath and wheezing. Cardiovascular: Negative for chest pain. Gastrointestinal: Negative for anorexia, change in bowel habit, heartburn, nausea and vomiting. Musculoskeletal: Negative for arthralgias, joint swelling, myalgias and neck pain. Allergic/Immunologic: Positive for environmental allergies. Neurological: Negative for vertigo and headaches. Objective:     /70   Pulse 96   Temp 99 °F (37.2 °C) (Oral)   Resp 16   Wt 144 lb 6.4 oz (65.5 kg)   BMI 24.03 kg/m²     Physical Exam  Vitals reviewed. Constitutional:       General: She is not in acute distress. Appearance: Normal appearance. She is well-developed. HENT:      Head: Normocephalic and atraumatic. Right Ear: Hearing, ear canal and external ear normal. A middle ear effusion is present. Tympanic membrane is not perforated, erythematous or retracted. Left Ear: Hearing, ear canal and external ear normal. A middle ear effusion is present. Tympanic membrane is not perforated, erythematous or retracted. Nose: Mucosal edema and congestion present. No nasal tenderness.       Right Sinus: Maxillary sinus tenderness and frontal sinus tenderness present. Left Sinus: Maxillary sinus tenderness and frontal sinus tenderness present. Mouth/Throat:      Lips: Pink. Mouth: Mucous membranes are moist. No oral lesions. Tongue: No lesions. Pharynx: Oropharynx is clear. Uvula midline. Posterior oropharyngeal erythema present. No pharyngeal swelling or oropharyngeal exudate. Eyes:      General:         Right eye: No discharge. Left eye: No discharge. Conjunctiva/sclera: Conjunctivae normal.   Neck:      Trachea: No tracheal deviation. Cardiovascular:      Rate and Rhythm: Normal rate and regular rhythm. Heart sounds: Normal heart sounds. No murmur heard. Pulmonary:      Effort: Pulmonary effort is normal. No respiratory distress. Breath sounds: Normal breath sounds. Comments: Harsh, congested cough  Abdominal:      General: Bowel sounds are normal.      Palpations: Abdomen is soft. Tenderness: There is no abdominal tenderness. Musculoskeletal:      Cervical back: Full passive range of motion without pain and neck supple. Lymphadenopathy:      Head:      Right side of head: No submental, submandibular, tonsillar, preauricular, posterior auricular or occipital adenopathy. Left side of head: No submental, submandibular, tonsillar, preauricular, posterior auricular or occipital adenopathy. Cervical: No cervical adenopathy. Skin:     General: Skin is warm and dry. Findings: No rash. Neurological:      General: No focal deficit present. Mental Status: She is alert and oriented to person, place, and time. Coordination: Coordination normal.   Psychiatric:         Mood and Affect: Mood normal.         Behavior: Behavior normal.         Thought Content:  Thought content normal.         Judgment: Judgment normal.           Results for POC orders placed in visit on 03/04/22   POCT Influenza A/B   Result Value Ref Range    Influenza A Ab not detected     Influenza B Ab not detected    POCT rapid strep A   Result Value Ref Range    Strep A Ag None Detected None Detected       Assessment/Plan:      Thalia Juarez was seen today for cough. Diagnoses and all orders for this visit:    Bronchitis with bronchospasm  -     cefdinir (OMNICEF) 300 MG capsule; Take 1 capsule by mouth 2 times daily for 10 days    Cough  -     Cancel: POCT COVID-19, Antigen  -     POCT Influenza A/B  -     promethazine-dextromethorphan (PROMETHAZINE-DM) 6.25-15 MG/5ML syrup; Take 5 mLs by mouth 4 times daily as needed for Cough  -     POCT COVID-19 Rapid, NAAT    Sore throat  -     POCT rapid strep A  -     POCT COVID-19 Rapid, NAAT    Acute bacterial sinusitis    - Strep, influenza and COVID testing in the office negative. - Rest and increase fluids  - Tylenol as needed for pain and fever  - Good handwashing and clean all surfaces touched  - Call office with any questions or concerns, or if symptoms are getting worse or changing  - ER for any chest pain or SOB      Return in about 4 weeks (around 4/1/2022), or if symptoms worsen or fail to improve, for Medicare AWV. Patient given educational materials - see patient instructions. Discussed use, benefit, and side effects of prescribed medications. All patient questions answered. Pt voiced understanding.         Electronically signed by MARVEL Flores CNP on 3/4/2022 at 1:45 PM

## 2022-04-06 ENCOUNTER — OFFICE VISIT (OUTPATIENT)
Dept: FAMILY MEDICINE CLINIC | Age: 72
End: 2022-04-06
Payer: MEDICARE

## 2022-04-06 VITALS
BODY MASS INDEX: 24.86 KG/M2 | RESPIRATION RATE: 16 BRPM | HEIGHT: 64 IN | WEIGHT: 145.6 LBS | TEMPERATURE: 98.3 F | SYSTOLIC BLOOD PRESSURE: 126 MMHG | DIASTOLIC BLOOD PRESSURE: 70 MMHG | HEART RATE: 80 BPM

## 2022-04-06 DIAGNOSIS — F41.9 ANXIETY: ICD-10-CM

## 2022-04-06 DIAGNOSIS — Z12.11 COLON CANCER SCREENING: ICD-10-CM

## 2022-04-06 DIAGNOSIS — Z12.31 ENCOUNTER FOR SCREENING MAMMOGRAM FOR MALIGNANT NEOPLASM OF BREAST: ICD-10-CM

## 2022-04-06 DIAGNOSIS — Z00.00 INITIAL MEDICARE ANNUAL WELLNESS VISIT: Primary | ICD-10-CM

## 2022-04-06 PROCEDURE — G0438 PPPS, INITIAL VISIT: HCPCS | Performed by: NURSE PRACTITIONER

## 2022-04-06 RX ORDER — DIAZEPAM 5 MG/1
5 TABLET ORAL EVERY 6 HOURS PRN
Qty: 30 TABLET | Refills: 2 | Status: SHIPPED | OUTPATIENT
Start: 2022-04-06 | End: 2022-05-06

## 2022-04-06 ASSESSMENT — PATIENT HEALTH QUESTIONNAIRE - PHQ9
10. IF YOU CHECKED OFF ANY PROBLEMS, HOW DIFFICULT HAVE THESE PROBLEMS MADE IT FOR YOU TO DO YOUR WORK, TAKE CARE OF THINGS AT HOME, OR GET ALONG WITH OTHER PEOPLE: 0
3. TROUBLE FALLING OR STAYING ASLEEP: 0
7. TROUBLE CONCENTRATING ON THINGS, SUCH AS READING THE NEWSPAPER OR WATCHING TELEVISION: 0
9. THOUGHTS THAT YOU WOULD BE BETTER OFF DEAD, OR OF HURTING YOURSELF: 0
2. FEELING DOWN, DEPRESSED OR HOPELESS: 0
5. POOR APPETITE OR OVEREATING: 0
SUM OF ALL RESPONSES TO PHQ QUESTIONS 1-9: 0
1. LITTLE INTEREST OR PLEASURE IN DOING THINGS: 0
4. FEELING TIRED OR HAVING LITTLE ENERGY: 0
SUM OF ALL RESPONSES TO PHQ9 QUESTIONS 1 & 2: 0
6. FEELING BAD ABOUT YOURSELF - OR THAT YOU ARE A FAILURE OR HAVE LET YOURSELF OR YOUR FAMILY DOWN: 0
SUM OF ALL RESPONSES TO PHQ QUESTIONS 1-9: 0
8. MOVING OR SPEAKING SO SLOWLY THAT OTHER PEOPLE COULD HAVE NOTICED. OR THE OPPOSITE, BEING SO FIGETY OR RESTLESS THAT YOU HAVE BEEN MOVING AROUND A LOT MORE THAN USUAL: 0
SUM OF ALL RESPONSES TO PHQ QUESTIONS 1-9: 0
SUM OF ALL RESPONSES TO PHQ QUESTIONS 1-9: 0

## 2022-04-06 ASSESSMENT — LIFESTYLE VARIABLES: HOW OFTEN DO YOU HAVE A DRINK CONTAINING ALCOHOL: NEVER

## 2022-04-06 NOTE — PATIENT INSTRUCTIONS
Personalized Preventive Plan for Gee Jean - 4/6/2022  Medicare offers a range of preventive health benefits. Some of the tests and screenings are paid in full while other may be subject to a deductible, co-insurance, and/or copay. Some of these benefits include a comprehensive review of your medical history including lifestyle, illnesses that may run in your family, and various assessments and screenings as appropriate. After reviewing your medical record and screening and assessments performed today your provider may have ordered immunizations, labs, imaging, and/or referrals for you. A list of these orders (if applicable) as well as your Preventive Care list are included within your After Visit Summary for your review. Other Preventive Recommendations:    · A preventive eye exam performed by an eye specialist is recommended every 1-2 years to screen for glaucoma; cataracts, macular degeneration, and other eye disorders. · A preventive dental visit is recommended every 6 months. · Try to get at least 150 minutes of exercise per week or 10,000 steps per day on a pedometer . · Order or download the FREE \"Exercise & Physical Activity: Your Everyday Guide\" from [employer]. Call 8-205.495.3610 or search [employer] online. · You need 4140-3309 mg of calcium and 4228-1961 IU of vitamin D per day. It is possible to meet your calcium requirement with diet alone, but a vitamin D supplement is usually necessary to meet this goal.  · When exposed to the sun, use a sunscreen that protects against both UVA and UVB radiation with an SPF of 30 or greater. Reapply every 2 to 3 hours or after sweating, drying off with a towel, or swimming. · Always wear a seat belt when traveling in a car. Always wear a helmet when riding a bicycle or motorcycle.

## 2022-04-06 NOTE — PROGRESS NOTES
[address] [address]  Dept: [phone]  Dept Fax: [phone]: [phone]      Medicare Annual Wellness Visit    Osei Escudero is here for Medicare AWV (Pt would like to discuss some soreness/tenderness on the left side of her jaw and down her neck for the last few days and would like to discuss.)    Assessment & Plan   Initial Medicare annual wellness visit  Anxiety  -     diazePAM (VALIUM) 5 MG tablet; Take 1 tablet by mouth every 6 hours as needed for Anxiety for up to 30 days. , Disp-30 tablet, R-2Normal  Encounter for screening mammogram for malignant neoplasm of breast  -     MISTI DIGITAL SCREEN W OR WO CAD BILATERAL; Future  Colon cancer screening  -     AFL - Ray Marks MD, Gastroenterology, [facility]           Controlled Substance Monitoring:    Acute and Chronic Pain Monitoring:   RX Monitoring 4/7/2022   Attestation -   Periodic Controlled Substance Monitoring No signs of potential drug abuse or diversion identified. ;Assessed functional status. ;Obtaining appropriate analgesic effect of treatment.;Possible medication side effects, risk of tolerance/dependence & alternative treatments discussed. Recommendations for Preventive Services Due: see orders and patient instructions/AVS.  Recommended screening schedule for the next 5-10 years is provided to the patient in written form: see Patient Instructions/AVS.     Return for Medicare Annual Wellness Visit in 1 year. Subjective       Patient's complete Health Risk Assessment and screening values have been reviewed and are found in Flowsheets. The following problems were reviewed today and where indicated follow up appointments were made and/or referrals ordered.     Positive Risk Factor Screenings with Interventions:               General Health and ACP:  General  In general, how would you say your health is?: Very Good  In the past 7 days, have you experienced any of the following: New or Increased Pain, New or Increased Fatigue, Loneliness, Social Isolation, Stress or Anger?: No  Do you get the social and emotional support that you need?: Yes  Do you have a Living Will?: Yes    Advance Directives     Power of Garima Willett Will ACP-Advance Directive ACP-Power of     Not on File Not on File Not on File Not on File      General Health Risk Interventions:  · No Living Will: Patient declines ACP discussion/assistance     Hearing/Vision:  Do you or your family notice any trouble with your hearing that hasn't been managed with hearing aids?: No  Do you have difficulty driving, watching TV, or doing any of your daily activities because of your eyesight?: No  Have you had an eye exam within the past year?: (!) No  No exam data present    Hearing/Vision Interventions:  · Vision concerns:  patient encouraged to make appointment with his/her eye specialist    Safety:  Do you have working smoke detectors?: Yes  Do you have any tripping hazards - loose or unsecured carpets or rugs?: No  Do you have any tripping hazards - clutter in doorways, halls, or stairs?: No  Do you have either shower bars, grab bars, non-slip mats or non-slip surfaces in your shower or bathtub?: (!) No  Do all of your stairways have a railing or banister?: Yes  Do you always fasten your seatbelt when you are in a car?: Yes    Safety Interventions:  · Home safety tips provided           Objective   Vitals:    04/06/22 1457   BP: 126/70   Pulse: 80   Resp: 16   Temp: 98.3 °F (36.8 °C)   TempSrc: Oral   Weight: 145 lb 9.6 oz (66 kg)   Height: 5' 4\" (1.626 m)      Body mass index is 24.99 kg/m².         General Appearance: alert and oriented to person, place and time, well-developed and well-nourished, in no acute distress  Skin: warm and dry, no rash or erythema  Head: normocephalic and atraumatic  Eyes: conjunctivae normal  ENT: tympanic membrane, external ear and ear canal normal bilaterally, oropharynx clear and moist with normal mucous membranes  Neck: neck supple and non tender without mass, no thyromegaly or thyroid nodules, no cervical lymphadenopathy   Pulmonary/Chest: clear to auscultation bilaterally- no wheezes, rales or rhonchi, normal air movement, no respiratory distress  Cardiovascular: normal rate, normal S1 and S2, no gallops and no carotid bruits  Abdomen: soft, non-tender, non-distended, normal bowel sounds, no masses or organomegaly  Extremities: no cyanosis and no clubbing  Musculoskeletal: normal range of motion, no joint swelling, deformity or tenderness  Neurologic: gait and coordination normal and speech normal       Allergies   Allergen Reactions    Prednisone Swelling     Tongue swelling    Sulfa Antibiotics      Flank pain     Prior to Visit Medications    Medication Sig Taking? Authorizing Provider   diazePAM (VALIUM) 5 MG tablet Take 1 tablet by mouth every 6 hours as needed for Anxiety for up to 30 days.  Yes MARVEL Otero CNP   zinc gluconate 50 MG tablet Zinc Active 50 MG PO Daily February 20th, 2022 6:52pm Yes Historical Provider, MD   vitamin C (ASCORBIC ACID) 500 MG tablet Take 500 mg by mouth daily Yes Historical Provider, MD   ibuprofen (ADVIL;MOTRIN) 400 MG tablet Take 800 mg by mouth every 6 hours as needed Yes Historical Provider, MD   VITAMIN D PO Take by mouth daily Yes Historical Provider, MD   umeclidinium-vilanterol (ANORO ELLIPTA) 62.5-25 MCG/INH AEPB inhaler Inhale 1 puff into the lungs daily  Patient taking differently: Inhale 1 puff into the lungs as needed  Yes Bernice Prather MD   b complex vitamins capsule Take 1 capsule by mouth Occasionally Yes Historical Provider, MD   Probiotic Product (PROBIOTIC DAILY PO) Take by mouth daily  Yes Historical Provider, MD   PROAIR  (90 Base) MCG/ACT inhaler Inhale 2 puffs into the lungs every 4 hours as needed for Wheezing Yes MARVEL Otero CNP   Acetaminophen (TYLENOL PO) Take by mouth as needed Yes Historical Provider, MD   Calcium-Magnesium (CALCIUM MAGNESIUM 750) 300-300 MG TABS Take by mouth Occasionally Yes Historical Provider, MD   aspirin 81 MG EC tablet Take 81 mg by mouth Occasionally Yes Historical Provider, MD   Multiple Vitamins-Minerals (MULTIVITAMIN PO) Take  by mouth daily.  Yes Historical Provider, MD [place] (Including outside providers/suppliers regularly involved in providing care):   Patient Care Team:  MARVEL Ivan CNP as PCP - General (Family Nurse Practitioner)  MARVEL Ivan CNP as PCP - [facility] Empaneled Provider    Reviewed and updated this visit:  Tobacco  Allergies  Meds  Problems  Med Hx  Surg Hx  Soc Hx  Fam Hx        Electronically signed by MARVEL Putnam CNP on 4/7/2022 at 8:03 AM

## 2022-04-07 ENCOUNTER — TELEPHONE (OUTPATIENT)
Dept: FAMILY MEDICINE CLINIC | Age: 72
End: 2022-04-07

## 2022-04-07 DIAGNOSIS — E78.2 MIXED HYPERLIPIDEMIA: Primary | ICD-10-CM

## 2022-04-07 RX ORDER — ROSUVASTATIN CALCIUM 20 MG/1
20 TABLET, COATED ORAL NIGHTLY
Qty: 30 TABLET | Refills: 2 | Status: SHIPPED | OUTPATIENT
Start: 2022-04-07

## 2022-04-07 NOTE — TELEPHONE ENCOUNTER
----- Message from MARVEL Del Valle CNP sent at 4/7/2022  8:37 AM EDT -----  Please call pt and let her know that I did review the labs from Feb 2022 and her total cholesterol is elevated at 231, LDL is elevated at 121 and triglycerides are elevated at 262. I would recommend starting cholesterol medication to help with these numbers and lower her cardiovascular event risk. OK for Crestor 10 mg nightly, #90/1 and repeat FLP, AST and ALT in 3 months if pt is willing. All other labs looked good. Follow up as planned.  -WS

## 2022-04-07 NOTE — TELEPHONE ENCOUNTER
Patient notified and agreeable to starting Crestor. Does not want 90 day supply sent though in case she cannot tolerate. Rx sent as #30/2. Orders mailed.

## 2022-05-03 ENCOUNTER — HOSPITAL ENCOUNTER (OUTPATIENT)
Dept: WOMENS IMAGING | Age: 72
Discharge: HOME | End: 2022-05-03
Payer: MEDICARE

## 2022-05-03 DIAGNOSIS — Z12.31 ENCOUNTER FOR SCREENING MAMMOGRAM FOR MALIGNANT NEOPLASM OF BREAST: ICD-10-CM

## 2022-05-03 PROCEDURE — 77063 BREAST TOMOSYNTHESIS BI: CPT

## 2022-05-17 ENCOUNTER — HOSPITAL ENCOUNTER (OUTPATIENT)
Dept: PULMONOLOGY | Age: 72
Discharge: HOME | End: 2022-05-17
Payer: MEDICARE

## 2022-05-17 ENCOUNTER — HOSPITAL ENCOUNTER (OUTPATIENT)
Dept: CT IMAGING | Age: 72
Discharge: HOME | End: 2022-05-17
Payer: MEDICARE

## 2022-05-17 DIAGNOSIS — J43.2 CENTRILOBULAR EMPHYSEMA (HCC): ICD-10-CM

## 2022-05-17 DIAGNOSIS — Z87.891 PERSONAL HISTORY OF TOBACCO USE: ICD-10-CM

## 2022-05-17 PROCEDURE — 94060 EVALUATION OF WHEEZING: CPT

## 2022-05-17 PROCEDURE — 71271 CT THORAX LUNG CANCER SCR C-: CPT

## 2022-05-24 ENCOUNTER — OFFICE VISIT (OUTPATIENT)
Dept: PULMONOLOGY | Age: 72
End: 2022-05-24
Payer: MEDICARE

## 2022-05-24 VITALS
HEIGHT: 64 IN | SYSTOLIC BLOOD PRESSURE: 124 MMHG | DIASTOLIC BLOOD PRESSURE: 76 MMHG | TEMPERATURE: 97.3 F | BODY MASS INDEX: 24.41 KG/M2 | HEART RATE: 82 BPM | OXYGEN SATURATION: 98 % | WEIGHT: 143 LBS

## 2022-05-24 DIAGNOSIS — Z14.8 ALPHA-1-ANTITRYPSIN DEFICIENCY CARRIER: ICD-10-CM

## 2022-05-24 DIAGNOSIS — J43.2 CENTRILOBULAR EMPHYSEMA (HCC): Primary | ICD-10-CM

## 2022-05-24 DIAGNOSIS — Z87.891 PERSONAL HISTORY OF TOBACCO USE: ICD-10-CM

## 2022-05-24 PROCEDURE — 99214 OFFICE O/P EST MOD 30 MIN: CPT | Performed by: NURSE PRACTITIONER

## 2022-05-24 PROCEDURE — G0296 VISIT TO DETERM LDCT ELIG: HCPCS | Performed by: NURSE PRACTITIONER

## 2022-05-24 PROCEDURE — 1123F ACP DISCUSS/DSCN MKR DOCD: CPT | Performed by: NURSE PRACTITIONER

## 2022-05-24 ASSESSMENT — ENCOUNTER SYMPTOMS
VOMITING: 0
CHEST TIGHTNESS: 0
HEMOPTYSIS: 0
SHORTNESS OF BREATH: 1
DIARRHEA: 0
COUGH: 1
NAUSEA: 0
STRIDOR: 0
SPUTUM PRODUCTION: 1
WHEEZING: 1

## 2022-05-24 ASSESSMENT — COPD QUESTIONNAIRES: COPD: 1

## 2022-05-24 NOTE — PROGRESS NOTES
[facility]    Patient: Luis Silva, 70 y.o.   : 1950    Pt of Dr. Arely Godinze   Patient presents with    Follow-up     1 year Emphysema follow up with CT and diony         COPD  She complains of cough, shortness of breath, sputum production and wheezing. There is no hemoptysis. This is a chronic problem. The current episode started more than 1 year ago. The problem occurs daily. The problem has been unchanged. The cough is productive of sputum (white to light yellow). Associated symptoms include dyspnea on exertion, nasal congestion and postnasal drip. Pertinent negatives include no chest pain or fever. Her symptoms are aggravated by change in weather and strenuous activity. Her symptoms are alleviated by beta-agonist and rest. She reports significant improvement on treatment. Risk factors for lung disease include smoking/tobacco exposure. Her past medical history is significant for COPD and emphysema. Mick Capellan is here for follow up for COPD/emphysema with spirometry and LDCT. PMH significant for Anxiety, kidney stones, OA, RLS, recently diagnosed with vocal cord polyp that has since been removed by ENT at [facility] on 2021 reported to patient as non-cancerous. Overall patient reports respiratory symptoms have been stable since last appointment. Patient reports intermittent  compliance with inhaled medications (Anoro) using when she \"needs it \". Patient using albuterol 2  times per month on average. Patient reports minimal physical limitation due to respiratory symptoms. Former smoker 52 yrs 0.75 PPD  Quit 2017    Progress History:   Since last visit any new medical issues? No  New ER or hospital visits? No  Any new or changes in medicines? No  Using inhalers? Yes Anoro using intermittently  Are they helpful?  Yes     Past Medical hx   PMH:  Past Medical History:   Diagnosis Date    Anxiety     Hyperlipidemia  Kidney stones     Osteoarthritis     Restless leg syndrome     Tobacco abuse     Viral hepatitis A      SURGICAL HISTORY:  Past Surgical History:   Procedure Laterality Date    CHOLECYSTECTOMY  1983    COLONOSCOPY      ERCP      HYSTERECTOMY  1981    Right ovary intact    OVARIAN CYST REMOVAL Right 1985    POLYPECTOMY  2016    ROTATOR CUFF REPAIR Left 2017    Dr. Suleman Freedman  Age 5    UPPER GASTROINTESTINAL ENDOSCOPY      VOCAL CORD SURGERY  2021    [facility]     SOCIAL HISTORY:  Social History     Tobacco Use    Smoking status: Former Smoker     Packs/day: 0.75     Years: 49.00     Pack years: 36.75     Types: Cigarettes     Start date:      Quit date: 10/21/2017     Years since quittin.5    Smokeless tobacco: Never Used   Vaping Use    Vaping Use: Never used   Substance Use Topics    Alcohol use: No    Drug use: No     ALLERGIES:  Allergies   Allergen Reactions    Prednisone Swelling     Tongue swelling    Sulfa Antibiotics      Flank pain     FAMILY HISTORY:  Family History   Problem Relation Age of Onset    Diabetes Mother         Type 2    Kidney Disease Mother     High Cholesterol Mother     High Blood Pressure Mother     Heart Disease Mother     High Blood Pressure Father     Liver Cancer Father     Liver Cancer Brother     Heart Attack Maternal Grandmother     Heart Disease Maternal Grandmother     High Blood Pressure Maternal Grandmother     Parkinsonism Maternal Grandfather     Heart Attack Paternal Grandmother     High Blood Pressure Sister     High Cholesterol Sister     Diabetes Brother     Coronary Art Dis Brother     Stroke Brother     No Known Problems Brother     No Known Problems Brother     No Known Problems Sister      CURRENT MEDICATIONS:  Current Outpatient Medications   Medication Sig Dispense Refill    rosuvastatin (CRESTOR) 20 MG tablet Take 1 tablet by mouth nightly 30 tablet 2    zinc gluconate 50 MG tablet Zinc Active 50 MG PO Daily February 20th, 2022 6:52pm      vitamin C (ASCORBIC ACID) 500 MG tablet Take 500 mg by mouth daily      ibuprofen (ADVIL;MOTRIN) 400 MG tablet Take 800 mg by mouth every 6 hours as needed      VITAMIN D PO Take by mouth daily      umeclidinium-vilanterol (ANORO ELLIPTA) 62.5-25 MCG/INH AEPB inhaler Inhale 1 puff into the lungs daily (Patient taking differently: Inhale 1 puff into the lungs as needed ) 60 each 5    b complex vitamins capsule Take 1 capsule by mouth Occasionally      Probiotic Product (PROBIOTIC DAILY PO) Take by mouth daily       PROAIR  (90 Base) MCG/ACT inhaler Inhale 2 puffs into the lungs every 4 hours as needed for Wheezing 1 Inhaler 2    Acetaminophen (TYLENOL PO) Take by mouth as needed      Calcium-Magnesium (CALCIUM MAGNESIUM 750) 300-300 MG TABS Take by mouth Occasionally      aspirin 81 MG EC tablet Take 81 mg by mouth Occasionally      Multiple Vitamins-Minerals (MULTIVITAMIN PO) Take  by mouth daily. No current facility-administered medications for this visit. Kevin Garcia ROS   Review of Systems   Constitutional: Negative for chills, fever and unexpected weight change. HENT: Positive for postnasal drip. Respiratory: Positive for cough, sputum production, shortness of breath and wheezing. Negative for hemoptysis, chest tightness and stridor. Cardiovascular: Positive for dyspnea on exertion. Negative for chest pain and leg swelling. Gastrointestinal: Negative for diarrhea, nausea and vomiting. Genitourinary: Negative for dysuria. Physical exam   /76   Pulse 82   Temp 97.3 °F (36.3 °C)   Ht 5' 4\" (1.626 m)   Wt 143 lb (64.9 kg)   SpO2 98% Comment: r/a  BMI 24.55 kg/m²    Wt Readings from Last 3 Encounters:   05/24/22 143 lb (64.9 kg)   04/06/22 145 lb 9.6 oz (66 kg)   03/04/22 144 lb 6.4 oz (65.5 kg)       Physical Exam  Vitals and nursing note reviewed.    Constitutional:       General: She is not in acute distress. Appearance: She is well-developed. HENT:      Head: Normocephalic and atraumatic. Neck:      Trachea: No tracheal deviation. Cardiovascular:      Rate and Rhythm: Normal rate and regular rhythm. Heart sounds: Normal heart sounds. No murmur heard. Pulmonary:      Effort: Pulmonary effort is normal. No respiratory distress. Breath sounds: Normal breath sounds. No stridor. No wheezing or rales. Chest:      Chest wall: No tenderness. Abdominal:      General: Bowel sounds are normal. There is no distension. Palpations: Abdomen is soft. Musculoskeletal:      Cervical back: Neck supple. Skin:     General: Skin is warm and dry. Capillary Refill: Capillary refill takes less than 2 seconds. Neurological:      Mental Status: She is alert and oriented to person, place, and time. Psychiatric:         Behavior: Behavior normal.         Thought Content: Thought content normal.          Results   Lung Nodule Screening     [x] Qualifies    [] Does not qualify   [] Declined    [] Completed  LDCT protocol Quit smoking 2017   The USPSTF recommends annual screening for lung cancer with low-dose computed tomography (LDCT) in adults aged 48 to 80 years who have a 20 pack-year smoking history and currently smoke or have quit within the past 15 years. Screening should be discontinued once a person has not smoked for 15 years or develops a health problem that substantially limits life expectancy or the ability or willingness to have curative lung surgery. CT CHEST WO CONTRAST   5/13/2021   FINDINGS:  Lungs: There is a 6 mm noncalcified nodule within the right lower lobe, stable from prior examination in November 2020 (series 2 image 35). There is a 4 mm noncalcified nodule in the right lower lobe adjacent to the hemidiaphragm, stable from 2017 (series 2 image 46). No additional discrete pulmonary nodules are identified.   There are mild centrilobular emphysematous changes in both lungs. Lungs are clear of focal infiltrate. There is no pleural effusion. There is no pneumothorax. Proximal tracheobronchial tree is widely patent. Mediastinum and ezekiel: There is a 5 mm nodule within the right breast tissue, stable from 2017 (series 2 image 20). Recommend mammographic correlation. There is no axillary or mediastinal lymphadenopathy. No definite hilar lymphadenopathy on this noncontrast enhanced examination. There are calcified mediastinal and right hilar lymph nodes, relating to remote granulomatous disease. Heart size is normal. There is no pericardial effusion. There is atherosclerotic calcification of the thoracic aorta. Thoracic aorta is normal in caliber. There are coronary artery calcifications, however the study is not optimized for coronary artery evaluation. Upper abdomen: There is an unchanged linear hypoattenuating focus within the right hepatic lobe, which may relate to scarring. There is surgical absence of the gallbladder. Remainder of the visualized upper abdomen is unremarkable. Bones: There are no destructive osseous lesions. Vertebral body heights are maintained. There are mild degenerative changes of the thoracic spine. Impression:  1. 6 mm noncalcified nodule in the right lower lobe, stable from February 2020. 12 month follow-up CT recommended. 2. Mild emphysematous changes. NONCONTRAST SCREENING CT CHEST   5/17/2022   FINDINGS: LUNGS NODULES: 1. 3 mm nodule right apex not seen previously. This is possibly due to difference in slice selection. 6 mm nodule posterior right lower lobe stable. In this is stable dating to 11/4/2020 Adjacent to and 3 mm nodules at the middle right lung base stable dating back to 11/4/2020. LYMPHADENOPATHY: 1. There are no pathologically enlarged lymph nodes. OTHER (LUNGS/MEDIASTINUM/MUSCULOSKELETAL/ABDOMEN): 1. Heart size is normal coronary artery calcifications are present.  There are no infiltrates or effusions. No suspicious abnormalities in the upper abdomen. Status post cholecystectomy. Impression:  1. Multiple stable nodules in the right lung. One new 3 mm nodule. 2. There are no pathologically enlarged lymph nodes. 3. LUNGRADS ASSESSMENT VALUE: 2,       The LUNG RADS RECOMMENDATIONS for monitoring lung nodules listed below (ACR- Lung-RADS Version 1.0 Assessment Categories)  LUNG RADS RECOMMENDATIONS;   1.  Normal, continue annual screening   2. Benign appearance or behavior, continue annual screening   3.  6 month CT recommended   4A.  3 month CT recommended; may consider PET/CT   4B. Additional diagnostics and/or tissue sampling recommended   4X. Additional diagnostics and/or tissue sampling recommended            Assessment      Diagnosis Orders   1. Centrilobular emphysema ([address]     2. Personal history of tobacco use     3. Alpha-1-antitrypsin deficiency carrier           Plan   -Reviewed spirometry with patient mild obstruction   -LDCT reviewed nodules stable new 3 mm nodule repeat in 1 year   -Advised about compliance with anoro recommend continuing due to history of mucopurulent bronchitis  -Albuterol 2 puff Q6 hrs PRN for SOB/wheezing   -Discussed albuterol inhaler use. Reviewed signs and symptoms indicating need for use including Shortness of breath and wheezing. Discussed with patient the importance of using inhaler within the prescribed time frames. Patient verbalized understanding to use within prescribed time frame.  Patient also verbalized understanding that if they experience no relief after using albuterol and resting for 15 minutes they need to go to nearest ER or call 911.  -Advised to maintain pneumonia vaccine with PCP and to take flu vaccine this coming season.  -Advised patient to call office with any changes, questions, or concerns regarding respiratory status    Will see Julio Morning back in: 12 months with LDCT    Lida Rodgers CNP  5/24/2022       Wu Dose CT (LDCT) Lung Screening criteria met:     Age 50-77(Medicare) or 50-80 ([facility])   Pack year smoking >20   Still smoking or less than 15 year since quit   No sign or symptoms of lung cancer   > 11 months since last LDCT     Risks and benefits of lung cancer screening with LDCT scans discussed:    Significance of positive screen - False-positive LDCT results often occur. 95% of all positive results do not lead to a diagnosis of cancer. Usually further imaging can resolve most false-positive results; however, some patients may require invasive procedures. Over diagnosis risk - 10% to 12% of screen-detected lung cancer cases are over diagnosed--that is, the cancer would not have been detected in the patient's lifetime without the screening. Need for follow up screens annually to continue lung cancer screening effectiveness     Risks associated with radiation from annual LDCT- Radiation exposure is about the same as for a mammogram, which is about 1/3 of the annual background radiation exposure from everyday life. Starting screening at age 54 is not likely to increase cancer risk from radiation exposure. Patients with comorbidities resulting in life expectancy of < 10 years, or that would preclude treatment of an abnormality identified on CT, should not be screened due to lack of benefit.     To obtain maximal benefit from this screening, smoking cessation and long-term abstinence from smoking is critical

## 2023-05-01 ENCOUNTER — OFFICE VISIT (OUTPATIENT)
Dept: FAMILY MEDICINE CLINIC | Age: 73
End: 2023-05-01
Payer: MEDICARE

## 2023-05-01 VITALS
WEIGHT: 145.2 LBS | BODY MASS INDEX: 24.92 KG/M2 | RESPIRATION RATE: 16 BRPM | DIASTOLIC BLOOD PRESSURE: 70 MMHG | SYSTOLIC BLOOD PRESSURE: 134 MMHG | HEART RATE: 88 BPM

## 2023-05-01 DIAGNOSIS — E78.2 MIXED HYPERLIPIDEMIA: ICD-10-CM

## 2023-05-01 DIAGNOSIS — R53.83 OTHER FATIGUE: ICD-10-CM

## 2023-05-01 DIAGNOSIS — K86.9 PANCREATIC LESION: ICD-10-CM

## 2023-05-01 DIAGNOSIS — F41.9 ANXIETY: ICD-10-CM

## 2023-05-01 DIAGNOSIS — H65.93 OME (OTITIS MEDIA WITH EFFUSION), BILATERAL: Primary | ICD-10-CM

## 2023-05-01 DIAGNOSIS — E78.2 MIXED DYSLIPIDEMIA: ICD-10-CM

## 2023-05-01 PROCEDURE — 1123F ACP DISCUSS/DSCN MKR DOCD: CPT | Performed by: NURSE PRACTITIONER

## 2023-05-01 PROCEDURE — 99214 OFFICE O/P EST MOD 30 MIN: CPT | Performed by: NURSE PRACTITIONER

## 2023-05-01 RX ORDER — DIAZEPAM 5 MG/1
5 TABLET ORAL EVERY 6 HOURS PRN
Qty: 30 TABLET | Refills: 2 | Status: SHIPPED | OUTPATIENT
Start: 2023-05-01 | End: 2023-05-31

## 2023-05-01 RX ORDER — DOXYCYCLINE HYCLATE 100 MG
100 TABLET ORAL 2 TIMES DAILY
Qty: 20 TABLET | Refills: 0 | Status: SHIPPED | OUTPATIENT
Start: 2023-05-01 | End: 2023-05-11

## 2023-05-01 SDOH — ECONOMIC STABILITY: FOOD INSECURITY: WITHIN THE PAST 12 MONTHS, THE FOOD YOU BOUGHT JUST DIDN'T LAST AND YOU DIDN'T HAVE MONEY TO GET MORE.: NEVER TRUE

## 2023-05-01 SDOH — ECONOMIC STABILITY: INCOME INSECURITY: HOW HARD IS IT FOR YOU TO PAY FOR THE VERY BASICS LIKE FOOD, HOUSING, MEDICAL CARE, AND HEATING?: NOT HARD AT ALL

## 2023-05-01 SDOH — ECONOMIC STABILITY: FOOD INSECURITY: WITHIN THE PAST 12 MONTHS, YOU WORRIED THAT YOUR FOOD WOULD RUN OUT BEFORE YOU GOT MONEY TO BUY MORE.: NEVER TRUE

## 2023-05-01 SDOH — ECONOMIC STABILITY: HOUSING INSECURITY
IN THE LAST 12 MONTHS, WAS THERE A TIME WHEN YOU DID NOT HAVE A STEADY PLACE TO SLEEP OR SLEPT IN A SHELTER (INCLUDING NOW)?: NO

## 2023-05-01 ASSESSMENT — PATIENT HEALTH QUESTIONNAIRE - PHQ9
SUM OF ALL RESPONSES TO PHQ QUESTIONS 1-9: 0
1. LITTLE INTEREST OR PLEASURE IN DOING THINGS: 0
SUM OF ALL RESPONSES TO PHQ QUESTIONS 1-9: 0
SUM OF ALL RESPONSES TO PHQ QUESTIONS 1-9: 0
SUM OF ALL RESPONSES TO PHQ9 QUESTIONS 1 & 2: 0
SUM OF ALL RESPONSES TO PHQ QUESTIONS 1-9: 0
2. FEELING DOWN, DEPRESSED OR HOPELESS: 0

## 2023-05-01 ASSESSMENT — ENCOUNTER SYMPTOMS
DIARRHEA: 0
SORE THROAT: 0
COUGH: 0
ABDOMINAL PAIN: 0

## 2023-05-01 NOTE — PROGRESS NOTES
[facility]  [address] [address] [address]  Dept: [phone]  Dept Fax: [phone]: [phone]     Visit Date:  5/1/2023      Patient:  Neville Friday  YOB: 1950    HPI:     Chief Complaint   Patient presents with    Ear Problem     Pt c/o ear fullness in both ears for about a month now. Pt is concerned mostly with the right ear. Pt states that she has not been feeling the best and would like to discuss having some lab work completed. Pt presents to the office today for ear pain and pressure for over a month. She reports that she also has fullness and trouble hearing. No drainage. No fever or chills. The pain is on the Right more than the left. She has been using Allegra- D with some relief. Otalgia   There is pain in both ears. This is a new problem. The current episode started more than 1 month ago. The problem has been gradually worsening. There has been no fever. The pain is mild. Associated symptoms include rhinorrhea. Pertinent negatives include no abdominal pain, coughing, diarrhea, ear discharge, headaches, hearing loss, neck pain, rash, sore throat or vomiting. She has tried acetaminophen for the symptoms. The treatment provided mild relief. There is no history of a chronic ear infection, hearing loss or a tympanostomy tube. Pt is also due for annual exam and lab follow up. She follows up with pulmonary on a regular basis and Lung screening is scheduled for May 12th. She has been feeling more fatigued recently. She denies any chest pain or SOB. She is still able to do her normal activities, but is just tired all the time. Pt presents to the office today for anxiety. Pt currently taking Valium as needed for anxiety.   .    Side effects noted: none    Sleep-OK  Interest- OK  Guilt- OK  Energy- OK  Concentration- OK  Appetite- OK  Psychomotor Retardation- NO  Suicidal/ Homicidal

## 2023-05-02 ASSESSMENT — ENCOUNTER SYMPTOMS
VOMITING: 0
RHINORRHEA: 1

## 2023-05-03 LAB
ABSOLUTE BASO #: 0.03 K/UL (ref 0–0.2)
ABSOLUTE EOS #: 0.28 K/UL (ref 0–0.5)
ABSOLUTE LYMPH #: 3.04 K/UL (ref 1–4)
ABSOLUTE MONO #: 0.64 K/UL (ref 0.2–1)
ABSOLUTE NEUT #: 3.05 K/UL (ref 1.5–7.5)
ALBUMIN SERPL-MCNC: 4.6 G/DL (ref 3.5–5.2)
ALK PHOSPHATASE: 123 U/L (ref 40–142)
ALT SERPL-CCNC: 18 U/L (ref 5–40)
ANION GAP SERPL CALCULATED.3IONS-SCNC: 7 MEQ/L (ref 7–16)
AST SERPL-CCNC: 18 U/L (ref 9–40)
BASOPHILS RELATIVE PERCENT: 0.4 %
BILIRUB SERPL-MCNC: 0.7 MG/DL
BUN BLDV-MCNC: 11 MG/DL (ref 8–23)
CALCIUM SERPL-MCNC: 9.8 MG/DL (ref 8.5–10.5)
CHLORIDE BLD-SCNC: 103 MEQ/L (ref 95–107)
CHOLESTEROL/HDL RATIO: 3.7 RATIO
CHOLESTEROL: 252 MG/DL
CO2: 28 MEQ/L (ref 19–31)
CREAT SERPL-MCNC: 0.75 MG/DL (ref 0.6–1.3)
EGFR IF NONAFRICAN AMERICAN: 85 ML/MIN/1.73
EOSINOPHILS RELATIVE PERCENT: 4 %
GLUCOSE: 98 MG/DL (ref 70–99)
HCT VFR BLD CALC: 46.4 % (ref 34–45)
HDLC SERPL-MCNC: 68 MG/DL
HEMOGLOBIN: 16.1 G/DL (ref 11.5–15.5)
LDL CHOLESTEROL CALCULATED: 147 MG/DL
LDL/HDL RATIO: 2.2 RATIO
LYMPHOCYTE %: 43 %
MCH RBC QN AUTO: 31.9 PG (ref 25–33)
MCHC RBC AUTO-ENTMCNC: 34.7 G/DL (ref 31–36)
MCV RBC AUTO: 91.9 FL (ref 80–99)
MONOCYTES # BLD: 9.1 %
NEUTROPHILS RELATIVE PERCENT: 43.1 %
PDW BLD-RTO: 13.1 % (ref 11.5–15)
PLATELETS: 248 K/UL (ref 130–400)
PMV BLD AUTO: 9.8 FL (ref 9.3–13)
POTASSIUM SERPL-SCNC: 4 MEQ/L (ref 3.5–5.4)
RBC: 5.05 M/UL (ref 3.8–5.4)
SODIUM BLD-SCNC: 138 MEQ/L (ref 133–146)
T4 FREE: 1.52 NG/DL (ref 0.8–1.9)
TOTAL PROTEIN: 6.3 G/DL (ref 6.1–8.3)
TRIGL SERPL-MCNC: 184 MG/DL
TSH SERPL DL<=0.05 MIU/L-ACNC: 1.85 UIU/ML (ref 0.4–4.1)
VLDLC SERPL CALC-MCNC: 37 MG/DL
WBC: 7.1 K/UL (ref 3.5–11)

## 2023-05-04 ENCOUNTER — TELEPHONE (OUTPATIENT)
Dept: FAMILY MEDICINE CLINIC | Age: 73
End: 2023-05-04

## 2023-05-04 DIAGNOSIS — E78.2 MIXED HYPERLIPIDEMIA: ICD-10-CM

## 2023-05-04 NOTE — TELEPHONE ENCOUNTER
Pt informed and verbalized understanding, she states crestor keeps her up at night and would like other suggestions. Please advise.        Pharmacy verified, [facility]

## 2023-05-04 NOTE — TELEPHONE ENCOUNTER
----- Message from MARVEL Adams CNP sent at 5/4/2023  7:46 AM EDT -----  Labs show cholesterol is elevated at 252 and . Continue Crestor as directed and Work on diet and exercise. Follow up in June as planned and we will discuss further.  -WS

## 2023-05-04 NOTE — TELEPHONE ENCOUNTER
Patient called back and stated that she will need rx for the Crestor sent in. She has not been on this for more than 6 months. Would like 30 day supply sent to make sure that she can tolerate. If so, she will call back for long term rx. Rx pended.

## 2023-05-04 NOTE — TELEPHONE ENCOUNTER
Detailed message left with normal results, ok per hippa. Pt advised to return call with any questions.

## 2023-05-05 RX ORDER — ROSUVASTATIN CALCIUM 20 MG/1
20 TABLET, COATED ORAL NIGHTLY
Qty: 30 TABLET | Refills: 1 | Status: SHIPPED | OUTPATIENT
Start: 2023-05-05

## 2023-05-30 ENCOUNTER — TELEPHONE (OUTPATIENT)
Dept: FAMILY MEDICINE CLINIC | Age: 73
End: 2023-05-30

## 2023-05-30 DIAGNOSIS — B00.1 COLD SORE: ICD-10-CM

## 2023-05-30 DIAGNOSIS — J20.9 BRONCHITIS WITH BRONCHOSPASM: Primary | ICD-10-CM

## 2023-05-30 RX ORDER — ACYCLOVIR 400 MG/1
400 TABLET ORAL 3 TIMES DAILY
Qty: 30 TABLET | Refills: 0 | Status: SHIPPED | OUTPATIENT
Start: 2023-05-30 | End: 2023-06-09

## 2023-05-30 NOTE — TELEPHONE ENCOUNTER
Pt calls in stating that she has been trying to treat cold sores OTC with Abreva and is not doing well. She leaves for vacation today and would like something called in if possible.  She is using Salome Schultz

## 2023-05-30 NOTE — TELEPHONE ENCOUNTER
Noted.  RX sent to pharmacy.   Follow up in office if not improving. -WS    Orders Placed This Encounter   Medications    acyclovir (ZOVIRAX) 400 MG tablet     Sig: Take 1 tablet by mouth 3 times daily for 10 days     Dispense:  30 tablet     Refill:  0

## 2023-06-06 ENCOUNTER — OFFICE VISIT (OUTPATIENT)
Dept: FAMILY MEDICINE CLINIC | Age: 73
End: 2023-06-06
Payer: MEDICARE

## 2023-06-06 VITALS
HEIGHT: 64 IN | WEIGHT: 145.4 LBS | TEMPERATURE: 98.4 F | DIASTOLIC BLOOD PRESSURE: 76 MMHG | RESPIRATION RATE: 16 BRPM | BODY MASS INDEX: 24.82 KG/M2 | SYSTOLIC BLOOD PRESSURE: 118 MMHG | HEART RATE: 80 BPM

## 2023-06-06 DIAGNOSIS — J43.9 PULMONARY EMPHYSEMA, UNSPECIFIED EMPHYSEMA TYPE (HCC): ICD-10-CM

## 2023-06-06 DIAGNOSIS — Z00.00 MEDICARE ANNUAL WELLNESS VISIT, SUBSEQUENT: Primary | ICD-10-CM

## 2023-06-06 DIAGNOSIS — E78.2 MIXED HYPERLIPIDEMIA: ICD-10-CM

## 2023-06-06 DIAGNOSIS — F41.9 ANXIETY: ICD-10-CM

## 2023-06-06 DIAGNOSIS — Z12.11 COLON CANCER SCREENING: ICD-10-CM

## 2023-06-06 PROCEDURE — G0439 PPPS, SUBSEQ VISIT: HCPCS | Performed by: NURSE PRACTITIONER

## 2023-06-06 PROCEDURE — 1123F ACP DISCUSS/DSCN MKR DOCD: CPT | Performed by: NURSE PRACTITIONER

## 2023-06-06 RX ORDER — DIAZEPAM 5 MG/1
5 TABLET ORAL EVERY 12 HOURS PRN
Qty: 30 TABLET | Refills: 2 | Status: SHIPPED | OUTPATIENT
Start: 2023-06-06 | End: 2023-07-21

## 2023-06-06 RX ORDER — ROSUVASTATIN CALCIUM 20 MG/1
20 TABLET, COATED ORAL NIGHTLY
Qty: 90 TABLET | Refills: 1 | Status: SHIPPED | OUTPATIENT
Start: 2023-06-06

## 2023-06-06 ASSESSMENT — PATIENT HEALTH QUESTIONNAIRE - PHQ9
SUM OF ALL RESPONSES TO PHQ QUESTIONS 1-9: 0
SUM OF ALL RESPONSES TO PHQ9 QUESTIONS 1 & 2: 0
SUM OF ALL RESPONSES TO PHQ QUESTIONS 1-9: 0
2. FEELING DOWN, DEPRESSED OR HOPELESS: 0
1. LITTLE INTEREST OR PLEASURE IN DOING THINGS: 0

## 2023-06-06 ASSESSMENT — ENCOUNTER SYMPTOMS
SINUS PAIN: 0
NAUSEA: 0
COLOR CHANGE: 0
FACIAL SWELLING: 0
SORE THROAT: 0
DIARRHEA: 0
COUGH: 0
EYE PAIN: 0
VOMITING: 0
BACK PAIN: 0
WHEEZING: 0
TROUBLE SWALLOWING: 0
SHORTNESS OF BREATH: 0
ABDOMINAL PAIN: 0

## 2023-06-06 ASSESSMENT — LIFESTYLE VARIABLES
HOW OFTEN DO YOU HAVE A DRINK CONTAINING ALCOHOL: NEVER
HOW MANY STANDARD DRINKS CONTAINING ALCOHOL DO YOU HAVE ON A TYPICAL DAY: PATIENT DOES NOT DRINK

## 2023-06-06 NOTE — PROGRESS NOTES
Medicare Annual Wellness Visit    Dragan Oconnor is here for Medicare AWV    Assessment & Plan   Medicare annual wellness visit, subsequent  Mixed hyperlipidemia  -     rosuvastatin (CRESTOR) 20 MG tablet; Take 1 tablet by mouth nightly, Disp-90 tablet, R-1Normal  -     Lipid Panel; Future  -     Comprehensive Metabolic Panel; Future  Anxiety  -     diazePAM (VALIUM) 5 MG tablet; Take 1 tablet by mouth every 12 hours as needed for Anxiety for up to 45 days. Max Daily Amount: 10 mg, Disp-30 tablet, R-2Normal  Colon cancer screening  -     AFL - Ezequiel Butcher MD, Gastroenterology, [facility] [place]  Pulmonary emphysema, unspecified emphysema type [facility])    Recommendations for Preventive Services Due: see orders and patient instructions/AVS.  Recommended screening schedule for the next 5-10 years is provided to the patient in written form: see Patient Instructions/AVS.     Return in 6 months (on 12/6/2023), or if symptoms worsen or fail to improve, for Routine follow up. Subjective       Patient's complete Health Risk Assessment and screening values have been reviewed and are found in Flowsheets. The following problems were reviewed today and where indicated follow up appointments were made and/or referrals ordered. Positive Risk Factor Screenings with Interventions:                    Vision Screen:  Do you have difficulty driving, watching TV, or doing any of your daily activities because of your eyesight?: No  Have you had an eye exam within the past year?: (!) No  No results found. - Schedule eye exam ASAP. Safety:  Do you have either shower bars, grab bars, non-slip mats or non-slip surfaces in your shower or bathtub?: (!) No    - Home safety tips provided. Lung Cancer Screening: Pt is scheduled for 6/8/23 for the screening.      LDCT Screening: Discussed with patient the benefits and harms of screening, follow-up diagnostic testing, over-diagnosis, false positive rate, and total radiation
Referral to [facility] placed. Faxed on 6/6/23 with [facility] health referral form, insurance card, and office note. They will call patient to schedule.     Fax: [phone]  Phone: [phone]
Gastroenterology, [facility] [place]    - Continue current dose of Crestor and repeat labs in 6 months. - Work on diet and exercise. - OK for refill on Valium, pt dealing with her bothers estate and this helps her sleep. OK to use BID PRN. - Referral to GI for Colonoscopy. Controlled Substance Monitoring:    Acute and Chronic Pain Monitoring:   RX Monitoring 6/6/2023   Attestation -   Periodic Controlled Substance Monitoring Possible medication side effects, risk of tolerance/dependence & alternative treatments discussed. ;No signs of potential drug abuse or diversion identified. ;Assessed functional status. Return in 6 months (on 12/6/2023), or if symptoms worsen or fail to improve, for Routine follow up. Patient given educational materials - see patient instructions. Discussed use, benefit, and side effects of prescribed medications. All patient questions answered. Pt voiced understanding. Reviewed health maintenance. An electronic signature was used to authenticate this note.     --MARVEL Smith - CNP on 6/6/2023 at 2:34 PM

## 2023-06-07 ENCOUNTER — TELEPHONE (OUTPATIENT)
Dept: PULMONOLOGY | Age: 73
End: 2023-06-07

## 2023-06-07 DIAGNOSIS — Z87.891 PERSONAL HISTORY OF TOBACCO USE, PRESENTING HAZARDS TO HEALTH: Primary | ICD-10-CM

## 2023-06-07 NOTE — TELEPHONE ENCOUNTER
Rachele Schultz out of office, patient scheduled to get test done tomorrow and order is , can you please place new order?

## 2023-06-23 ENCOUNTER — HOSPITAL ENCOUNTER (OUTPATIENT)
Dept: CT IMAGING | Age: 73
Discharge: HOME | End: 2023-06-23
Payer: MEDICARE

## 2023-06-23 DIAGNOSIS — Z87.891 PERSONAL HISTORY OF TOBACCO USE: ICD-10-CM

## 2023-06-23 PROCEDURE — 71271 CT THORAX LUNG CANCER SCR C-: CPT

## 2023-06-27 ENCOUNTER — TELEPHONE (OUTPATIENT)
Dept: PULMONOLOGY | Age: 73
End: 2023-06-27

## 2023-07-10 NOTE — TELEPHONE ENCOUNTER
Third time phoning patient to reschedule office visit with Enrike Farm. No answer, left message to call office to get scheduled.

## 2023-11-20 ENCOUNTER — HOSPITAL ENCOUNTER (OUTPATIENT)
Dept: GENERAL RADIOLOGY | Age: 73
Discharge: HOME | End: 2023-11-20
Payer: MEDICARE

## 2023-11-20 ENCOUNTER — TELEPHONE (OUTPATIENT)
Dept: FAMILY MEDICINE CLINIC | Age: 73
End: 2023-11-20

## 2023-11-20 ENCOUNTER — HOSPITAL ENCOUNTER (OUTPATIENT)
Age: 73
Discharge: HOME | End: 2023-11-20
Payer: MEDICARE

## 2023-11-20 DIAGNOSIS — M25.571 ACUTE RIGHT ANKLE PAIN: ICD-10-CM

## 2023-11-20 DIAGNOSIS — M25.571 ACUTE RIGHT ANKLE PAIN: Primary | ICD-10-CM

## 2023-11-20 PROCEDURE — 73610 X-RAY EXAM OF ANKLE: CPT

## 2023-11-20 NOTE — TELEPHONE ENCOUNTER
Pt called in stating that on Saturday she missed a step going into the basement and she fell and possibly twisted her right ankle. Pt states that she is having some swelling and pain along with some bruising to the area. Pt is wanting an order for an xray to get completed today so she can discuss results at appt tomorrow since we have no openings today. Pt has appt tomorrow with WS at 2:30 PM. Pt will [facility] to get test completed. Please advise. Pt would like a call back at [phone].

## 2023-11-22 ENCOUNTER — OFFICE VISIT (OUTPATIENT)
Dept: FAMILY MEDICINE CLINIC | Age: 73
End: 2023-11-22

## 2023-11-22 VITALS
HEART RATE: 84 BPM | BODY MASS INDEX: 24.37 KG/M2 | WEIGHT: 142 LBS | SYSTOLIC BLOOD PRESSURE: 158 MMHG | DIASTOLIC BLOOD PRESSURE: 72 MMHG

## 2023-11-22 DIAGNOSIS — H02.89 IRRITATION OF EYELID: ICD-10-CM

## 2023-11-22 DIAGNOSIS — I10 ESSENTIAL HYPERTENSION: Primary | ICD-10-CM

## 2023-11-22 DIAGNOSIS — K11.8 PAROTID GLAND PAIN: ICD-10-CM

## 2023-11-22 DIAGNOSIS — I49.3 PVC (PREMATURE VENTRICULAR CONTRACTION): ICD-10-CM

## 2023-11-22 DIAGNOSIS — E78.2 MIXED HYPERLIPIDEMIA: ICD-10-CM

## 2023-11-22 RX ORDER — ROSUVASTATIN CALCIUM 20 MG/1
20 TABLET, COATED ORAL NIGHTLY
Qty: 90 TABLET | Refills: 1 | Status: CANCELLED | OUTPATIENT
Start: 2023-11-22

## 2023-11-22 RX ORDER — DIAZEPAM 5 MG/1
5 TABLET ORAL EVERY 6 HOURS PRN
COMMUNITY
Start: 2023-10-10 | End: 2023-11-22 | Stop reason: ALTCHOICE

## 2023-11-22 RX ORDER — CEPHALEXIN 500 MG/1
500 CAPSULE ORAL 4 TIMES DAILY
Qty: 28 CAPSULE | Refills: 0 | Status: SHIPPED | OUTPATIENT
Start: 2023-11-22 | End: 2023-11-29

## 2023-11-22 RX ORDER — LISINOPRIL 20 MG/1
20 TABLET ORAL DAILY
Qty: 90 TABLET | Refills: 1 | Status: SHIPPED | OUTPATIENT
Start: 2023-11-22

## 2023-11-22 ASSESSMENT — ENCOUNTER SYMPTOMS
ANAL BLEEDING: 0
NAUSEA: 0
COUGH: 0
CONSTIPATION: 0
SORE THROAT: 0
BLOOD IN STOOL: 0
ABDOMINAL PAIN: 0
EYE DISCHARGE: 0
DIARRHEA: 0
EYE REDNESS: 0
ABDOMINAL DISTENTION: 0
COLOR CHANGE: 0
SHORTNESS OF BREATH: 0
RHINORRHEA: 0

## 2023-11-22 NOTE — PATIENT INSTRUCTIONS
Lisinopril 20 mg daily  Recheck labs before next visit  Tobradex for eyelids  Antibiotic for the swelling - if no better may get ultrasound

## 2023-12-01 LAB
ALBUMIN SERPL-MCNC: 4.5 G/DL (ref 3.5–5.2)
ALK PHOSPHATASE: 113 U/L (ref 40–142)
ALT SERPL-CCNC: 23 U/L (ref 5–40)
ANION GAP SERPL CALCULATED.3IONS-SCNC: 10 MEQ/L (ref 7–16)
AST SERPL-CCNC: 22 U/L (ref 9–40)
BILIRUB SERPL-MCNC: 0.8 MG/DL
BUN BLDV-MCNC: 12 MG/DL (ref 8–23)
CALCIUM SERPL-MCNC: 9.9 MG/DL (ref 8.5–10.5)
CHLORIDE BLD-SCNC: 104 MEQ/L (ref 95–107)
CO2: 27 MEQ/L (ref 19–31)
CREAT SERPL-MCNC: 0.81 MG/DL (ref 0.6–1.3)
EGFR IF NONAFRICAN AMERICAN: 77 ML/MIN/1.73
GLUCOSE: 146 MG/DL (ref 70–99)
POTASSIUM SERPL-SCNC: 5.3 MEQ/L (ref 3.5–5.4)
SODIUM BLD-SCNC: 141 MEQ/L (ref 133–146)
TOTAL PROTEIN: 6.3 G/DL (ref 6.1–8.3)

## 2023-12-04 ENCOUNTER — TELEPHONE (OUTPATIENT)
Dept: FAMILY MEDICINE CLINIC | Age: 73
End: 2023-12-04

## 2023-12-04 NOTE — TELEPHONE ENCOUNTER
----- Message from MARVEL Martinez - CNP sent at 12/4/2023 10:16 AM EST -----  Labs overall okay but sugar was elevated at 146 - was she fasting? Will discuss in more detail 12/6 - may need A1C in office if she was fasting for these labs.

## 2023-12-04 NOTE — TELEPHONE ENCOUNTER
Called and notified patient of results. Patient stated she did not fast as she did not know she was supposed to. Notified patient she can discuss results further at appointment on 12/6/23.

## 2023-12-06 ENCOUNTER — OFFICE VISIT (OUTPATIENT)
Dept: FAMILY MEDICINE CLINIC | Age: 73
End: 2023-12-06
Payer: MEDICARE

## 2023-12-06 VITALS
HEART RATE: 84 BPM | RESPIRATION RATE: 16 BRPM | WEIGHT: 141.6 LBS | TEMPERATURE: 98.1 F | DIASTOLIC BLOOD PRESSURE: 74 MMHG | SYSTOLIC BLOOD PRESSURE: 130 MMHG | BODY MASS INDEX: 24.31 KG/M2

## 2023-12-06 DIAGNOSIS — F41.9 ANXIETY: ICD-10-CM

## 2023-12-06 DIAGNOSIS — I10 ESSENTIAL HYPERTENSION: ICD-10-CM

## 2023-12-06 DIAGNOSIS — E78.2 MIXED HYPERLIPIDEMIA: Primary | ICD-10-CM

## 2023-12-06 DIAGNOSIS — J43.9 PULMONARY EMPHYSEMA, UNSPECIFIED EMPHYSEMA TYPE (HCC): ICD-10-CM

## 2023-12-06 PROCEDURE — 1123F ACP DISCUSS/DSCN MKR DOCD: CPT | Performed by: NURSE PRACTITIONER

## 2023-12-06 PROCEDURE — 99214 OFFICE O/P EST MOD 30 MIN: CPT | Performed by: NURSE PRACTITIONER

## 2023-12-06 PROCEDURE — 3078F DIAST BP <80 MM HG: CPT | Performed by: NURSE PRACTITIONER

## 2023-12-06 PROCEDURE — 3074F SYST BP LT 130 MM HG: CPT | Performed by: NURSE PRACTITIONER

## 2023-12-06 RX ORDER — DIAZEPAM 5 MG/1
5 TABLET ORAL EVERY 8 HOURS PRN
Qty: 30 TABLET | Refills: 0 | Status: SHIPPED | OUTPATIENT
Start: 2023-12-06 | End: 2024-01-05

## 2023-12-06 ASSESSMENT — ENCOUNTER SYMPTOMS
COUGH: 0
EYES NEGATIVE: 1
ABDOMINAL PAIN: 0
BLOOD IN STOOL: 0

## 2023-12-06 NOTE — PROGRESS NOTES
[address] [address]  Dept: [phone]  Dept Fax: [phone]    Nurse Practitioner Student Note   SUBJECTIVE     [place] blake 67 y. o.female    Chief Complaint   Patient presents with    6 Month Follow-Up     6 month follow up for HTN, mixed hyperlipidemia, and anxiety. Discuss Labs     Discuss lab completed on 12/1/23. Patient presents for two week follow-up for hypertension and heart palpitations. Patient was started on lisinopril 20 mg at last visit on 11/22/23. Patient reports that she has had some heart palpitations and a lot of fatigue recently, but has been under a lot of stress. The patient reports no side effects with lisinopril. She is no longer having any dizziness. Hypertension  This is a new problem. The current episode started 1 to 4 weeks ago. The problem has been gradually improving since onset. The problem is controlled. Associated symptoms include anxiety and palpitations. Pertinent negatives include no chest pain, headaches or peripheral edema. There are no associated agents to hypertension. Risk factors for coronary artery disease include dyslipidemia, post-menopausal state and smoking/tobacco exposure. Past treatments include nothing. There are no compliance problems. There is no history of angina, kidney disease, CAD/MI, CVA or heart failure. There is no history of chronic renal disease. Hyperlipidemia  This is a chronic problem. The current episode started more than 1 year ago. The problem is controlled. Exacerbating diseases include obesity. She has no history of chronic renal disease, diabetes, hypothyroidism or liver disease. Factors aggravating her hyperlipidemia include smoking. Pertinent negatives include no chest pain. Current antihyperlipidemic treatment includes statins. There are no compliance problems.   Risk factors for coronary artery disease include hypertension and post-menopausal.   Anxiety  Presents for

## 2024-02-29 ENCOUNTER — TRANSCRIBE ORDERS (OUTPATIENT)
Dept: ADMINISTRATIVE | Age: 74
End: 2024-02-29

## 2024-02-29 DIAGNOSIS — I15.9 SECONDARY HYPERTENSION: Primary | ICD-10-CM

## 2024-03-07 ENCOUNTER — OFFICE VISIT (OUTPATIENT)
Dept: CARDIOLOGY CLINIC | Age: 74
End: 2024-03-07
Payer: MEDICARE

## 2024-03-07 ENCOUNTER — TELEPHONE (OUTPATIENT)
Dept: CARDIOLOGY CLINIC | Age: 74
End: 2024-03-07

## 2024-03-07 VITALS
HEIGHT: 64 IN | BODY MASS INDEX: 24.04 KG/M2 | OXYGEN SATURATION: 99 % | SYSTOLIC BLOOD PRESSURE: 118 MMHG | DIASTOLIC BLOOD PRESSURE: 75 MMHG | HEART RATE: 88 BPM | WEIGHT: 140.8 LBS

## 2024-03-07 DIAGNOSIS — I10 PRIMARY HYPERTENSION: ICD-10-CM

## 2024-03-07 DIAGNOSIS — R00.2 PALPITATIONS: Primary | ICD-10-CM

## 2024-03-07 PROCEDURE — 3074F SYST BP LT 130 MM HG: CPT | Performed by: NURSE PRACTITIONER

## 2024-03-07 PROCEDURE — 93000 ELECTROCARDIOGRAM COMPLETE: CPT | Performed by: NURSE PRACTITIONER

## 2024-03-07 PROCEDURE — 99214 OFFICE O/P EST MOD 30 MIN: CPT | Performed by: NURSE PRACTITIONER

## 2024-03-07 PROCEDURE — 3078F DIAST BP <80 MM HG: CPT | Performed by: NURSE PRACTITIONER

## 2024-03-07 PROCEDURE — 1123F ACP DISCUSS/DSCN MKR DOCD: CPT | Performed by: NURSE PRACTITIONER

## 2024-03-07 RX ORDER — LISINOPRIL AND HYDROCHLOROTHIAZIDE 20; 12.5 MG/1; MG/1
1 TABLET ORAL DAILY
COMMUNITY

## 2024-03-07 NOTE — PROGRESS NOTES
event monitor back I will let her know how that looks.      We will see her back in 3 months time to see if she has resolution of her issues or if we should treat her symptomatically.      Uncontrolled HTN - seen at [facility] - added HCTZ to lisinopril     Palpitations - 72 hr holter pending   - recent sinus infection     Previous seen Dante for tachycardia   Last event 2021 - SR rare pac / pvc          Negative stress 2021    Known LBBB     Diagnosis Orders   1. Palpitations  Echo (TTE) complete (PRN contrast/bubble/strain/3D)    EKG 12 lead      2. Primary hypertension  lisinopril-hydroCHLOROthiazide (PRINZIDE;ZESTORETIC) 20-12.5 MG per tablet          Orders Placed This Encounter   Procedures    EKG 12 lead     Order Specific Question:   Reason for Exam?     Answer:   Other     Continue Dr Howell's current treatment plan    There are no Patient Instructions on file for this visit.    Return in about 3 months (around 6/7/2024), or if symptoms worsen or fail to improve.    Follow up with Dr Howell as scheduled or sooner if needed    Lavinia Donis, MARVEL - CNP

## 2024-03-11 NOTE — TELEPHONE ENCOUNTER
Apt with Lavinia 03.07.2024  Echo 03.12.2024  Follow up with Elijah 06.12.2024    Monitor results were not available at time of apt    Please see results- Scanned in the  chart/ on your desk for review  please advise.

## 2024-03-12 ENCOUNTER — HOSPITAL ENCOUNTER (OUTPATIENT)
Age: 74
Discharge: HOME | End: 2024-03-14
Payer: MEDICARE

## 2024-03-12 DIAGNOSIS — R00.2 PALPITATIONS: ICD-10-CM

## 2024-03-12 LAB
ECHO AO ASC DIAM: 3.1 CM
ECHO AV CUSP MM: 1.4 CM
ECHO AV PEAK GRADIENT: 6 MMHG
ECHO AV PEAK VELOCITY: 1.2 M/S
ECHO AV VELOCITY RATIO: 0.83
ECHO LA AREA 2C: 11.2 CM2
ECHO LA AREA 4C: 9 CM2
ECHO LA DIAMETER: 3.1 CM
ECHO LA MAJOR AXIS: 3.7 CM
ECHO LA MINOR AXIS: 3.9 CM
ECHO LA VOL BP: 22 ML (ref 22–52)
ECHO LA VOL MOD A2C: 26 ML (ref 22–52)
ECHO LA VOL MOD A4C: 17 ML (ref 22–52)
ECHO LV E' LATERAL VELOCITY: 6 CM/S
ECHO LV E' SEPTAL VELOCITY: 5 CM/S
ECHO LV FRACTIONAL SHORTENING: 29 % (ref 28–44)
ECHO LV INTERNAL DIMENSION DIASTOLIC: 4.5 CM (ref 3.9–5.3)
ECHO LV INTERNAL DIMENSION SYSTOLIC: 3.2 CM
ECHO LV ISOVOLUMETRIC RELAXATION TIME (IVRT): 85 MS
ECHO LV IVSD: 1 CM (ref 0.6–0.9)
ECHO LV MASS 2D: 153.3 G (ref 67–162)
ECHO LV POSTERIOR WALL DIASTOLIC: 1 CM (ref 0.6–0.9)
ECHO LV RELATIVE WALL THICKNESS RATIO: 0.44
ECHO LVOT PEAK GRADIENT: 4 MMHG
ECHO LVOT PEAK VELOCITY: 1 M/S
ECHO MV A VELOCITY: 0.85 M/S
ECHO MV E DECELERATION TIME (DT): 183 MS
ECHO MV E VELOCITY: 0.6 M/S
ECHO MV E/A RATIO: 0.71
ECHO MV E/E' LATERAL: 10
ECHO MV E/E' RATIO (AVERAGED): 11
ECHO PV MAX VELOCITY: 0.7 M/S
ECHO PV PEAK GRADIENT: 2 MMHG
ECHO RV INTERNAL DIMENSION: 2.2 CM
ECHO RV TAPSE: 2.4 CM (ref 1.7–?)
ECHO TV E WAVE: 0.6 M/S

## 2024-03-12 PROCEDURE — 93306 TTE W/DOPPLER COMPLETE: CPT

## 2024-03-12 PROCEDURE — 93306 TTE W/DOPPLER COMPLETE: CPT | Performed by: NUCLEAR MEDICINE

## 2024-03-15 ENCOUNTER — OFFICE VISIT (OUTPATIENT)
Dept: FAMILY MEDICINE CLINIC | Age: 74
End: 2024-03-15
Payer: MEDICARE

## 2024-03-15 ENCOUNTER — HOSPITAL ENCOUNTER (OUTPATIENT)
Age: 74
Discharge: HOME | End: 2024-03-15
Payer: MEDICARE

## 2024-03-15 ENCOUNTER — HOSPITAL ENCOUNTER (OUTPATIENT)
Dept: GENERAL RADIOLOGY | Age: 74
Discharge: HOME | End: 2024-03-15
Payer: MEDICARE

## 2024-03-15 VITALS
HEART RATE: 88 BPM | WEIGHT: 139.2 LBS | RESPIRATION RATE: 16 BRPM | SYSTOLIC BLOOD PRESSURE: 128 MMHG | TEMPERATURE: 97.8 F | BODY MASS INDEX: 23.89 KG/M2 | DIASTOLIC BLOOD PRESSURE: 60 MMHG

## 2024-03-15 DIAGNOSIS — R05.1 ACUTE COUGH: ICD-10-CM

## 2024-03-15 DIAGNOSIS — J40 BRONCHITIS: Primary | ICD-10-CM

## 2024-03-15 DIAGNOSIS — J40 BRONCHITIS: ICD-10-CM

## 2024-03-15 PROCEDURE — 1123F ACP DISCUSS/DSCN MKR DOCD: CPT | Performed by: NURSE PRACTITIONER

## 2024-03-15 PROCEDURE — 99213 OFFICE O/P EST LOW 20 MIN: CPT | Performed by: NURSE PRACTITIONER

## 2024-03-15 PROCEDURE — 71046 X-RAY EXAM CHEST 2 VIEWS: CPT

## 2024-03-15 RX ORDER — CETIRIZINE HYDROCHLORIDE 10 MG/1
10 TABLET ORAL DAILY
Qty: 30 TABLET | Refills: 1 | Status: SHIPPED | OUTPATIENT
Start: 2024-03-15

## 2024-03-15 RX ORDER — AZITHROMYCIN 250 MG/1
TABLET, FILM COATED ORAL
Qty: 6 TABLET | Refills: 0 | Status: SHIPPED | OUTPATIENT
Start: 2024-03-15 | End: 2024-03-25

## 2024-03-15 RX ORDER — DEXTROMETHORPHAN HYDROBROMIDE AND PROMETHAZINE HYDROCHLORIDE 15; 6.25 MG/5ML; MG/5ML
5 SYRUP ORAL 4 TIMES DAILY PRN
Qty: 118 ML | Refills: 0 | Status: SHIPPED | OUTPATIENT
Start: 2024-03-15 | End: 2024-03-22

## 2024-03-15 ASSESSMENT — ENCOUNTER SYMPTOMS
SINUS PRESSURE: 1
SINUS COMPLAINT: 1
COUGH: 1
SHORTNESS OF BREATH: 0
RHINORRHEA: 1
SORE THROAT: 0
HEARTBURN: 0
HEMOPTYSIS: 0
HOARSE VOICE: 0

## 2024-03-15 ASSESSMENT — PATIENT HEALTH QUESTIONNAIRE - PHQ9
2. FEELING DOWN, DEPRESSED OR HOPELESS: 0
SUM OF ALL RESPONSES TO PHQ QUESTIONS 1-9: 0
1. LITTLE INTEREST OR PLEASURE IN DOING THINGS: 0
SUM OF ALL RESPONSES TO PHQ QUESTIONS 1-9: 0
SUM OF ALL RESPONSES TO PHQ QUESTIONS 1-9: 0
SUM OF ALL RESPONSES TO PHQ9 QUESTIONS 1 & 2: 0
SUM OF ALL RESPONSES TO PHQ QUESTIONS 1-9: 0

## 2024-03-15 NOTE — PROGRESS NOTES
present.      Comments: Congested cough.   Abdominal:      General: Bowel sounds are normal.      Palpations: Abdomen is soft.      Tenderness: There is no abdominal tenderness.   Musculoskeletal:      Cervical back: Full passive range of motion without pain and neck supple.   Lymphadenopathy:      Head:      Right side of head: No submental, submandibular, tonsillar, preauricular, posterior auricular or occipital adenopathy.      Left side of head: No submental, submandibular, tonsillar, preauricular, posterior auricular or occipital adenopathy.      Cervical: No cervical adenopathy.   Skin:     General: Skin is warm and dry.      Findings: No rash.   Neurological:      General: No focal deficit present.      Mental Status: She is alert and oriented to person, place, and time.      Coordination: Coordination normal.   Psychiatric:         Behavior: Behavior normal.         Thought Content: Thought content normal.         Judgment: Judgment normal.         Assessment/Plan:      Susie was seen today for sinus problem, congestion, cough, fatigue and diarrhea.    Diagnoses and all orders for this visit:    Bronchitis  -     azithromycin (ZITHROMAX) 250 MG tablet; 500mg on day 1 followed by 250mg on days 2 - 5  -     XR CHEST (2 VW); Future  -     cetirizine (ZYRTEC) 10 MG tablet; Take 1 tablet by mouth daily    Acute cough  -     promethazine-dextromethorphan (PROMETHAZINE-DM) 6.25-15 MG/5ML syrup; Take 5 mLs by mouth 4 times daily as needed for Cough  -     XR CHEST (2 VW); Future    - Rest and increase fluids  - Tylenol as needed for pain and fever  - chest x-ray today.    - Good handwashing and clean all surfaces touched  - Call office with any questions or concerns, or if symptoms are getting worse or changing  - ER for any chest pain or SOB      Return if symptoms worsen or fail to improve.    Patient given educational materials - see patient instructions.  Discussed use, benefit, and side effects of prescribed

## 2024-03-18 ENCOUNTER — TELEPHONE (OUTPATIENT)
Dept: FAMILY MEDICINE CLINIC | Age: 74
End: 2024-03-18

## 2024-03-18 RX ORDER — CEFDINIR 300 MG/1
300 CAPSULE ORAL 2 TIMES DAILY
Qty: 20 CAPSULE | Refills: 0 | Status: SHIPPED | OUTPATIENT
Start: 2024-03-18 | End: 2024-03-28

## 2024-03-18 NOTE — TELEPHONE ENCOUNTER
Pt notified and verbalized understanding. Pt will stop Zpak and will start the Omnicef once she picks it up. Pt will let us know if she is not better.

## 2024-03-18 NOTE — TELEPHONE ENCOUNTER
Pt notified of results and states that she is still congested and ears are congested and only 1 day left of zpak and states that she is not feeling better.

## 2024-03-18 NOTE — TELEPHONE ENCOUNTER
Noted.  Stop Z-pack and start Omnicef 300 mg BID x 10 days.  Call office with any questions or concerns, or if symptoms are getting worse or changing. -WS    Orders Placed This Encounter   Medications    cefdinir (OMNICEF) 300 MG capsule     Sig: Take 1 capsule by mouth 2 times daily for 10 days     Dispense:  20 capsule     Refill:  0

## 2024-03-18 NOTE — TELEPHONE ENCOUNTER
----- Message from MARVEL Christianson CNP sent at 3/18/2024  7:48 AM EDT -----  Please call pt and let her know that her chest x-ray did not show any pneumonia.  See how she is feeling on the Z-pack.  Finish the antibiotic and call office if not improving.  No further chest x-rays are needed. -ABEL

## 2024-05-12 DIAGNOSIS — I10 ESSENTIAL HYPERTENSION: ICD-10-CM

## 2024-05-13 RX ORDER — LISINOPRIL 20 MG/1
20 TABLET ORAL DAILY
Qty: 90 TABLET | Refills: 1 | OUTPATIENT
Start: 2024-05-13

## 2024-05-13 NOTE — TELEPHONE ENCOUNTER
John Guzmán is a 26 year old male who presents for Complete health and wellness exam.  He experienced a thunderclap type headache 2 days ago while working around the house and doing some heavy lifting.  Much better now, no other neuro deficits.    Patient Active Problem List   Diagnosis   • ОЛЕГ (generalized anxiety disorder)   • Annual physical exam   • Vitamin D deficiency   • Gastroesophageal reflux disease without esophagitis   • Allergy to cats   • Dyslipidemia   • Adult BMI 40.0-44.9 kg/sq m (CMS/[facility])   • ROULA (obstructive sleep apnea)       Past Medical History:   Diagnosis Date   • Attention deficit disorder without mention of hyperactivity 1/29/2015       Past Surgical History:   Procedure Laterality Date   • Knee arthroscopy w/ meniscectomy       right, and left       Current Outpatient Medications   Medication Sig Dispense Refill   • ALPRAZolam (XANAX) 0.5 MG tablet Take 1 tablet by mouth 3 times daily as needed (panic attack). 10 tablet 0   • venlafaxine XR (EFFEXOR XR) 75 MG 24 hr capsule Take 3 capsules by mouth daily. 270 capsule 0   • omeprazole (PrilOSEC) 20 MG capsule TAKE ONE CAPSULE BY MOUTH TWICE DAILY, 1/2 TO 1 HOUR BEFORE A MEAL. 180 capsule 1   • hydrOXYzine (ATARAX) 25 MG tablet Take 1/2 to 1 tablet by mouth every 4 to 6 hours as needed for anxiety     • Cholecalciferol (VITAMIN D) 50 mcg (2,000 units) tablet Take 1 tablet by mouth daily. 1 tablet 1     No current facility-administered medications for this visit.       ALLERGIES:   Allergen Reactions   • Cat Dander Other (See Comments)     Cold symptoms   • Ragweed Other (See Comments)     Pt unsure of reactions. Pt was allergy test years ago and reports being allergic to ragweed.       HEALTH MAINTENANCE:  He is exercising regularly.  Tdap today after discussion, flu shot declined    Social History     Socioeconomic History   • Marital status: Single     Spouse name: Not on file   • Number of children: Not on file   • Years of education:  Request sent from [facility] pharmacy for refill of lisinopril 20 mg qd.  Last seen 3/15/24 for acute visit, next appt 6/10/24.  Contacted pt as lisinopril not on active list.  She states that she is currently on the combo.  This request denied.    Not on file   • Highest education level: Not on file   Occupational History   • Occupation: Student at [employer]   Tobacco Use   • Smoking status: Never Smoker   • Smokeless tobacco: Never Used   Substance and Sexual Activity   • Alcohol use: No   • Drug use: No   • Sexual activity: Never   Other Topics Concern   • Not on file   Social History Narrative   • Not on file     Social Determinants of Health     Financial Resource Strain: Not on file   Food Insecurity: Not on file   Transportation Needs: Not on file   Physical Activity: Not on file   Stress: Not on file   Social Connections: Not on file   Intimate Partner Violence: Not on file       family history includes Anxiety disorder in his father; Cancer in his mother.    REVIEW OF SYSTEMS:    CONSTITUTIONAL:  No fevers, chills, weight loss.   EYES:  No visual changes or diplopia.    ENT:  No nasal congestion or sore throat.   CARDIOVASCULAR:  No chest pain, palpitations, PND (paroxysmal nocturnal dyspnea) or orthopnea.  RESPIRATORY:  No cough, chest congestion or hemoptysis.   GASTROINTESTINAL:  No abdominal pain, diarrhea, nausea or vomiting.  GENITOURINARY: No dysuria, hematuria.     MUSCULOSKELETAL:  No hot, red or swollen joints.  SKIN:  No rashes or concerning skin lesions.  NEUROLOGIC: No stroke or TIA (transient ischemic attack) symptoms, numbness or weakness of an extremity.    PSYCHIATRIC:  No blue moods, sadness.  No anxious moods.    ENDOCRINE:  No polyuria or polydipsia or polyphagia, skin, hair or nail changes, heat or cold intolerance.    HEMATOLOGIC AND LYMPHATIC:  No swollen lymph nodes or night sweats.      PHYSICAL EXAM    Visit Vitals  /80   Pulse (!) 104   Ht 6' 1\" (1.854 m)   Wt (!) 138.2 kg (304 lb 10.8 oz)   SpO2 96%   BMI 40.20 kg/m²       BP Readings from Last 2 Encounters:   09/14/22 126/80   07/21/21 136/84       Wt Readings from Last 2 Encounters:   09/14/22 (!) 138.2 kg (304 lb 10.8 oz)   07/21/21 (!) 143 kg (315 lb 4.1  oz)         GENERAL: He is alert and cooperative in no acute distress.    HEENT: His conjunctivae are clear. Extraocular movements are intact. Pupils are equal round, reactive to light and accommodation. Fundi benign. Discs flat. The sinuses are nontender. Tympanic membranes are normal.  Nose and throat exam is deferred due to COVID-19.    NECK: Supple without JVD, thyromegaly or mass.    LUNGS: Clear to auscultation with good excursion.    CARDIOVASCULAR: PMI nondisplaced. Regular rhythm without murmur, rub or gallop.    ABDOMEN: Soft, benign, nontender. No hepatosplenomegaly or mass.    LYMPH: No cervical, or supraclavicular adenopathy.    EXTREMITIES: No cyanosis, clubbing or edema.    MUSCULOSKELETAL: Gait and station are normal. There is no clubbing of the digits or hemorrhages of the nails. There is no evidence of wrist or hand synovitis.    SKIN: Warm and dry. No rashes, no jaundice.  No concerning skin lesions.    NEUROLOGIC: Cranial nerves II through XII are intact and nonfocal. DTRs (deep tendon reflexes) are 2+/ 4+ patellar .    PSYCHIATRIC: Alert, and oriented to time, place and person. Mood and affect are normal.    ASSESSMENTS AND PLANS:    1.  HEALTH MAINTENANCE: See the after visit summary for recommendations on health and wellness.  See orders for other recommendations on necessary screening procedures and screening labs..  Follow up with me in one year.    Problem List Items Addressed This Visit        Cardiac and Vasculature    Dyslipidemia    Relevant Orders    LIPID PANEL WITH REFLEX       Endocrine and Metabolic    Adult BMI 40.0-44.9 kg/sq m (CMS/HCC)    Relevant Orders    GLYCOHEMOGLOBIN    Vitamin D deficiency    Relevant Orders    VITAMIN D -25 HYDROXY       Health Encounters    Annual physical exam - Primary    Relevant Orders    LIPID PANEL WITH REFLEX    GLYCOHEMOGLOBIN    VITAMIN D -25 HYDROXY       Mental Health    ОЛЕГ (generalized anxiety disorder)    Relevant Medications     ALPRAZolam (XANAX) 0.5 MG tablet       Sleep    ROULA (obstructive sleep apnea)    Relevant Orders    SERVICE TO SLEEP MEDICINE      Other Visit Diagnoses     Thunderclap headache    : Stat CT head to rule out bleed.    Relevant Orders    CT HEAD WO CONTRAST           Except for the changes as noted above, these have all been it assessed as stable problems. Continue medications as per med list. Blood tests as per orders. Follow up as per the after visit summary

## 2024-05-22 DIAGNOSIS — I10 PRIMARY HYPERTENSION: ICD-10-CM

## 2024-05-22 RX ORDER — LISINOPRIL AND HYDROCHLOROTHIAZIDE 20; 12.5 MG/1; MG/1
1 TABLET ORAL DAILY
Qty: 30 TABLET | Refills: 1 | Status: SHIPPED | OUTPATIENT
Start: 2024-05-22

## 2024-05-22 NOTE — TELEPHONE ENCOUNTER
This medication refill is regarding a telephone request. Refill requested by patient. Requesting a 30 day supply.     Requested Prescriptions     Pending Prescriptions Disp Refills    lisinopril-hydroCHLOROthiazide (PRINZIDE;ZESTORETIC) 20-12.5 MG per tablet 30 tablet 1     Sig: Take 1 tablet by mouth daily       Date of last visit: 3/15/2024   Date of next visit: 6/10/2024  Date of last refill: historical provider; pt states it was an ED physician at [facility]  [facility]: [facility]     Last CMP:   Lab Results   Component Value Date     03/01/2024    K 3.6 03/01/2024     03/01/2024    CO2 27 03/01/2024    BUN 16 03/01/2024    CREATININE 0.9 05/15/2024    GLUCOSE 99 03/01/2024    CALCIUM 9.30 03/01/2024    BILITOT 0.8 12/01/2023    ALKPHOS 113 12/01/2023    AST 22 12/01/2023    ALT 23 12/01/2023    LABGLOM 83 (A) 03/03/2020    AGRATIO 3.3 (H) 09/24/2017       Rx verified, ordered and set to EP.     WCP

## 2024-06-10 ENCOUNTER — OFFICE VISIT (OUTPATIENT)
Dept: FAMILY MEDICINE CLINIC | Age: 74
End: 2024-06-10
Payer: MEDICARE

## 2024-06-10 VITALS
HEART RATE: 70 BPM | RESPIRATION RATE: 16 BRPM | TEMPERATURE: 98.7 F | WEIGHT: 142.2 LBS | HEIGHT: 64 IN | SYSTOLIC BLOOD PRESSURE: 112 MMHG | BODY MASS INDEX: 24.28 KG/M2 | OXYGEN SATURATION: 97 % | DIASTOLIC BLOOD PRESSURE: 60 MMHG

## 2024-06-10 DIAGNOSIS — Z00.00 MEDICARE ANNUAL WELLNESS VISIT, SUBSEQUENT: Primary | ICD-10-CM

## 2024-06-10 DIAGNOSIS — Z12.31 ENCOUNTER FOR SCREENING MAMMOGRAM FOR MALIGNANT NEOPLASM OF BREAST: ICD-10-CM

## 2024-06-10 DIAGNOSIS — J40 BRONCHITIS: ICD-10-CM

## 2024-06-10 DIAGNOSIS — F41.9 ANXIETY: ICD-10-CM

## 2024-06-10 DIAGNOSIS — Z72.0 TOBACCO ABUSE: ICD-10-CM

## 2024-06-10 DIAGNOSIS — Z87.891 PERSONAL HISTORY OF TOBACCO USE: ICD-10-CM

## 2024-06-10 DIAGNOSIS — E78.2 MIXED HYPERLIPIDEMIA: ICD-10-CM

## 2024-06-10 DIAGNOSIS — I10 PRIMARY HYPERTENSION: ICD-10-CM

## 2024-06-10 DIAGNOSIS — J43.9 PULMONARY EMPHYSEMA, UNSPECIFIED EMPHYSEMA TYPE (HCC): ICD-10-CM

## 2024-06-10 PROCEDURE — 1123F ACP DISCUSS/DSCN MKR DOCD: CPT | Performed by: NURSE PRACTITIONER

## 2024-06-10 PROCEDURE — G0296 VISIT TO DETERM LDCT ELIG: HCPCS | Performed by: NURSE PRACTITIONER

## 2024-06-10 PROCEDURE — 3078F DIAST BP <80 MM HG: CPT | Performed by: NURSE PRACTITIONER

## 2024-06-10 PROCEDURE — G0439 PPPS, SUBSEQ VISIT: HCPCS | Performed by: NURSE PRACTITIONER

## 2024-06-10 PROCEDURE — 3074F SYST BP LT 130 MM HG: CPT | Performed by: NURSE PRACTITIONER

## 2024-06-10 RX ORDER — AZITHROMYCIN 250 MG/1
TABLET, FILM COATED ORAL
Qty: 6 TABLET | Refills: 0 | Status: SHIPPED | OUTPATIENT
Start: 2024-06-10 | End: 2024-06-12 | Stop reason: ALTCHOICE

## 2024-06-10 RX ORDER — ROSUVASTATIN CALCIUM 20 MG/1
20 TABLET, COATED ORAL NIGHTLY
Qty: 90 TABLET | Refills: 1 | Status: CANCELLED | OUTPATIENT
Start: 2024-06-10

## 2024-06-10 SDOH — ECONOMIC STABILITY: FOOD INSECURITY: WITHIN THE PAST 12 MONTHS, THE FOOD YOU BOUGHT JUST DIDN'T LAST AND YOU DIDN'T HAVE MONEY TO GET MORE.: NEVER TRUE

## 2024-06-10 SDOH — ECONOMIC STABILITY: FOOD INSECURITY: WITHIN THE PAST 12 MONTHS, YOU WORRIED THAT YOUR FOOD WOULD RUN OUT BEFORE YOU GOT MONEY TO BUY MORE.: NEVER TRUE

## 2024-06-10 SDOH — ECONOMIC STABILITY: INCOME INSECURITY: HOW HARD IS IT FOR YOU TO PAY FOR THE VERY BASICS LIKE FOOD, HOUSING, MEDICAL CARE, AND HEATING?: NOT HARD AT ALL

## 2024-06-10 ASSESSMENT — LIFESTYLE VARIABLES
HOW MANY STANDARD DRINKS CONTAINING ALCOHOL DO YOU HAVE ON A TYPICAL DAY: PATIENT DOES NOT DRINK
HOW OFTEN DO YOU HAVE A DRINK CONTAINING ALCOHOL: NEVER

## 2024-06-10 ASSESSMENT — PATIENT HEALTH QUESTIONNAIRE - PHQ9
1. LITTLE INTEREST OR PLEASURE IN DOING THINGS: SEVERAL DAYS
2. FEELING DOWN, DEPRESSED OR HOPELESS: NOT AT ALL
SUM OF ALL RESPONSES TO PHQ QUESTIONS 1-9: 1
SUM OF ALL RESPONSES TO PHQ QUESTIONS 1-9: 1
SUM OF ALL RESPONSES TO PHQ9 QUESTIONS 1 & 2: 1
SUM OF ALL RESPONSES TO PHQ QUESTIONS 1-9: 1
SUM OF ALL RESPONSES TO PHQ QUESTIONS 1-9: 1

## 2024-06-10 NOTE — PROGRESS NOTES
[facility] PROFESSIONAL SERVICES  [facility] [place] [address]   [address]   [facility] [address]   Dept: [phone]   Dept Fax: [phone]   Loc: [phone]      Chief Complaint   Patient presents with    Follow-up     Discuss recent cardiac testing     Cardiologist:  Dr. Howell  72 yo female presents for 3 month f/u. Hx of palpitations, Htn.     FATIGUE, thinks from lisinopril-hctz. Her sleep is improved since stopping crestor. Her breathing is okay. No chest pains. Her BP usually runs 110s/70s-80s she states. Her HR is 80s-90. Some dizziness at times, more fatigue than anything.     General:   No fever, no chills, no weight loss, no fatigue  Pulmonary:    No dyspnea, no wheezing  Cardiac:    Denies recent chest pain   GI:     No nausea or vomiting, no abdominal pain  Neuro:     No dizziness or light headedness  Musculoskeletal:  No recent active issues  Extremities:   No edema      Past Medical History:   Diagnosis Date    Anxiety     Hyperlipidemia     Kidney stones     Osteoarthritis     Pulmonary emphysema (HCC) 6/6/2023    Restless leg syndrome 2010    Tobacco abuse     Viral hepatitis A 1992       Allergies   Allergen Reactions    Prednisone Swelling     Tongue swelling    Sulfa Antibiotics      Flank pain       Current Outpatient Medications   Medication Sig Dispense Refill    PROAIR  (90 Base) MCG/ACT inhaler Inhale 2 puffs into the lungs every 4 hours as needed for Wheezing 1 each 2    lisinopril-hydroCHLOROthiazide (PRINZIDE;ZESTORETIC) 20-12.5 MG per tablet Take 1 tablet by mouth daily 30 tablet 1    cetirizine (ZYRTEC) 10 MG tablet Take 1 tablet by mouth daily (Patient taking differently: Take 1 tablet by mouth daily PRN) 30 tablet 1    zinc gluconate 50 MG tablet       vitamin C (ASCORBIC ACID) 500 MG tablet Take 1 tablet by mouth daily      ibuprofen (ADVIL;MOTRIN) 400 MG tablet Take 2 tablets by mouth every 6 hours as needed      VITAMIN D PO Take by mouth daily

## 2024-06-10 NOTE — PATIENT INSTRUCTIONS
1-2 years to screen for glaucoma; cataracts, macular degeneration, and other eye disorders.  A preventive dental visit is recommended every 6 months.  Try to get at least 150 minutes of exercise per week or 10,000 steps per day on a pedometer .  Order or download the FREE \"Exercise & Physical Activity: Your Everyday Guide\" from [employer]. Call 1-827.169.2107 or search [employer] online.  You need 6429-2235 mg of calcium and 7671-6641 IU of vitamin D per day. It is possible to meet your calcium requirement with diet alone, but a vitamin D supplement is usually necessary to meet this goal.  When exposed to the sun, use a sunscreen that protects against both UVA and UVB radiation with an SPF of 30 or greater. Reapply every 2 to 3 hours or after sweating, drying off with a towel, or swimming.  Always wear a seat belt when traveling in a car. Always wear a helmet when riding a bicycle or motorcycle.       Learning About Lung Cancer Screening  What is screening for lung cancer?     Lung cancer screening is a way to find some lung cancers early, before a person has any symptoms of the cancer.  Lung cancer screening may help those who have the highest risk for lung cancer--people age 50 and older who are or were heavy smokers. For most people, who aren't at increased risk, screening for lung cancer probably isn't helpful.  Screening won't prevent cancer. And it may not find all lung cancers. Lung cancer screening may lower the risk of dying from lung cancer in a small number of people.  How is it done?  Lung cancer screening is done with a low-dose CT (computed tomography) scan. A CT scan uses X-rays, or radiation, to make detailed pictures of your body. Experts recommend that screening be done in medical centers that focus on finding and treating lung cancer.  Who is screening recommended for?  Lung cancer screening is recommended for people age 50 and older who are or were heavy

## 2024-06-10 NOTE — PROGRESS NOTES
[facility]  [facility] MEDICINE  [address]  [facility] [address]  Dept: [phone]  Dept Fax: [phone]  Loc: [phone]      Medicare Annual Wellness Visit    Susie Selby is here for Medicare AWV (No major concerns. Is going to get a head CT scan tomorrow for Dr. Schulz ) and Discuss Medications (Has not been taking her cholesterol medication she can't sleep when she is on it but still takes it once in awhile. BP medication has been making her feel \"funny\" )    Assessment & Plan   Medicare annual wellness visit, subsequent  Mixed hyperlipidemia  -     Lipid Panel; Future  -     Comprehensive Metabolic Panel; Future  Primary hypertension  -     Comprehensive Metabolic Panel; Future  -     CBC with Auto Differential; Future  Anxiety  Pulmonary emphysema, unspecified emphysema type (HCC)  Encounter for screening mammogram for malignant neoplasm of breast  -     [facility] DIGITAL SCREEN BILATERAL; Future  Bronchitis  -     azithromycin (ZITHROMAX) 250 MG tablet; 500mg on day 1 followed by 250mg on days 2 - 5, Disp-6 tablet, R-0Normal  -     PROAIR  (90 Base) MCG/ACT inhaler; Inhale 2 puffs into the lungs every 4 hours as needed for Wheezing, Disp-1 each, R-2, DAWNormal  Tobacco abuse  -     PROAIR  (90 Base) MCG/ACT inhaler; Inhale 2 puffs into the lungs every 4 hours as needed for Wheezing, Disp-1 each, R-2, DAWNormal  Personal history of tobacco use  -     [facility] VISIT TO DISCUSS LUNG CA SCREEN W LDCT  -     CT Lung Screen (Initial/Annual/Baseline); Future     - Colonoscopy April 2024- 13 polyps removed.  Repeat again in 2025.    - Rest and increase fluids.  Start z-pack for bronchitis.  Refill inhaler.   - Tylenol as needed for pain and fever  - Mammogram overdue, new order placed  - Pt stopped Crestor about a month ago due to mucle aches.  Repeat FLP ordered.  Discussed ASCVD risk and pt would still like to hold on this for now. Follow up with

## 2024-06-12 ENCOUNTER — OFFICE VISIT (OUTPATIENT)
Dept: CARDIOLOGY CLINIC | Age: 74
End: 2024-06-12
Payer: MEDICARE

## 2024-06-12 ENCOUNTER — TELEPHONE (OUTPATIENT)
Dept: CARDIOLOGY CLINIC | Age: 74
End: 2024-06-12

## 2024-06-12 VITALS
HEART RATE: 106 BPM | SYSTOLIC BLOOD PRESSURE: 121 MMHG | BODY MASS INDEX: 23.43 KG/M2 | DIASTOLIC BLOOD PRESSURE: 63 MMHG | HEIGHT: 65 IN | WEIGHT: 140.6 LBS

## 2024-06-12 DIAGNOSIS — R00.2 PALPITATIONS: Primary | ICD-10-CM

## 2024-06-12 DIAGNOSIS — I10 ESSENTIAL HYPERTENSION: ICD-10-CM

## 2024-06-12 PROCEDURE — 1123F ACP DISCUSS/DSCN MKR DOCD: CPT | Performed by: STUDENT IN AN ORGANIZED HEALTH CARE EDUCATION/TRAINING PROGRAM

## 2024-06-12 PROCEDURE — 99214 OFFICE O/P EST MOD 30 MIN: CPT | Performed by: STUDENT IN AN ORGANIZED HEALTH CARE EDUCATION/TRAINING PROGRAM

## 2024-06-12 RX ORDER — LISINOPRIL AND HYDROCHLOROTHIAZIDE 12.5; 1 MG/1; MG/1
1 TABLET ORAL DAILY
Qty: 30 TABLET | Refills: 1 | Status: SHIPPED | OUTPATIENT
Start: 2024-06-12

## 2024-06-12 NOTE — TELEPHONE ENCOUNTER
Noted. Occ pvcs. Her symptoms are not associated any arrhthymia however. No changes to meds as a result of monitor.

## 2024-06-12 NOTE — TELEPHONE ENCOUNTER
Pt was seen today   [facility] monitor scanned in media     Please advise if anything is needed

## 2024-06-12 NOTE — PATIENT INSTRUCTIONS
Take lisinopril 10 mg hctz 12.5 instead of 20 mg for 2-3 weeks.     Keep track of blood pressure, and let us now average readings over the next 2-3 weeks.

## 2024-06-27 ENCOUNTER — HOSPITAL ENCOUNTER (OUTPATIENT)
Dept: WOMENS IMAGING | Age: 74
Discharge: HOME | End: 2024-06-27
Payer: MEDICARE

## 2024-06-27 ENCOUNTER — HOSPITAL ENCOUNTER (OUTPATIENT)
Dept: CT IMAGING | Age: 74
Discharge: HOME | End: 2024-06-27
Payer: MEDICARE

## 2024-06-27 VITALS — HEIGHT: 65 IN | BODY MASS INDEX: 23.32 KG/M2 | WEIGHT: 140 LBS

## 2024-06-27 DIAGNOSIS — Z12.31 ENCOUNTER FOR SCREENING MAMMOGRAM FOR MALIGNANT NEOPLASM OF BREAST: ICD-10-CM

## 2024-06-27 DIAGNOSIS — Z87.891 PERSONAL HISTORY OF TOBACCO USE: ICD-10-CM

## 2024-06-27 PROCEDURE — 77063 BREAST TOMOSYNTHESIS BI: CPT

## 2024-06-27 PROCEDURE — 71271 CT THORAX LUNG CANCER SCR C-: CPT

## 2024-06-28 ENCOUNTER — TELEPHONE (OUTPATIENT)
Dept: FAMILY MEDICINE CLINIC | Age: 74
End: 2024-06-28

## 2024-06-28 DIAGNOSIS — R05.1 ACUTE COUGH: ICD-10-CM

## 2024-06-28 RX ORDER — DEXTROMETHORPHAN HYDROBROMIDE AND PROMETHAZINE HYDROCHLORIDE 15; 6.25 MG/5ML; MG/5ML
5 SYRUP ORAL 4 TIMES DAILY PRN
Qty: 118 ML | Refills: 0 | Status: SHIPPED | OUTPATIENT
Start: 2024-06-28 | End: 2024-07-05

## 2024-06-28 NOTE — TELEPHONE ENCOUNTER
Patient c/o chronic cough that you have treated in the past with promethazine DM.  She is wondering if you would be willing to prescribe again.  Denies any other symptoms except for the cough.  Using [facility] on [address]   Order pended.  P

## 2024-06-28 NOTE — TELEPHONE ENCOUNTER
Noted.  OK for refill. Follow up in office if symptoms are not improving. -WS    Orders Placed This Encounter   Medications    promethazine-dextromethorphan (PROMETHAZINE-DM) 6.25-15 MG/5ML syrup     Sig: Take 5 mLs by mouth 4 times daily as needed for Cough     Dispense:  118 mL     Refill:  0

## 2024-08-12 RX ORDER — LISINOPRIL AND HYDROCHLOROTHIAZIDE 12.5; 1 MG/1; MG/1
1 TABLET ORAL DAILY
Qty: 90 TABLET | Refills: 0 | Status: SHIPPED | OUTPATIENT
Start: 2024-08-12

## 2024-08-12 NOTE — TELEPHONE ENCOUNTER
Susie Selby called requesting a refill on the following medications:  Requested Prescriptions     Pending Prescriptions Disp Refills    lisinopril-hydroCHLOROthiazide (PRINZIDE;ZESTORETIC) 10-12.5 MG per tablet 30 tablet 1     Sig: Take 1 tablet by mouth daily     Pharmacy verified:  [email or web address]    [facility]/pharmacy #[id number] - [place], [place] - [address] -  [phone] -  [phone]       Date of last visit: 06/12/2024  Date of next visit (if applicable): 9/12/2024    Patient is requesting a 3 month supply sent to [facility] in [place]. If only able to get a 1 month supply, she said it will need to go to [facility] on [address]. Patient said she is out of this medication and wants to know if this will be sent today and if not, what she is supposed to do in the mean time?

## 2024-09-12 ENCOUNTER — OFFICE VISIT (OUTPATIENT)
Dept: CARDIOLOGY CLINIC | Age: 74
End: 2024-09-12
Payer: MEDICARE

## 2024-09-12 VITALS
BODY MASS INDEX: 23.46 KG/M2 | SYSTOLIC BLOOD PRESSURE: 91 MMHG | DIASTOLIC BLOOD PRESSURE: 62 MMHG | HEART RATE: 102 BPM | WEIGHT: 141 LBS

## 2024-09-12 DIAGNOSIS — R00.2 PALPITATIONS: ICD-10-CM

## 2024-09-12 DIAGNOSIS — I10 ESSENTIAL HYPERTENSION: Primary | ICD-10-CM

## 2024-09-12 PROCEDURE — 1123F ACP DISCUSS/DSCN MKR DOCD: CPT | Performed by: STUDENT IN AN ORGANIZED HEALTH CARE EDUCATION/TRAINING PROGRAM

## 2024-09-12 PROCEDURE — 93000 ELECTROCARDIOGRAM COMPLETE: CPT | Performed by: STUDENT IN AN ORGANIZED HEALTH CARE EDUCATION/TRAINING PROGRAM

## 2024-09-12 PROCEDURE — 99214 OFFICE O/P EST MOD 30 MIN: CPT | Performed by: STUDENT IN AN ORGANIZED HEALTH CARE EDUCATION/TRAINING PROGRAM

## 2024-09-12 PROCEDURE — 3078F DIAST BP <80 MM HG: CPT | Performed by: STUDENT IN AN ORGANIZED HEALTH CARE EDUCATION/TRAINING PROGRAM

## 2024-09-12 PROCEDURE — 3074F SYST BP LT 130 MM HG: CPT | Performed by: STUDENT IN AN ORGANIZED HEALTH CARE EDUCATION/TRAINING PROGRAM

## 2024-09-12 RX ORDER — METOPROLOL TARTRATE 25 MG/1
25 TABLET, FILM COATED ORAL 2 TIMES DAILY
COMMUNITY
End: 2024-09-12 | Stop reason: SDUPTHER

## 2024-09-12 RX ORDER — METOPROLOL TARTRATE 25 MG/1
25 TABLET, FILM COATED ORAL 2 TIMES DAILY
Qty: 60 TABLET | Refills: 1 | Status: SHIPPED | OUTPATIENT
Start: 2024-09-12

## 2024-10-07 RX ORDER — METOPROLOL TARTRATE 25 MG/1
25 TABLET, FILM COATED ORAL 2 TIMES DAILY
Qty: 180 TABLET | Refills: 1 | Status: SHIPPED | OUTPATIENT
Start: 2024-10-07

## 2024-10-22 ENCOUNTER — OFFICE VISIT (OUTPATIENT)
Dept: CARDIOLOGY CLINIC | Age: 74
End: 2024-10-22
Payer: MEDICARE

## 2024-10-22 ENCOUNTER — TELEPHONE (OUTPATIENT)
Dept: CARDIOLOGY CLINIC | Age: 74
End: 2024-10-22

## 2024-10-22 VITALS — HEART RATE: 63 BPM | DIASTOLIC BLOOD PRESSURE: 68 MMHG | SYSTOLIC BLOOD PRESSURE: 126 MMHG

## 2024-10-22 DIAGNOSIS — I10 ESSENTIAL HYPERTENSION: ICD-10-CM

## 2024-10-22 DIAGNOSIS — R00.2 PALPITATIONS: Primary | ICD-10-CM

## 2024-10-22 PROCEDURE — 3074F SYST BP LT 130 MM HG: CPT | Performed by: NUCLEAR MEDICINE

## 2024-10-22 PROCEDURE — 99212 OFFICE O/P EST SF 10 MIN: CPT | Performed by: NUCLEAR MEDICINE

## 2024-10-22 PROCEDURE — 3078F DIAST BP <80 MM HG: CPT | Performed by: NUCLEAR MEDICINE

## 2024-10-22 PROCEDURE — 93000 ELECTROCARDIOGRAM COMPLETE: CPT | Performed by: NUCLEAR MEDICINE

## 2024-10-22 PROCEDURE — 1123F ACP DISCUSS/DSCN MKR DOCD: CPT | Performed by: NUCLEAR MEDICINE

## 2024-10-22 NOTE — TELEPHONE ENCOUNTER
Patient presented to office today for BP check and EKG after starting Lopressor 25mg BID and has been on it for 2 weeks.     Elijah out of office this week  His note reports Dante is cardiologist\    Please see EKG tracing from today.   Reports palpitations has lessened.

## 2024-12-02 ENCOUNTER — TELEPHONE (OUTPATIENT)
Dept: FAMILY MEDICINE CLINIC | Age: 74
End: 2024-12-02

## 2024-12-02 RX ORDER — ONDANSETRON 4 MG/1
4 TABLET, FILM COATED ORAL 3 TIMES DAILY PRN
Qty: 15 TABLET | Refills: 0 | Status: SHIPPED | OUTPATIENT
Start: 2024-12-02

## 2024-12-02 NOTE — TELEPHONE ENCOUNTER
C/O vomiting that started this morning. Requesting to have something prescribed to help control the vomiting. Uses [facility]. Please advise.

## 2024-12-02 NOTE — TELEPHONE ENCOUNTER
Noted.  RX for zofran sent to pharmacy.  Follow up in office if not improving, ER if getting worse.- WS

## 2025-02-04 ENCOUNTER — TELEPHONE (OUTPATIENT)
Dept: ADMINISTRATIVE | Age: 75
End: 2025-02-04

## 2025-02-04 NOTE — TELEPHONE ENCOUNTER
Patient  /77 & pulse 70's she would like to speak with nurse and states her top number of blood pressure is running high. She states she has been taking her medication like she should.

## 2025-02-05 ENCOUNTER — TELEPHONE (OUTPATIENT)
Dept: CARDIOLOGY CLINIC | Age: 75
End: 2025-02-05

## 2025-02-05 NOTE — TELEPHONE ENCOUNTER
Pt LM on nurse line asking about her appointment tomorrow - if needing anything special.  LM for pt to return call.

## 2025-02-05 NOTE — PROGRESS NOTES
[facility] PHYSICIANS [place] SPECIALTY  [facility] CARDIOLOGY  [address]  [address]  [facility] [address]  Dept: [phone]  Dept Fax: [phone]  Loc: [phone]    Visit Date: 2/6/2025    Susie Selby is a 74 y.o. female who presents todayfor:  Chief Complaint   Patient presents with    Follow-up     Discuss BP     Cardiologist: loni Gomez of palpitations, HTN    HPI: 3 month f/u for palpitations   Palpitations when BP is up more. Feels buzzing in her ear when BP is up. No chest pain or sob. No dizzy or lightheaded symptoms. Some stress all the time but nothing new lately. Taking metoprolol 25 mg BID.     Past Surgical History:   Procedure Laterality Date    CHOLECYSTECTOMY  1983    COLONOSCOPY      [facility]      HYSTERECTOMY (CERVIX STATUS UNKNOWN)  1981    Right ovary intact    OVARIAN CYST REMOVAL Right 1985    POLYPECTOMY  08/26/2016    ROTATOR CUFF REPAIR Left 05/19/2017    Dr. Freed    TONSILLECTOMY AND ADENOIDECTOMY  Age 9    UPPER GASTROINTESTINAL ENDOSCOPY      VOCAL CORD SURGERY  04/23/2021    [facility]     Family History   Problem Relation Age of Onset    Diabetes Mother         Type 2    Kidney Disease Mother     High Cholesterol Mother     High Blood Pressure Mother     Heart Disease Mother     High Blood Pressure Father     Liver Cancer Father     Liver Cancer Brother     Heart Attack Maternal Grandmother     Heart Disease Maternal Grandmother     High Blood Pressure Maternal Grandmother     Parkinsonism Maternal Grandfather     Heart Attack Paternal Grandmother     High Blood Pressure Sister     High Cholesterol Sister     Diabetes Brother     Coronary Art Dis Brother     Stroke Brother     No Known Problems Brother     No Known Problems Brother     No Known Problems Sister      Social History     Tobacco Use    Smoking status: Former     Current packs/day: 0.00     Average packs/day: 0.8 packs/day for 49.8 years (37.4 ttl pk-yrs)     Types: Cigarettes     Start date: 1968

## 2025-02-06 ENCOUNTER — OFFICE VISIT (OUTPATIENT)
Dept: CARDIOLOGY CLINIC | Age: 75
End: 2025-02-06
Payer: MEDICARE

## 2025-02-06 VITALS
HEART RATE: 77 BPM | BODY MASS INDEX: 24.16 KG/M2 | HEIGHT: 65 IN | DIASTOLIC BLOOD PRESSURE: 78 MMHG | WEIGHT: 145 LBS | SYSTOLIC BLOOD PRESSURE: 124 MMHG

## 2025-02-06 DIAGNOSIS — I10 ESSENTIAL HYPERTENSION: ICD-10-CM

## 2025-02-06 DIAGNOSIS — R00.2 PALPITATIONS: Primary | ICD-10-CM

## 2025-02-06 PROCEDURE — 3074F SYST BP LT 130 MM HG: CPT | Performed by: STUDENT IN AN ORGANIZED HEALTH CARE EDUCATION/TRAINING PROGRAM

## 2025-02-06 PROCEDURE — 3078F DIAST BP <80 MM HG: CPT | Performed by: STUDENT IN AN ORGANIZED HEALTH CARE EDUCATION/TRAINING PROGRAM

## 2025-02-06 PROCEDURE — 93000 ELECTROCARDIOGRAM COMPLETE: CPT | Performed by: STUDENT IN AN ORGANIZED HEALTH CARE EDUCATION/TRAINING PROGRAM

## 2025-02-06 PROCEDURE — 1159F MED LIST DOCD IN RCRD: CPT | Performed by: STUDENT IN AN ORGANIZED HEALTH CARE EDUCATION/TRAINING PROGRAM

## 2025-02-06 PROCEDURE — 99214 OFFICE O/P EST MOD 30 MIN: CPT | Performed by: STUDENT IN AN ORGANIZED HEALTH CARE EDUCATION/TRAINING PROGRAM

## 2025-02-06 PROCEDURE — 1123F ACP DISCUSS/DSCN MKR DOCD: CPT | Performed by: STUDENT IN AN ORGANIZED HEALTH CARE EDUCATION/TRAINING PROGRAM

## 2025-02-06 RX ORDER — LISINOPRIL 5 MG/1
5 TABLET ORAL PRN
Qty: 30 TABLET | Refills: 3 | Status: SHIPPED | OUTPATIENT
Start: 2025-02-06

## 2025-02-06 NOTE — PATIENT INSTRUCTIONS
After taking metoprolol, if BP remains above 150/90, take lisinopril dose 5 mg.     You may receive a survey regarding the care you received during your visit. We encourage you to complete and return your survey, as your input is valuable to us. We hope you will choose us in the future for your healthcare needs. Thank you!    Your Medical Assistant today: Neri Mckenzie  Thank you for coming to our office! It was a pleasure to serve you.     Happy [employer] Month!

## 2025-03-31 RX ORDER — METOPROLOL TARTRATE 25 MG/1
25 TABLET, FILM COATED ORAL 2 TIMES DAILY
Qty: 180 TABLET | Refills: 1 | Status: SHIPPED | OUTPATIENT
Start: 2025-03-31